# Patient Record
Sex: FEMALE | Race: WHITE | NOT HISPANIC OR LATINO | Employment: OTHER | URBAN - METROPOLITAN AREA
[De-identification: names, ages, dates, MRNs, and addresses within clinical notes are randomized per-mention and may not be internally consistent; named-entity substitution may affect disease eponyms.]

---

## 2021-08-09 ENCOUNTER — TRANSFERRED RECORDS (OUTPATIENT)
Dept: HEALTH INFORMATION MANAGEMENT | Facility: CLINIC | Age: 71
End: 2021-08-09

## 2022-01-06 ENCOUNTER — TRANSCRIBE ORDERS (OUTPATIENT)
Dept: OTHER | Age: 72
End: 2022-01-06
Payer: COMMERCIAL

## 2022-01-06 DIAGNOSIS — D47.3 ESSENTIAL THROMBOCYTHEMIA (H): Primary | ICD-10-CM

## 2022-01-07 ENCOUNTER — PATIENT OUTREACH (OUTPATIENT)
Dept: ONCOLOGY | Facility: CLINIC | Age: 72
End: 2022-01-07
Payer: COMMERCIAL

## 2022-01-07 NOTE — PROGRESS NOTES
New pt scheduling transcribed self-referral:    Referral called in by patient for thrombocythemia. Previously seen at [facility].  patient niurka Dr. Lambert due to be seen in Feb, needs refill for medication.        January 7, 2022 OUTGOING CALL to pt, no answer. LVM introducing my role as nurse navigator with [facility] and that we have recd the self-referral for essential thrombocythemia.  Introduced my role as nurse navigator with [facility] and that we have recd the referral, but no records yet.  Explained to pt that she will receive a call from our scheduling intake team and the records team will work with her to obtain the records.     January 13, 2022 INCOMING CALL: Kelsea called to speak with NN inquiring about hydroxyurea rx, labs.  Future Appointments   Date Time Provider Department Center   2/7/2022 10:00 AM Sanjay Lambert MD [facility]     Message to Dr Lambert to advise re: pre-visit lab orders, and I will call pt back with recs. 1/17 12:30 video visit offered, on hold for now. Pt voiced understanding of above instructions and information and denied further questions  Message to records team re: need for outside records from [facility], pt will sign TESFAYE today.    [id number] [employer] [facility] [place] , [place] - [facility] [address]  Hydroxyurea dose is 1-2 caps by mouth per day as directed   Current dose:  5 days a week 1 500 mg, 2 other days 1000 mg.    Dionna Stanley, RN, BSN, OCN  Hematology/Oncology Nurse Navigator  [facility]  [phone]

## 2022-01-13 DIAGNOSIS — D47.3 ESSENTIAL THROMBOCYTHEMIA (H): Primary | ICD-10-CM

## 2022-01-13 RX ORDER — HYDROXYUREA 500 MG/1
500 CAPSULE ORAL DAILY
Qty: 75 CAPSULE | Refills: 6 | Status: SHIPPED | OUTPATIENT
Start: 2022-01-13 | End: 2023-01-19

## 2022-01-13 NOTE — TELEPHONE ENCOUNTER
RECORDS STATUS - ALL OTHER DIAGNOSIS      Action    Action Taken 1/13/22  Spoke w/ pt - pt advised they were currently filling out TESFAYE. Provided pt w/ address phone and fax. Pt will fill out and email to me shortly.  3:14 PM    1/14/22  TESFAYE received, records requested.  6:58 AM    January 14, 2022 10:06 AM ABT  Records from [facility] received and sent to HIM for upload.       RECORDS RECEIVED FROM: [facility]   DATE RECEIVED: 01/14/22   NOTES STATUS DETAILS   OFFICE NOTE from referring provider Self xfer of Dr. Lambert   OFFICE NOTE from medical oncologist External:[facility] 08/09/21: Dr. Lambert   DISCHARGE SUMMARY from hospital     DISCHARGE REPORT from the ER     OPERATIVE REPORT     MEDICATION LIST External:[facility]    CLINICAL TRIAL TREATMENTS TO DATE     LABS     PATHOLOGY REPORTS     ANYTHING RELATED TO DIAGNOSIS External:[facility] Most recent labs 08/09/21   GENONOMIC TESTING     TYPE:     IMAGING (NEED IMAGES & REPORT)     CT SCANS     MRI     MAMMO     ULTRASOUND     PET

## 2022-01-14 NOTE — PROGRESS NOTES
January 14, 2022 notified pt by phone that Dr Lambert placed orders for CBC w/diff as well as Hydrea rx and concurs plan for  video visit on 1/17 to establish care with him , with lab draw prior to review results at visit. Pt voiced understanding of above instructions and information and denied further questions. She prefers to self-schedule the lab appt via [employer] lab only visit feature at her [facility].

## 2022-01-16 NOTE — PROGRESS NOTES
Kelsea is a 71 year old who is being evaluated via a billable video visit.      How would you like to obtain your AVS? MyChart  If the video visit is dropped, the invitation should be resent by: Text to cell phone: [phone]  Will anyone else be joining your video visit? Karen Laws    Video Start Time: 12:40  Video-Visit Details    Type of service:  Video Visit    Video End Time:1:03 PM    Originating Location (pt. Location): Home    Distant Location (provider location):  [facility]     Platform used for Video Visit: [facility] Medical Oncology Note       Date of visit: January 17, 2022  New Outpatient Clinic Note        Assessment:     1. Essential thrombocythemia, with stable platelet count on current dosing of hydroxyurea, 1000 mg p.o. Saturdays and Sundays, with 500 mg p.o. daily the rest of the week.  This has been stable for 20 years and the chance of this becoming a major issue for her is extraordinarily low.  There is a one in 20 chance of progressing to either myelofibrosis or an acute leukemia.  This is why it continued to need to see Kelsea and its been a pleasure to do so.  2. As has always been my concern, the major healthcare issue is that I think she is got COPD.  She smoked for many years, but has quit now for the last three or four.  Nonetheless I think she would do better by seeing a primary care physician who could have her on some inhaled steroids.  3. Multiple other psychosocial stressors discussed at length        Plan:     1.           Sanjay Lambert MD, MSc  Associate Professor of Medicine  [employer]  [facility]  [address] [place] [id number]  [phone]    __________________________________________________________________    CHIEF COMPLAINT     1. Essential thrombocythemia, diagnosed by bone marrow biopsy on 04/10/2001.  Kelsea has low risk features but has a significantly increased risk  of thrombosis because of her long-term history of smoking.  She has quit for the last 4-5 years.  2. I have known pain for 20 years and she is always refused to see a primary care provider even though it is my assumption that she has COPD based on exam findings.  She may also have hypertension and hyperlipidemia, things I do not manage.  She is always refused seeing somebody else.  What can I do?    History of Present Illness:       Hydroxyurea at varying doses.  She has been on this for 20 years.  Her current dose is 1000 mg p.o. daily on Saturdays and Sundays, with 500 mg daily the rest of the week    She also remains on a daily aspirin.      Interval history:       Kelsea is back for her virtual visit    She has gone through a very stressful time.  Her brother, who lives in [place], was recently found to have widely metastatic lung cancer.  He was found down in his place.  He had metastases throughout most of his body.  He was then brought home and all of his family was with him where he passed away peacefully.  It was very tough on Kelsea because she was quite close with him.  She also had to bring her mother from [place] to [place] with her.  All of the family looks back on this experience as being stressful, but they are all very satisfied with how everything went.    She and Jason have been pretty much isolating themselves.  This is more of an issue for day and then for Kelsea.    She was about to see a primary care physician and then her brother's illness struck.  She still has no primary care provider.  She understands that seeing one would be helpful since she does get short of breath with a lot of activity.  She understands that this is related to her probable diagnosis of COPD.    She has no new lumps or bumps.    Her appetite is good.    She has not been ill.  She is triple vaccinated.    She has no bleeding or bruising.    She is still tolerating the hydroxyurea and the aspirin.    Multiple other  psychosocial stressors were discussed at length today.      Past Medical History:   I have reviewed this patient's past medical history   No past medical history on file.       Past Surgical History:    I have reviewed this patient's past surgical history       Social History:   Tobacco, ETOH, and rec drugs reviewed and as noted below with the following exceptions:  Kelsea is  but has no biologic children.  She is originally from [place].  She has been traveling back and forth to [place] because her 93-year-old mother, who most recently lived with her and her  Jason, wanted to see family.  Kelsea's brothers think of her as a Saint.  She would like them to share some of the burden of their mother's care.            Family History:     No family history on file.         Medications:     Current Outpatient Medications   Medication Sig Dispense Refill     Alendronate Sodium (FOSAMAX PO) Take  by mouth.       aspirin 81 MG chewable tablet Take 81 mg by mouth daily.       azithromycin (ZITHROMAX) 250 MG tablet Two tablets first day, then one tablet daily for four days   6 tablet 0     benzonatate (TESSALON) 200 MG capsule Take 1 capsule by mouth 3 times daily as needed for cough. 21 capsule 0     Calcium Carbonate-Vitamin D (CALCIUM + D PO) Take  by mouth.       Cholecalciferol (VITAMIN D PO) Take  by mouth.       guaiFENesin-codeine (ROBITUSSIN AC) 100-10 MG/5ML SOLN Take 5 mLs by mouth At Bedtime. 120 mL 0     hydrocodone-acetaminophen 5-325 MG per tablet Take 1-2 tablets by mouth every 6 hours as needed for pain. 12 tablet 0     hydroxyurea (HYDREA) 500 MG capsule Take 1 capsule (500 mg) by mouth daily 5 days/week with 1000 mg the other 2 days 75 capsule 6     hydroxyurea (HYDREA) 500 MG capsule Take 1 capsule by mouth daily.       ORDER FOR DME Equipment being ordered: Oxygen 1 each 0     raloxifene (EVISTA) 60 MG tablet Take 60 mg by mouth daily.                Physical Exam:   There were no vitals taken for  this visit.    ECOG PS: 0  Constitutional: WDWN female in NAD, pleasant and appropriate  Neurologic: alert, answering questions appropriately, moving all extremities spontaneously. CN 2-12 grossly intact.  Psych: appropriate affect  No further exam could be done because this was a virtual visit  Data:   No results for input(s): WBC, NEUTROPHIL, HGB, PLT in the last 39672 hours.  No results for input(s): NA, POTASSIUM, CHLORIDE, CO2, ANIONGAP, BUN, CR, KAYLAN in the last 27390 hours.  No results for input(s): MAG, PHOS, LDH, URIC in the last 73300 hours.  No results for input(s): BILITOTAL, ALKPHOS, ALT, AST, ALBUMIN, LDH in the last 85877 hours.    No results found for this or any previous visit (from the past 24 hour(s)).    Other Data     Her CBC was reviewed.  She has a white blood cell count of 3.7 with an ANC of 2.1.  There are no blasts on the differential.  Hemoglobin is 13.3 with an MCV of 117.  Platelet count is 421,000      Labs, imaging and treatment plan reviewed with patient. All questions answered.        40 minutes spent on the date of the encounter doing chart review, review of outside records, review of test results, interpretation of tests, patient visit and documentation

## 2022-01-17 ENCOUNTER — VIRTUAL VISIT (OUTPATIENT)
Dept: ONCOLOGY | Facility: CLINIC | Age: 72
End: 2022-01-17
Attending: INTERNAL MEDICINE
Payer: COMMERCIAL

## 2022-01-17 ENCOUNTER — LAB (OUTPATIENT)
Dept: LAB | Facility: CLINIC | Age: 72
End: 2022-01-17
Payer: COMMERCIAL

## 2022-01-17 DIAGNOSIS — D47.3 ESSENTIAL THROMBOCYTHEMIA (H): ICD-10-CM

## 2022-01-17 LAB
BASOPHILS # BLD AUTO: 0 10E3/UL (ref 0–0.2)
BASOPHILS NFR BLD AUTO: 0 %
EOSINOPHIL # BLD AUTO: 0.1 10E3/UL (ref 0–0.7)
EOSINOPHIL NFR BLD AUTO: 2 %
ERYTHROCYTE [DISTWIDTH] IN BLOOD BY AUTOMATED COUNT: 12.8 % (ref 10–15)
HCT VFR BLD AUTO: 40.5 % (ref 35–47)
HGB BLD-MCNC: 13.3 G/DL (ref 11.7–15.7)
LYMPHOCYTES # BLD AUTO: 1 10E3/UL (ref 0.8–5.3)
LYMPHOCYTES NFR BLD AUTO: 28 %
MCH RBC QN AUTO: 38.6 PG (ref 26.5–33)
MCHC RBC AUTO-ENTMCNC: 32.8 G/DL (ref 31.5–36.5)
MCV RBC AUTO: 117 FL (ref 78–100)
MONOCYTES # BLD AUTO: 0.5 10E3/UL (ref 0–1.3)
MONOCYTES NFR BLD AUTO: 12 %
NEUTROPHILS # BLD AUTO: 2.1 10E3/UL (ref 1.6–8.3)
NEUTROPHILS NFR BLD AUTO: 57 %
PLATELET # BLD AUTO: 421 10E3/UL (ref 150–450)
RBC # BLD AUTO: 3.45 10E6/UL (ref 3.8–5.2)
WBC # BLD AUTO: 3.7 10E3/UL (ref 4–11)

## 2022-01-17 PROCEDURE — 99215 OFFICE O/P EST HI 40 MIN: CPT | Mod: 95 | Performed by: INTERNAL MEDICINE

## 2022-01-17 PROCEDURE — 36415 COLL VENOUS BLD VENIPUNCTURE: CPT

## 2022-01-17 PROCEDURE — 85025 COMPLETE CBC W/AUTO DIFF WBC: CPT

## 2022-01-17 NOTE — LETTER
1/17/2022         RE: Sienna Bennett  [address]  [place] [address]        Dear Colleague,    Thank you for referring your patient, Sienna Bennett, to the [facility]. Please see a copy of my visit note below.    Kelsea is a 71 year old who is being evaluated via a billable video visit.      How would you like to obtain your AVS? MyChart  If the video visit is dropped, the invitation should be resent by: Text to cell phone: [phone]  Will anyone else be joining your video visit? Karen Mckeonxu Laws    Video Start Time: 12:40  Video-Visit Details    Type of service:  Video Visit    Video End Time:1:03 PM    Originating Location (pt. Location): Home    Distant Location (provider location):  [facility]     Platform used for Video Visit: [facility] Medical Oncology Note       Date of visit: January 17, 2022  New Outpatient Clinic Note        Assessment:     1. Essential thrombocythemia, with stable platelet count on current dosing of hydroxyurea, 1000 mg p.o. Saturdays and Sundays, with 500 mg p.o. daily the rest of the week.  This has been stable for 20 years and the chance of this becoming a major issue for her is extraordinarily low.  There is a one in 20 chance of progressing to either myelofibrosis or an acute leukemia.  This is why it continued to need to see Kelsea and its been a pleasure to do so.  2. As has always been my concern, the major healthcare issue is that I think she is got COPD.  She smoked for many years, but has quit now for the last three or four.  Nonetheless I think she would do better by seeing a primary care physician who could have her on some inhaled steroids.  3. Multiple other psychosocial stressors discussed at length        Plan:     1.           Sanjay Lambert MD, MSc  Associate Professor of Medicine  [employer]  [facility]  [address] [place] [address]  [place] [address]  [phone]    __________________________________________________________________    CHIEF COMPLAINT     1. Essential thrombocythemia, diagnosed by bone marrow biopsy on 04/10/2001.  Kelsea has low risk features but has a significantly increased risk of thrombosis because of her long-term history of smoking.  She has quit for the last 4-5 years.  2. I have known pain for 20 years and she is always refused to see a primary care provider even though it is my assumption that she has COPD based on exam findings.  She may also have hypertension and hyperlipidemia, things I do not manage.  She is always refused seeing somebody else.  What can I do?    History of Present Illness:       Hydroxyurea at varying doses.  She has been on this for 20 years.  Her current dose is 1000 mg p.o. daily on Saturdays and Sundays, with 500 mg daily the rest of the week    She also remains on a daily aspirin.      Interval history:       Kelsea is back for her virtual visit    She has gone through a very stressful time.  Her brother, who lives in [place], was recently found to have widely metastatic lung cancer.  He was found down in his place.  He had metastases throughout most of his body.  He was then brought home and all of his family was with him where he passed away peacefully.  It was very tough on Kelsea because she was quite close with him.  She also had to bring her mother from [place] to [place] with her.  All of the family looks back on this experience as being stressful, but they are all very satisfied with how everything went.    She and Jason have been pretty much isolating themselves.  This is more of an issue for day and then for Kelsea.    She was about to see a primary care physician and then her brother's illness struck.  She still has no primary care provider.  She understands that seeing one would be helpful since she does get short of breath with a lot of activity.  She understands that this is  related to her probable diagnosis of COPD.    She has no new lumps or bumps.    Her appetite is good.    She has not been ill.  She is triple vaccinated.    She has no bleeding or bruising.    She is still tolerating the hydroxyurea and the aspirin.    Multiple other psychosocial stressors were discussed at length today.      Past Medical History:   I have reviewed this patient's past medical history   No past medical history on file.       Past Surgical History:    I have reviewed this patient's past surgical history       Social History:   Tobacco, ETOH, and rec drugs reviewed and as noted below with the following exceptions:  Kelsea is  but has no biologic children.  She is originally from [place].  She has been traveling back and forth to [place] because her 93-year-old mother, who most recently lived with her and her  Jason, wanted to see family.  Kelsea's brothers think of her as a Saint.  She would like them to share some of the burden of their mother's care.            Family History:     No family history on file.         Medications:     Current Outpatient Medications   Medication Sig Dispense Refill     Alendronate Sodium (FOSAMAX PO) Take  by mouth.       aspirin 81 MG chewable tablet Take 81 mg by mouth daily.       azithromycin (ZITHROMAX) 250 MG tablet Two tablets first day, then one tablet daily for four days   6 tablet 0     benzonatate (TESSALON) 200 MG capsule Take 1 capsule by mouth 3 times daily as needed for cough. 21 capsule 0     Calcium Carbonate-Vitamin D (CALCIUM + D PO) Take  by mouth.       Cholecalciferol (VITAMIN D PO) Take  by mouth.       guaiFENesin-codeine (ROBITUSSIN AC) 100-10 MG/5ML SOLN Take 5 mLs by mouth At Bedtime. 120 mL 0     hydrocodone-acetaminophen 5-325 MG per tablet Take 1-2 tablets by mouth every 6 hours as needed for pain. 12 tablet 0     hydroxyurea (HYDREA) 500 MG capsule Take 1 capsule (500 mg) by mouth daily 5 days/week with 1000 mg the other 2 days 75  capsule 6     hydroxyurea (HYDREA) 500 MG capsule Take 1 capsule by mouth daily.       ORDER FOR DME Equipment being ordered: Oxygen 1 each 0     raloxifene (EVISTA) 60 MG tablet Take 60 mg by mouth daily.                Physical Exam:   There were no vitals taken for this visit.    ECOG PS: 0  Constitutional: WDWN female in NAD, pleasant and appropriate  Neurologic: alert, answering questions appropriately, moving all extremities spontaneously. CN 2-12 grossly intact.  Psych: appropriate affect  No further exam could be done because this was a virtual visit  Data:   No results for input(s): WBC, NEUTROPHIL, HGB, PLT in the last 45878 hours.  No results for input(s): NA, POTASSIUM, CHLORIDE, CO2, ANIONGAP, BUN, CR, KAYLAN in the last 77103 hours.  No results for input(s): MAG, PHOS, LDH, URIC in the last 45393 hours.  No results for input(s): BILITOTAL, ALKPHOS, ALT, AST, ALBUMIN, LDH in the last 46947 hours.    No results found for this or any previous visit (from the past 24 hour(s)).    Other Data     Her CBC was reviewed.  She has a white blood cell count of 3.7 with an ANC of 2.1.  There are no blasts on the differential.  Hemoglobin is 13.3 with an MCV of 117.  Platelet count is 421,000      Labs, imaging and treatment plan reviewed with patient. All questions answered.        40 minutes spent on the date of the encounter doing chart review, review of outside records, review of test results, interpretation of tests, patient visit and documentation               Again, thank you for allowing me to participate in the care of your patient.        Sincerely,        Sanjay Lambert MD

## 2022-01-22 ENCOUNTER — HEALTH MAINTENANCE LETTER (OUTPATIENT)
Age: 72
End: 2022-01-22

## 2022-02-07 ENCOUNTER — PRE VISIT (OUTPATIENT)
Dept: ONCOLOGY | Facility: CLINIC | Age: 72
End: 2022-02-07
Payer: COMMERCIAL

## 2022-02-15 ENCOUNTER — TELEPHONE (OUTPATIENT)
Dept: FAMILY MEDICINE | Facility: CLINIC | Age: 72
End: 2022-02-15

## 2022-02-15 ENCOUNTER — VIRTUAL VISIT (OUTPATIENT)
Dept: FAMILY MEDICINE | Facility: CLINIC | Age: 72
End: 2022-02-15
Payer: COMMERCIAL

## 2022-02-15 ENCOUNTER — MYC MEDICAL ADVICE (OUTPATIENT)
Dept: FAMILY MEDICINE | Facility: CLINIC | Age: 72
End: 2022-02-15

## 2022-02-15 DIAGNOSIS — R06.09 DYSPNEA ON EXERTION: Primary | ICD-10-CM

## 2022-02-15 DIAGNOSIS — Z13.220 SCREENING CHOLESTEROL LEVEL: ICD-10-CM

## 2022-02-15 DIAGNOSIS — Z87.891 PERSONAL HISTORY OF TOBACCO USE: ICD-10-CM

## 2022-02-15 PROCEDURE — 99204 OFFICE O/P NEW MOD 45 MIN: CPT | Mod: 95 | Performed by: FAMILY MEDICINE

## 2022-02-15 RX ORDER — TIOTROPIUM BROMIDE 18 UG/1
18 CAPSULE ORAL; RESPIRATORY (INHALATION) DAILY
Qty: 30 CAPSULE | Refills: 1 | Status: SHIPPED | OUTPATIENT
Start: 2022-02-15 | End: 2022-02-25

## 2022-02-15 NOTE — PROGRESS NOTES
Kelsea is a 71 year old who is being evaluated via a billable video visit.      How would you like to obtain your AVS? MyChart  If the video visit is dropped, the invitation should be resent by: Text to cell phone: see epic  Will anyone else be joining your video visit? No      Video Start Time: 9:18 AM    Assessment & Plan     Dyspnea on exertion  Certainly copd likely. Will trial spiriva. Also will get lung cancer screening and formal pft's.   Discussed other causes of dyspnea and will have her get labs. Consider ekg and cardiac testing also in future (had coronary artery therosclerosis seen on ct 2013).   Reviewed red flag symptoms that would precipitate the need for routine, urgent or emergent f/u   - tiotropium (SPIRIVA) 18 MCG inhaled capsule; Inhale 1 capsule (18 mcg) into the lungs daily  - Adult Dermatology Referral; Future  - General PFT Lab (Please always keep checked); Future  - Pulmonary Function Test; Future  - CBC with platelets and differential; Future  - Comprehensive metabolic panel (BMP + Alb, Alk Phos, ALT, AST, Total. Bili, TP); Future  - TSH with free T4 reflex; Future    Personal history of tobacco use  She has quit but continues to use nicotine tablets. We reviewed weaning off of these and she is feeling more motivated to do this.   - Prof Fee: Shared Decision Making Visit for Lung Cancer Screening  - CT Chest Lung Cancer Scrn Low Dose wo; Future  - tiotropium (SPIRIVA) 18 MCG inhaled capsule; Inhale 1 capsule (18 mcg) into the lungs daily    Screening cholesterol level  - Lipid panel reflex to direct LDL Fasting; Future    She is long behind on health screening.  Will have her seen in person and discuss further with getting her caught up.          Patient Instructions   Please call  [facility] in [place] at [phone] to schedule lung CT scan and pulmonary function testing.     Please schedule fasting lab visit.     Trial of spiriva inhaler        Lung Cancer Screening   Frequently Asked  Questions  If you are at high-risk for lung cancer, getting screened with low-dose computed tomography (LDCT) every year can help save your life. This handout offers answers to some of the most common questions about lung cancer screening. If you have other questions, please call [phone] ([phone]).     What is it?  Lung cancer screening uses special X-ray technology to create an image of your lung tissue. The exam is quick and easy and takes less than 10 seconds. We don t give you any medicine or use any needles. You can eat before and after the exam. You don t need to change your clothes as long as the clothing on your chest doesn t contain metal. But, you do need to be able to hold your breath for at least 6 seconds during the exam.    What is the goal of lung cancer screening?  The goal of lung cancer screening is to save lives. Many times, lung cancer is not found until a person starts having physical symptoms. Lung cancer screening can help detect lung cancer in the earliest stages when it may be easier to treat.    Who should be screened for lung cancer?  We suggest lung cancer screening for anyone who is at high-risk for lung cancer. You are in the high-risk group if you:      are between the ages of 55 and 79, and    have smoked at least 1 pack of cigarettes a day for 20 or more years, and    still smoke or have quit within the past 15 years.    However, if you have a new cough or shortness of breath, you should talk to your doctor before being screened.    Why does it matter if I have symptoms?  Certain symptoms can be a sign that you have a condition in your lungs that should be checked and treated by your doctor. These symptoms include fever, chest pain, a new or changing cough, shortness of breath that you have never felt before, coughing up blood or unexplained weight loss. Having any of these symptoms can greatly affect the results of lung cancer screening.       Should all smokers get  an LDCT lung cancer screening exam?  It depends. Lung cancer screening is for a very specific group of men and women who have a history of heavy smoking over a long period of time (see  Who should be screened for lung cancer  above).  I am in the high-risk group, but have been diagnosed with cancer in the past. Is LDCT lung cancer screening right for me?  In some cases, you should not have LDCT lung screening, such as when your doctor is already following your cancer with CT scan studies. Your doctor will help you decide if LDCT lung screening is right for you.  Do I need to have a screening exam every year?  Yes. If you are in the high-risk group described earlier, you should get an LDCT lung cancer screening exam every year until you are 79, or are no longer willing or able to undergo screening and possible procedures to diagnose and treat lung cancer.  How effective is LDCT at preventing death from lung cancer?  Studies have shown that LDCT lung cancer screening can lower the risk of death from lung cancer by 20 percent in people who are at high-risk.  What are the risks?  There are some risks and limitations of LDCT lung cancer screening. We want to make sure you understand the risks and benefits, so please let us know if you have any questions. Your doctor may want to talk with you more about these risks.    Radiation exposure: As with any exam that uses radiation, there is a very small increased risk of cancer. The amount of radiation in LDCT is small--about the same amount a person would get from a mammogram. Your doctor orders the exam when he or she feels the potential benefits outweigh the risks.    False negatives: No test is perfect, including LDCT. It is possible that you may have a medical condition, including lung cancer, that is not found during your exam. This is called a false negative result.    False positives and more testing: LDCT very often finds something in the lung that could be cancer, but  in fact is not. This is called a false positive result. False positive tests often cause anxiety. To make sure these findings are not cancer, you may need to have more tests. These tests will be done only if you give us permission. Sometimes patients need a treatment that can have side effects, such as a biopsy. For more information on false positives, see  What can I expect from the results?     Findings not related to lung cancer: Your LDCT exam also takes pictures of areas of your body next to your lungs. In a very small number of cases, the CT scan will show an abnormal finding in one of these areas, such as your kidneys, adrenal glands, liver or thyroid. This finding may not be serious, but you may need more tests. Your doctor can help you decide what other tests you may need, if any.  What can I expect from the results?  About 1 out of 4 LDCT exams will find something that may need more tests. Most of the time, these findings are lung nodules. Lung nodules are very small collections of tissue in the lung. These nodules are very common, and the vast majority--more than 97 percent--are not cancer (benign). Most are normal lymph nodes or small areas of scarring from past infections.  But, if a small lung nodule is found to be cancer, the cancer can be cured more than 90 percent of the time. To know if the nodule is cancer, we may need to get more images before your next yearly screening exam. If the nodule has suspicious features (for example, it is large, has an odd shape or grows over time), we will refer you to a specialist for further testing.  Will my doctor also get the results?  Yes. Your doctor will get a copy of your results.      No follow-ups on file.    Kalli Valle MD  [facility] [place]    Arnulfo Enamorado is a 71 year old who presents for the following health issues. She is a new patient to me and to primary care in our system.   History of Present Illness       COPD:   "She presents for follow up of COPD.  Overall, COPD symptoms are slightly worse since last visit. She has more than usual fatigue or shortness of breath with exertion and same as usual shortness of breath at rest.  She sometimes coughs and does not have change in sputum. No recent fever. She can walk less than 1 block without stopping to rest. She can walk 1 flights of stairs without resting.The patient has had no ED, urgent care, or hospital admissions because of COPD since the last visit.     She eats 2-3 servings of fruits and vegetables daily.She consumes 1 sweetened beverage(s) daily.She exercises with enough effort to increase her heart rate 9 or less minutes per day.  She exercises with enough effort to increase her heart rate 3 or less days per week.   She is taking medications regularly.       Follows with Dr. Petra lovelace/onc and has worked with him for almost 20 years.   \"not a doctor person\"  \"Felt good for forever\". However, in the last year noting have a lot less air. Can't get full breath. Recover quite quickly  Used to play tennis a lot and stopped with covid and think this has impacted her breathing.   Was recommendded by Dr. Lambert to see primary care to get this evaluated.   Notice dyspnea with one level of stairs if carrying something  occ brings up phlegm. Not constant cough.   No hemoptysis  No recent weight loss   No night sweats.   No chest pain.  When get warm will almost feel weak (if around steam, cooking a lot of things on stove, will feel shakey and sick to her stomach).     A lot of skin changes. Noting dark red spot under eye that has been there a while. Interested in derm referral for this.     CT chest done 2013- reviewed through care everywhere. Mod ephysema changes seen.     Pmhx:   Goiter. Parathyroid surgery and possibly thyroidectomy as teen  Long-term smoker but quit in 2014          Objective           Vitals:  No vitals were obtained today due to virtual visit.    Physical Exam "   GENERAL: Healthy, alert and no distress  EYES: Eyes grossly normal to inspection.  No discharge or erythema, or obvious scleral/conjunctival abnormalities.  RESP: No audible wheeze, or visible cyanosis. Occasional cough to clear her throat. No visible retractions or increased work of breathing.    SKIN: Visible skin clear. No significant rash, abnormal pigmentation or lesions.  NEURO: Cranial nerves grossly intact.  Mentation and speech appropriate for age.  PSYCH: Mentation appears normal, affect normal/bright, judgement and insight intact, normal speech and appearance well-groomed.            Video-Visit Details    Type of service:  Video Visit    Video End Time:9:50 AM    Originating Location (pt. Location): Home    Distant Location (provider location):  [facility]     Platform used for Video Visit: [facility]  Lung Cancer Screening Shared Decision Making Visit     Sienna Bennett is eligible for lung cancer screening on the basis of the information provided in my signed lung cancer screening order.     I have discussed with patient the risks and benefits of screening for lung cancer with low-dose CT.     The risks include:  radiation exposure: one low dose chest CT has as much ionizing radiation as about 15 chest x-rays or 6 months of background radiation living in [place]    false positives: 96% of positive findings/nodules are NOT cancer, but some might still require additional diagnostic evaluation, including biopsy  over-diagnosis: some slow growing cancers that might never have been clinically significant will be detected and treated unnecessarily     The benefit of early detection of lung cancer is contingent upon adherence to annual screening or more frequent follow up if indicated.     Furthermore, reaping the benefits of screening requires Sienna Bennett to be willing and physically able to undergo diagnostic procedures, if indicated. Although no specific guide is  available for determining severity of comorbidities, it is reasonable to withhold screening in patients who have greater mortality risk from other diseases.     We did discuss that the only way to prevent lung cancer is to not smoke. Smoking cessation counseling was not given.      I did not offer risk estimation using a calculator such as this one:    ShouldIScreen

## 2022-02-15 NOTE — PATIENT INSTRUCTIONS
Please call  [facility] in [place] at [phone] to schedule lung CT scan and pulmonary function testing.     Please schedule fasting lab visit.     Trial of spiriva inhaler        Lung Cancer Screening   Frequently Asked Questions  If you are at high-risk for lung cancer, getting screened with low-dose computed tomography (LDCT) every year can help save your life. This handout offers answers to some of the most common questions about lung cancer screening. If you have other questions, please call [phone] ([phone]).     What is it?  Lung cancer screening uses special X-ray technology to create an image of your lung tissue. The exam is quick and easy and takes less than 10 seconds. We don t give you any medicine or use any needles. You can eat before and after the exam. You don t need to change your clothes as long as the clothing on your chest doesn t contain metal. But, you do need to be able to hold your breath for at least 6 seconds during the exam.    What is the goal of lung cancer screening?  The goal of lung cancer screening is to save lives. Many times, lung cancer is not found until a person starts having physical symptoms. Lung cancer screening can help detect lung cancer in the earliest stages when it may be easier to treat.    Who should be screened for lung cancer?  We suggest lung cancer screening for anyone who is at high-risk for lung cancer. You are in the high-risk group if you:      are between the ages of 55 and 79, and    have smoked at least 1 pack of cigarettes a day for 20 or more years, and    still smoke or have quit within the past 15 years.    However, if you have a new cough or shortness of breath, you should talk to your doctor before being screened.    Why does it matter if I have symptoms?  Certain symptoms can be a sign that you have a condition in your lungs that should be checked and treated by your doctor. These symptoms include fever, chest pain, a new or  changing cough, shortness of breath that you have never felt before, coughing up blood or unexplained weight loss. Having any of these symptoms can greatly affect the results of lung cancer screening.       Should all smokers get an LDCT lung cancer screening exam?  It depends. Lung cancer screening is for a very specific group of men and women who have a history of heavy smoking over a long period of time (see  Who should be screened for lung cancer  above).  I am in the high-risk group, but have been diagnosed with cancer in the past. Is LDCT lung cancer screening right for me?  In some cases, you should not have LDCT lung screening, such as when your doctor is already following your cancer with CT scan studies. Your doctor will help you decide if LDCT lung screening is right for you.  Do I need to have a screening exam every year?  Yes. If you are in the high-risk group described earlier, you should get an LDCT lung cancer screening exam every year until you are 79, or are no longer willing or able to undergo screening and possible procedures to diagnose and treat lung cancer.  How effective is LDCT at preventing death from lung cancer?  Studies have shown that LDCT lung cancer screening can lower the risk of death from lung cancer by 20 percent in people who are at high-risk.  What are the risks?  There are some risks and limitations of LDCT lung cancer screening. We want to make sure you understand the risks and benefits, so please let us know if you have any questions. Your doctor may want to talk with you more about these risks.    Radiation exposure: As with any exam that uses radiation, there is a very small increased risk of cancer. The amount of radiation in LDCT is small--about the same amount a person would get from a mammogram. Your doctor orders the exam when he or she feels the potential benefits outweigh the risks.    False negatives: No test is perfect, including LDCT. It is possible that you may  have a medical condition, including lung cancer, that is not found during your exam. This is called a false negative result.    False positives and more testing: LDCT very often finds something in the lung that could be cancer, but in fact is not. This is called a false positive result. False positive tests often cause anxiety. To make sure these findings are not cancer, you may need to have more tests. These tests will be done only if you give us permission. Sometimes patients need a treatment that can have side effects, such as a biopsy. For more information on false positives, see  What can I expect from the results?     Findings not related to lung cancer: Your LDCT exam also takes pictures of areas of your body next to your lungs. In a very small number of cases, the CT scan will show an abnormal finding in one of these areas, such as your kidneys, adrenal glands, liver or thyroid. This finding may not be serious, but you may need more tests. Your doctor can help you decide what other tests you may need, if any.  What can I expect from the results?  About 1 out of 4 LDCT exams will find something that may need more tests. Most of the time, these findings are lung nodules. Lung nodules are very small collections of tissue in the lung. These nodules are very common, and the vast majority--more than 97 percent--are not cancer (benign). Most are normal lymph nodes or small areas of scarring from past infections.  But, if a small lung nodule is found to be cancer, the cancer can be cured more than 90 percent of the time. To know if the nodule is cancer, we may need to get more images before your next yearly screening exam. If the nodule has suspicious features (for example, it is large, has an odd shape or grows over time), we will refer you to a specialist for further testing.  Will my doctor also get the results?  Yes. Your doctor will get a copy of your results.

## 2022-02-15 NOTE — TELEPHONE ENCOUNTER
Called patient to schedule lab appointment per Leonard, patient answered and we were able to make her an appointment. Thank you.

## 2022-02-15 NOTE — Clinical Note
Please assist patient with scheduling in person visit with me in 1 month for recheck lungs (40 min preferred. Ok to use a same day slot to ensure she gets timely visit if needed).

## 2022-02-22 NOTE — TELEPHONE ENCOUNTER
Prior Authorization Approval    Authorization Effective Date: 2/22/2022  Authorization Expiration Date: 12/31/2022  Medication: spiriva-APPROVED  Approved Dose/Quantity:   Reference #:     Insurance Company: [employer] Part D - Phone [phone] Fax [phone]  Expected CoPay:       CoPay Card Available:      Foundation Assistance Needed:    Which Pharmacy is filling the prescription (Not needed for infusion/clinic administered): [facility] [id number] [address] [facility] - [place], [place] - [address]  Pharmacy Notified: Yes  Patient Notified: No    **Instructed pharmacy to notify patient when script is ready to /ship.**

## 2022-02-22 NOTE — TELEPHONE ENCOUNTER
Central Prior Authorization Team   Phone: [phone]      PA Initiation    Medication: spiriva-Initiated  Insurance Company: [employer] Part D - Phone [phone] Fax [phone]  Pharmacy Filling the Rx: [id number] [place] - [address]  Filling Pharmacy Phone: [phone]  Filling Pharmacy Fax:    Start Date: 2/22/2022

## 2022-02-25 ENCOUNTER — LAB (OUTPATIENT)
Dept: LAB | Facility: CLINIC | Age: 72
End: 2022-02-25
Payer: COMMERCIAL

## 2022-02-25 DIAGNOSIS — R73.01 IMPAIRED FASTING GLUCOSE: ICD-10-CM

## 2022-02-25 DIAGNOSIS — Z13.220 SCREENING CHOLESTEROL LEVEL: ICD-10-CM

## 2022-02-25 DIAGNOSIS — R06.09 DYSPNEA ON EXERTION: ICD-10-CM

## 2022-02-25 DIAGNOSIS — E78.5 HYPERLIPIDEMIA LDL GOAL <100: ICD-10-CM

## 2022-02-25 DIAGNOSIS — R79.89 ABNORMAL TSH: Primary | ICD-10-CM

## 2022-02-25 PROBLEM — D69.3 ESSENTIAL THROMBOCYTOPENIA (H): Status: ACTIVE | Noted: 2022-02-25

## 2022-02-25 LAB
ALBUMIN SERPL-MCNC: 4 G/DL (ref 3.4–5)
ALP SERPL-CCNC: 134 U/L (ref 40–150)
ALT SERPL W P-5'-P-CCNC: 47 U/L (ref 0–50)
ANION GAP SERPL CALCULATED.3IONS-SCNC: 8 MMOL/L (ref 3–14)
AST SERPL W P-5'-P-CCNC: 27 U/L (ref 0–45)
BASOPHILS # BLD AUTO: 0 10E3/UL (ref 0–0.2)
BASOPHILS NFR BLD AUTO: 0 %
BILIRUB SERPL-MCNC: 0.4 MG/DL (ref 0.2–1.3)
BUN SERPL-MCNC: 18 MG/DL (ref 7–30)
CALCIUM SERPL-MCNC: 9.4 MG/DL (ref 8.5–10.1)
CHLORIDE BLD-SCNC: 102 MMOL/L (ref 94–109)
CHOLEST SERPL-MCNC: 294 MG/DL
CO2 SERPL-SCNC: 27 MMOL/L (ref 20–32)
CREAT SERPL-MCNC: 0.86 MG/DL (ref 0.52–1.04)
EOSINOPHIL # BLD AUTO: 0.1 10E3/UL (ref 0–0.7)
EOSINOPHIL NFR BLD AUTO: 1 %
ERYTHROCYTE [DISTWIDTH] IN BLOOD BY AUTOMATED COUNT: 12.2 % (ref 10–15)
FASTING STATUS PATIENT QL REPORTED: YES
GFR SERPL CREATININE-BSD FRML MDRD: 73 ML/MIN/1.73M2
GLUCOSE BLD-MCNC: 105 MG/DL (ref 70–99)
HCT VFR BLD AUTO: 42.1 % (ref 35–47)
HDLC SERPL-MCNC: 79 MG/DL
HGB BLD-MCNC: 13.9 G/DL (ref 11.7–15.7)
LDLC SERPL CALC-MCNC: 188 MG/DL
LYMPHOCYTES # BLD AUTO: 1 10E3/UL (ref 0.8–5.3)
LYMPHOCYTES NFR BLD AUTO: 20 %
MCH RBC QN AUTO: 38.3 PG (ref 26.5–33)
MCHC RBC AUTO-ENTMCNC: 33 G/DL (ref 31.5–36.5)
MCV RBC AUTO: 116 FL (ref 78–100)
MONOCYTES # BLD AUTO: 0.4 10E3/UL (ref 0–1.3)
MONOCYTES NFR BLD AUTO: 7 %
NEUTROPHILS # BLD AUTO: 3.6 10E3/UL (ref 1.6–8.3)
NEUTROPHILS NFR BLD AUTO: 72 %
NONHDLC SERPL-MCNC: 215 MG/DL
PLATELET # BLD AUTO: 485 10E3/UL (ref 150–450)
POTASSIUM BLD-SCNC: 4 MMOL/L (ref 3.4–5.3)
PROT SERPL-MCNC: 7.8 G/DL (ref 6.8–8.8)
RBC # BLD AUTO: 3.63 10E6/UL (ref 3.8–5.2)
SODIUM SERPL-SCNC: 137 MMOL/L (ref 133–144)
T4 FREE SERPL-MCNC: 1.2 NG/DL (ref 0.76–1.46)
TRIGL SERPL-MCNC: 137 MG/DL
TSH SERPL DL<=0.005 MIU/L-ACNC: 5.22 MU/L (ref 0.4–4)
WBC # BLD AUTO: 5 10E3/UL (ref 4–11)

## 2022-02-25 PROCEDURE — 84439 ASSAY OF FREE THYROXINE: CPT

## 2022-02-25 PROCEDURE — 36415 COLL VENOUS BLD VENIPUNCTURE: CPT

## 2022-02-25 PROCEDURE — 80050 GENERAL HEALTH PANEL: CPT

## 2022-02-25 PROCEDURE — 80061 LIPID PANEL: CPT

## 2022-02-25 NOTE — RESULT ENCOUNTER NOTE
"Kelsea,  It was a pleasure talking with you recently.   - Your thyroid test was borderline abnormal. This could indicate you are developing hypothyroidism or an \"underactive thyroid gland\". Symptoms of hypothyroidism could include fatigue, weight gain, depression, constipation, muscle and joint pain. Given the lab is only borderline abnormal, I would recommended a few things. Please schedule a lab only visit in 2-3 weeks to recheck this thyroid test and a few other thyroid labs that can help us in determining how significant the borderline TSH level is. If the labs remain abnormal, I would recommend reviewing this together at your visit.   - the kidney, liver and electrolyte panel was normal except for the glucose level. The fasting blood glucose is elevated in the pre-diabetic range (100-125). This puts you at risk for developing diabetes in the future.  Lifestyle measures will help to lower your blood glucose levels and lower the risks of diabetes. These measures include regular exercise, weight loss/maintaining a healthy body weight, and moderating/decreasing carbohydrates (sugar, bread, pasta, rice, baked goods, potatoes, etc) in your diet. We will continue to monitor your blood glucose annually, but please be seen sooner  if you develop any new signs or symptoms of diabetes (increase thirst or urination, fatigue, blurry vision, unexplained weight loss).    -The blood count panel looks to be similar to past checks.   - The cholesterol panel shows the LDL (bad cholesterol) was quite high. You would be a candidate for cholesterol lowering medication - we can talk about this at your next visit.  I would also encourage you to work on lifestyle measures to bring your cholesterol down and reduce cardiovascular risks.   Recommendations to reduce cholesterol and cardiovascular risks:  Diet:  -Try to eat more vegetables, fruits, legumes, nuts/seeds, whole grains, and fish.  - try to eat less red meat, processed meats, " processed foods, sweetened beverages.   - try to replace saturated fat in your diet with mono- and poly-unsaturated fats   Exercise:  Aim for regular exercise with a goal of 150 min of moderate to high intensity aerobic exercise per week as you are able to tolerate.    Please MyChart or call if you have any concerns or questions.   Sincerely,  Kalli Valle MD

## 2022-03-04 ENCOUNTER — TELEPHONE (OUTPATIENT)
Dept: FAMILY MEDICINE | Facility: CLINIC | Age: 72
End: 2022-03-04
Payer: COMMERCIAL

## 2022-03-10 ENCOUNTER — LAB (OUTPATIENT)
Dept: LAB | Facility: CLINIC | Age: 72
End: 2022-03-10
Payer: COMMERCIAL

## 2022-03-10 DIAGNOSIS — R79.89 ABNORMAL TSH: ICD-10-CM

## 2022-03-10 LAB
T4 FREE SERPL-MCNC: 1.33 NG/DL (ref 0.76–1.46)
TSH SERPL DL<=0.005 MIU/L-ACNC: 4.26 MU/L (ref 0.4–4)

## 2022-03-10 PROCEDURE — 84439 ASSAY OF FREE THYROXINE: CPT

## 2022-03-10 PROCEDURE — 84443 ASSAY THYROID STIM HORMONE: CPT

## 2022-03-10 PROCEDURE — 86376 MICROSOMAL ANTIBODY EACH: CPT

## 2022-03-10 PROCEDURE — 36415 COLL VENOUS BLD VENIPUNCTURE: CPT

## 2022-03-10 PROCEDURE — 86800 THYROGLOBULIN ANTIBODY: CPT

## 2022-03-10 NOTE — LETTER
March 18, 2022      Kelsea Bennett  [address]  [place] [address]              Kelsea,  Thanks for coming in for the additional blood work.  The TSH thyroid test continues to be slightly abnormal but is closer to normal than last check.  The thyroid antibody tests were both negative.  These results indicate it is unlikely you are developing significant hypothyroidism (underactive thyroid disease) at this time.  We will want to continue to monitor the thyroid test going forward every 6 to 12 months.  However, check in with me sooner if you are developing new symptoms of hypothyroidism (fatigue, unexpected weight gain, constipation, extremely dry skin, depression, hair loss).   Please MyChart or call if you have any concerns or questions.   Sincerely,  Kalli Valle MD    Resulted Orders   Thyroid peroxidase antibody   Result Value Ref Range    Thyroid Peroxidase Antibody <10 <35 IU/mL   T4 free   Result Value Ref Range    Free T4 1.33 0.76 - 1.46 ng/dL

## 2022-03-11 LAB
THYROGLOB AB SERPL IA-ACNC: <20 IU/ML
THYROPEROXIDASE AB SERPL-ACNC: <10 IU/ML

## 2022-03-11 NOTE — RESULT ENCOUNTER NOTE
Kelsea,  Thanks for coming in for the additional blood work.  The TSH thyroid test continues to be slightly abnormal but is closer to normal than last check.  The thyroid antibody tests were both negative.  These results indicate it is unlikely you are developing significant hypothyroidism (underactive thyroid disease) at this time.  We will want to continue to monitor the thyroid test going forward every 6 to 12 months.  However, check in with me sooner if you are developing new symptoms of hypothyroidism (fatigue, unexpected weight gain, constipation, extremely dry skin, depression, hair loss).   Please MyChart or call if you have any concerns or questions.   Sincerely,  Kalli Valle MD

## 2022-03-17 ENCOUNTER — ANCILLARY PROCEDURE (OUTPATIENT)
Dept: CT IMAGING | Facility: CLINIC | Age: 72
End: 2022-03-17
Attending: FAMILY MEDICINE
Payer: COMMERCIAL

## 2022-03-17 DIAGNOSIS — Z87.891 PERSONAL HISTORY OF TOBACCO USE: ICD-10-CM

## 2022-03-17 PROCEDURE — 71271 CT THORAX LUNG CANCER SCR C-: CPT | Mod: GC | Performed by: RADIOLOGY

## 2022-03-18 ENCOUNTER — TELEPHONE (OUTPATIENT)
Dept: FAMILY MEDICINE | Facility: CLINIC | Age: 72
End: 2022-03-18
Payer: COMMERCIAL

## 2022-03-18 DIAGNOSIS — J44.9 CHRONIC OBSTRUCTIVE PULMONARY DISEASE, UNSPECIFIED COPD TYPE (H): ICD-10-CM

## 2022-03-18 DIAGNOSIS — R93.89 ABNORMAL CT SCAN, CHEST: ICD-10-CM

## 2022-03-18 DIAGNOSIS — Z12.31 ENCOUNTER FOR SCREENING MAMMOGRAM FOR BREAST CANCER: ICD-10-CM

## 2022-03-18 DIAGNOSIS — R06.09 DYSPNEA ON EXERTION: Primary | ICD-10-CM

## 2022-03-18 NOTE — TELEPHONE ENCOUNTER
"Called patient with CT results.   - reviewed copd + bronchiolitis results. rec pulm consult  - reviewed cardiac calcification/aortic valve calcification. rec echo and cardiac consult  - reviewed breast skin changes. Diagnostic mammo recommended  - reviewed incidental compression fracture. Patient has occ back spasms but no hx of known fall/fx/injury      Patient notes her dyspnea is much improved with inhaler. \"night and day\" better. Requests refill  PFT's scheduled April 5th.   Leaving for a 3 week cruise in April  Patient notes hx of left breast bx and hx of scar tissue seen on mammograms    Phone # given to schedule echo/consults/mammo  F/u with me in office also rec.   Questions answered.       "

## 2022-03-28 ENCOUNTER — ANCILLARY PROCEDURE (OUTPATIENT)
Dept: MAMMOGRAPHY | Facility: CLINIC | Age: 72
End: 2022-03-28
Attending: FAMILY MEDICINE
Payer: COMMERCIAL

## 2022-03-28 DIAGNOSIS — Z12.31 ENCOUNTER FOR SCREENING MAMMOGRAM FOR BREAST CANCER: ICD-10-CM

## 2022-03-28 DIAGNOSIS — R93.89 ABNORMAL CT SCAN, CHEST: ICD-10-CM

## 2022-03-28 PROCEDURE — 77066 DX MAMMO INCL CAD BI: CPT

## 2022-03-28 PROCEDURE — G0279 TOMOSYNTHESIS, MAMMO: HCPCS

## 2022-04-05 ENCOUNTER — OFFICE VISIT (OUTPATIENT)
Dept: NURSING | Facility: CLINIC | Age: 72
End: 2022-04-05
Payer: COMMERCIAL

## 2022-04-05 VITALS — WEIGHT: 113.7 LBS | HEART RATE: 99 BPM | OXYGEN SATURATION: 98 %

## 2022-04-05 DIAGNOSIS — R06.09 DYSPNEA ON EXERTION: ICD-10-CM

## 2022-04-05 PROCEDURE — 94060 EVALUATION OF WHEEZING: CPT | Performed by: INTERNAL MEDICINE

## 2022-04-05 PROCEDURE — 94726 PLETHYSMOGRAPHY LUNG VOLUMES: CPT | Performed by: INTERNAL MEDICINE

## 2022-04-05 PROCEDURE — 94729 DIFFUSING CAPACITY: CPT | Performed by: INTERNAL MEDICINE

## 2022-04-07 LAB
DLCOUNC-%PRED-PRE: 62 %
DLCOUNC-PRE: 11.55 ML/MIN/MMHG
DLCOUNC-PRED: 18.39 ML/MIN/MMHG
ERV-%PRED-PRE: 67 %
ERV-PRE: 0.6 L
ERV-PRED: 0.88 L
EXPTIME-PRE: 9.66 SEC
FEF2575-%PRED-POST: 21 %
FEF2575-%PRED-PRE: 17 %
FEF2575-POST: 0.39 L/SEC
FEF2575-PRE: 0.32 L/SEC
FEF2575-PRED: 1.84 L/SEC
FEFMAX-%PRED-PRE: 55 %
FEFMAX-PRE: 3.02 L/SEC
FEFMAX-PRED: 5.45 L/SEC
FEV1-%PRED-PRE: 42 %
FEV1-PRE: 0.89 L
FEV1FEV6-PRE: 43 %
FEV1FEV6-PRED: 79 %
FEV1FVC-PRE: 37 %
FEV1FVC-PRED: 78 %
FEV1SVC-PRE: 34 %
FEV1SVC-PRED: 74 %
FIFMAX-PRE: 3.07 L/SEC
FRCPLETH-%PRED-PRE: 160 %
FRCPLETH-PRE: 4.18 L
FRCPLETH-PRED: 2.61 L
FVC-%PRED-PRE: 89 %
FVC-PRE: 2.41 L
FVC-PRED: 2.71 L
IC-%PRED-PRE: 102 %
IC-PRE: 2.03 L
IC-PRED: 1.98 L
RVPLETH-%PRED-PRE: 182 %
RVPLETH-PRE: 3.59 L
RVPLETH-PRED: 1.97 L
TLCPLETH-%PRED-PRE: 133 %
TLCPLETH-PRE: 6.21 L
TLCPLETH-PRED: 4.65 L
VA-%PRED-PRE: 96 %
VA-PRE: 4.25 L
VC-%PRED-PRE: 91 %
VC-PRE: 2.63 L
VC-PRED: 2.86 L

## 2022-04-12 ENCOUNTER — VIRTUAL VISIT (OUTPATIENT)
Dept: FAMILY MEDICINE | Facility: CLINIC | Age: 72
End: 2022-04-12
Payer: COMMERCIAL

## 2022-04-12 DIAGNOSIS — J30.2 SEASONAL ALLERGIC RHINITIS, UNSPECIFIED TRIGGER: ICD-10-CM

## 2022-04-12 DIAGNOSIS — J44.9 CHRONIC OBSTRUCTIVE PULMONARY DISEASE, UNSPECIFIED COPD TYPE (H): Primary | ICD-10-CM

## 2022-04-12 DIAGNOSIS — Z12.11 SCREEN FOR COLON CANCER: ICD-10-CM

## 2022-04-12 DIAGNOSIS — M25.50 ARTHRALGIA, UNSPECIFIED JOINT: ICD-10-CM

## 2022-04-12 PROCEDURE — 99215 OFFICE O/P EST HI 40 MIN: CPT | Mod: 95 | Performed by: FAMILY MEDICINE

## 2022-04-12 RX ORDER — DOXYCYCLINE HYCLATE 100 MG
100 TABLET ORAL 2 TIMES DAILY
Qty: 10 TABLET | Refills: 0 | Status: SHIPPED | OUTPATIENT
Start: 2022-04-12 | End: 2022-04-17

## 2022-04-12 RX ORDER — PREDNISONE 20 MG/1
40 TABLET ORAL DAILY
Qty: 10 TABLET | Refills: 0 | Status: SHIPPED | OUTPATIENT
Start: 2022-04-12 | End: 2022-04-17

## 2022-04-12 RX ORDER — LEVALBUTEROL TARTRATE 45 UG/1
2 AEROSOL, METERED ORAL EVERY 4 HOURS PRN
Qty: 15 G | Refills: 1 | Status: SHIPPED | OUTPATIENT
Start: 2022-04-12 | End: 2023-03-24

## 2022-04-12 RX ORDER — FLUTICASONE PROPIONATE 50 MCG
2 SPRAY, SUSPENSION (ML) NASAL DAILY
Qty: 16 G | Refills: 3 | Status: SHIPPED | OUTPATIENT
Start: 2022-04-12 | End: 2022-05-11

## 2022-04-12 NOTE — PROGRESS NOTES
Kelsea is a 71 year old who is being evaluated via a billable video visit.      How would you like to obtain your AVS? MyChart  If the video visit is dropped, the invitation should be resent by: Text to cell phone: [phone]  Will anyone else be joining your video visit? No      Video Start Time: 10:34 AM    Assessment & Plan     Chronic obstructive pulmonary disease, unspecified COPD type (H)  Severe airflow obstruction.  New diagnosis made now based on her PFTs, chest CT and history..  She has had great improvement with the start of the LAMA inhaler.  She may also benefit from ICS/LABA but has an upcoming appointment with pulmonary so will defer this decision to them.  She is currently having a very mild flare of her COPD with increased cough and chest congestion but denies any significant dyspnea.  This certainly could be viral mediated but could also be somewhat triggered by her allergies right now.  We did discuss treatment of COPD flare if her current congestion worsens.  I have given her a prescription for both antibiotic and prednisone to carry with her for her upcoming cruise.  We will also trial of Xopenex as needed to help with symptom management  - doxycycline hyclate (VIBRA-TABS) 100 MG tablet; Take 1 tablet (100 mg) by mouth in the morning and 1 tablet (100 mg) in the evening. Do all this for 5 days.  - predniSONE (DELTASONE) 20 MG tablet; Take 2 tablets (40 mg) by mouth in the morning for 5 days.  - levalbuterol (XOPENEX HFA) 45 MCG/ACT inhaler; Inhale 2 puffs into the lungs every 4 hours as needed for shortness of breath / dyspnea or wheezing    Seasonal allergic rhinitis, unspecified trigger  Flare of symptoms currently with nasal congestion and cough.  We will have her restart her daily antihistamine, Claritin and also add on Flonase nasal spray.  - fluticasone (FLONASE) 50 MCG/ACT nasal spray; Spray 2 sprays into both nostrils in the morning.    Screen for colon cancer  Reviewed options for  screening.  Patient has a home stool test sent to her by her insurance company at home right now and would like to do that at this time.  She will consider a colonoscopy in the future but feels it would be too much to do right now.    Arthralgia  Slight flare of arthralgia in a few joints of her hand and feet.  Certainly this could be a viral mediated issue or even postvaccination driven.  We discussed management of pain with OTC topicals and bracing.  Consider referral to Ortho in the future if ongoing symptoms.  We also did explore if this could be a side effect of her LAMA inhaler as arthralgias is listed as a common adverse effect.  Timing does not really fit for this and I would not expect something like a trigger finger to occur as a side effect so I think this is less likely although I cannot rule it out altogether     Patient Instructions   For hand joint pain:  Trial Voltaren (diclofenac) gel    For allergy:  Start anti-histamine such as claritin (loratadine) 10 mg daily  Flonase nasal spray    For lungs:  Start prednisone and doxycycline antibiotic if worsening of breathing, cough, congestion/sputum.     Get pneumonia vaccine and tetanus vaccine prior to travel.          Return in about 2 months (around 6/20/2022) for Routine preventive.     42 minutes spent on the date of the encounter doing chart review, review of test results, patient visit and documentation     Kalli Valle MD  [facility] is a 71 year old who presents for the following health issues     History of Present Illness       Reason for visit:  Trigger thumb  Symptom onset:  1-2 weeks ago  Symptoms include:  Pain, stiffness  Symptom intensity:  Moderate  Symptom progression:  Improving  Had these symptoms before:  No  What makes it worse:  No  What makes it better:  Splint    She eats 4 or more servings of fruits and vegetables daily.She consumes 0 sweetened beverage(s) daily.She  "exercises with enough effort to increase her heart rate 20 to 29 minutes per day.  She exercises with enough effort to increase her heart rate 4 days per week.   She is taking medications regularly.     About 2.5 weeks ago developed trigger thumb. Since braced it is a lot better.   Joints feel \"different\".   Feeling of low grade arthritis. Base of thumb.    occ toe will also feel similar feeling   Pain will come and go  No joint swelling. No fam hx of AI disease.     Last few weeks few migraine auras. Has had these over the the years so not new but few more recently.     Has seen a huge change in breathing since starting the incruse ellipta. No longer having dyspnea on exertion. Can walk on treadmill now without getting out of breath and very happy with these results.  Has completed a CT chest screening for lung cancer with several incidental findings.  She was called about these previously and we briefly reviewed them again today      Rarely leave the house  Doesn't thinks she's had a viral infection recently.   Getting ready to go on a cruise. Got 2nd covid booster.   More chest congestion the last few days. Coughing up phlegm, yellow at times.   Does get allergies this time of year.     Hated/did not tolerate albuterol in past \"feeling of snakes inside of her\", shaking     Has stool test from insurance for colon cancer screening.           Objective           Vitals:  No vitals were obtained today due to virtual visit.    Physical Exam   GENERAL: Healthy, alert and no distress  EYES: Eyes grossly normal to inspection.  No discharge or erythema, or obvious scleral/conjunctival abnormalities.  RESP: No audible wheeze,  or visible cyanosis. occ cough. Congested voice.  No visible retractions or increased work of breathing.    SKIN: Visible skin clear. No significant rash, abnormal pigmentation or lesions.  NEURO: Cranial nerves grossly intact.  Mentation and speech appropriate for age.  PSYCH: Mentation appears " normal, affect normal/bright, judgement and insight intact, normal speech and appearance well-groomed.    CT Low Dose Lung Cancer Screening     History:  Lung cancer screening, >= 30 pk-yr current smoker (Age  55-80y); Personal history of tobacco use Screening for lung cancer,  smoking.     Number of packs-year of smokin  Current or former smoker?: Former  If former, number of years since quit?: 10 EUR15     Comparison: None     Technique: Helical acquisition low dose CT chest. Images reviewed in  lung, soft tissue and bone windows.  DLP: 36 (mGy*cm)  CTDIvol: 2 (mGy)     Findings:  Nodules: Scattered micronodules are present for example a 2 mm right  lower lobe solid nodule on series 4 image 211 and a 2 mm groundglass  nodule in the left upper lobe on series 4 image 91. Left lung apex has  a few scattered groundglass micronodules present as well.     Emphysema: Moderate centrilobular emphysema     Coronary artery calcium: moderate     Additional findings: Normal cardiac size. Conventional branching  pattern of thoracic vessels. No pericardial effusions. Moderate  coronary artery calcifications. Mild to moderate aortic arch  calcifications. Normal caliber of the thoracic aorta and main  pulmonary trunk. No suspicious lymphadenopathy in the chest.     The central airway is patent. No pneumothorax, pleural effusions, or  focal infectious consolidations. Moderate apical predominant  centrilobular emphysematous changes. Scattered areas of distal mucous  plugging in the right lower lobe and right middle lobe. Associated  areas of subpleural parenchymal bands likely representing scarring.     Included upper abdomen demonstrates moderate scattered vascular  calcifications, no other worrisome findings.     Mild anterior wedge compression deformity of T11 with superimposed  Schmorl's node deformity on series 5 image 53. Small calcifications at  the skin surface of the outer left breast.                                                                       Impression:   1. ACR Assessment Category (v1.1):  Lung-RADS Category 2. Benign  appearance or behavior.       Recommendation:  Lung-RADS Category 2. Benign appearance or behavior.  Recommendation:  continue annual screening with Lung cancer screening  CT (please order exam code [id number]).      2. Significant Incidental Finding(s):  Category S: Yes.  a.  coronary artery calcium moderate or severe . Aortic valve  calcification moderate. Cardiology consult may be helpful  b.  Moderate apical predominant centrilobular emphysematous changes.    c.  Age-indeterminate mild anterior wedge compression deformity of T11  with superimposed Schmorl's node deformity.       3. Scattered distal mucous plugging and scarring suggestion  bronchiolitis.     4. Additional incidental findings including calcifications at the left  breast skin surface for which mammographic correlation is recommended.     5. Avoidance of tobacco smoke is strongly advised. Please consider  referral for smoking cessation to [facility] Medication Therapy Management  (MTM) if clinically appropriate.        PFT's 4/5/22  Performed by: [facility] PFT  The FEV1 and FEV1/FVC ratio are reduced.   The inspiratory flow rates are within normal limits.  The TLC, FRC and RV are increased indicating overinflation.  Following administration of bronchodilators, there is no significant response.  The diffusing   capacity is reduced.  However, the diffusing capacity was not corrected for the patient's hemoglobin.  Maldistribution of ventilation is indicated by the difference between the alveolar volume and the total lung capacity.     IMPRESSION:     Severe Airflow Obstruction     No significant change following bronchodilators but this does not rule out clinical efficacy.     Mild diffusion defect.     The diffusing capacity was not corrected for the patient's hemoglobin.     The increased lung volumes indicate hyperinflation.     No previous studies  available for comparison.     Farzana Nickerson MD                  Video-Visit Details    Type of service:  Video Visit    Video End Time:11:16 AM    Originating Location (pt. Location): Home    Distant Location (provider location):  [facility]     Platform used for Video Visit: [employer]

## 2022-04-12 NOTE — PATIENT INSTRUCTIONS
For hand joint pain:  Trial Voltaren (diclofenac) gel    For allergy:  Start anti-histamine such as claritin (loratadine) 10 mg daily  Flonase nasal spray    For lungs:  Start prednisone and doxycycline antibiotic if worsening of breathing, cough, congestion/sputum.     Get pneumonia vaccine and tetanus vaccine prior to travel.

## 2022-04-19 ENCOUNTER — OFFICE VISIT (OUTPATIENT)
Dept: FAMILY MEDICINE | Facility: CLINIC | Age: 72
End: 2022-04-19
Payer: COMMERCIAL

## 2022-04-19 VITALS
RESPIRATION RATE: 24 BRPM | HEART RATE: 87 BPM | OXYGEN SATURATION: 98 % | SYSTOLIC BLOOD PRESSURE: 132 MMHG | TEMPERATURE: 97.9 F | WEIGHT: 111 LBS | DIASTOLIC BLOOD PRESSURE: 72 MMHG

## 2022-04-19 DIAGNOSIS — R03.0 ELEVATED BLOOD PRESSURE READING WITHOUT DIAGNOSIS OF HYPERTENSION: Primary | ICD-10-CM

## 2022-04-19 LAB
ALBUMIN SERPL-MCNC: 4.1 G/DL (ref 3.4–5)
ALP SERPL-CCNC: 123 U/L (ref 40–150)
ALT SERPL W P-5'-P-CCNC: 36 U/L (ref 0–50)
ANION GAP SERPL CALCULATED.3IONS-SCNC: 7 MMOL/L (ref 3–14)
AST SERPL W P-5'-P-CCNC: 21 U/L (ref 0–45)
BASOPHILS # BLD AUTO: 0 10E3/UL (ref 0–0.2)
BASOPHILS NFR BLD AUTO: 0 %
BILIRUB SERPL-MCNC: 0.5 MG/DL (ref 0.2–1.3)
BUN SERPL-MCNC: 22 MG/DL (ref 7–30)
CALCIUM SERPL-MCNC: 9.7 MG/DL (ref 8.5–10.1)
CHLORIDE BLD-SCNC: 104 MMOL/L (ref 94–109)
CO2 SERPL-SCNC: 28 MMOL/L (ref 20–32)
CREAT SERPL-MCNC: 0.87 MG/DL (ref 0.52–1.04)
EOSINOPHIL # BLD AUTO: 0.1 10E3/UL (ref 0–0.7)
EOSINOPHIL NFR BLD AUTO: 2 %
ERYTHROCYTE [DISTWIDTH] IN BLOOD BY AUTOMATED COUNT: 12 % (ref 10–15)
GFR SERPL CREATININE-BSD FRML MDRD: 72 ML/MIN/1.73M2
GLUCOSE BLD-MCNC: 100 MG/DL (ref 70–99)
HCT VFR BLD AUTO: 41.2 % (ref 35–47)
HGB BLD-MCNC: 13.7 G/DL (ref 11.7–15.7)
LYMPHOCYTES # BLD AUTO: 0.9 10E3/UL (ref 0.8–5.3)
LYMPHOCYTES NFR BLD AUTO: 15 %
MCH RBC QN AUTO: 38.4 PG (ref 26.5–33)
MCHC RBC AUTO-ENTMCNC: 33.3 G/DL (ref 31.5–36.5)
MCV RBC AUTO: 115 FL (ref 78–100)
MONOCYTES # BLD AUTO: 0.5 10E3/UL (ref 0–1.3)
MONOCYTES NFR BLD AUTO: 8 %
NEUTROPHILS # BLD AUTO: 4.1 10E3/UL (ref 1.6–8.3)
NEUTROPHILS NFR BLD AUTO: 74 %
PLATELET # BLD AUTO: 479 10E3/UL (ref 150–450)
POTASSIUM BLD-SCNC: 4.8 MMOL/L (ref 3.4–5.3)
PROT SERPL-MCNC: 7.6 G/DL (ref 6.8–8.8)
RBC # BLD AUTO: 3.57 10E6/UL (ref 3.8–5.2)
SODIUM SERPL-SCNC: 139 MMOL/L (ref 133–144)
TROPONIN I SERPL HS-MCNC: 7 NG/L
WBC # BLD AUTO: 5.5 10E3/UL (ref 4–11)

## 2022-04-19 PROCEDURE — 84484 ASSAY OF TROPONIN QUANT: CPT | Performed by: PHYSICIAN ASSISTANT

## 2022-04-19 PROCEDURE — 99214 OFFICE O/P EST MOD 30 MIN: CPT | Performed by: PHYSICIAN ASSISTANT

## 2022-04-19 PROCEDURE — 80053 COMPREHEN METABOLIC PANEL: CPT | Performed by: PHYSICIAN ASSISTANT

## 2022-04-19 PROCEDURE — 85025 COMPLETE CBC W/AUTO DIFF WBC: CPT | Performed by: PHYSICIAN ASSISTANT

## 2022-04-19 PROCEDURE — 93000 ELECTROCARDIOGRAM COMPLETE: CPT | Performed by: PHYSICIAN ASSISTANT

## 2022-04-19 PROCEDURE — 36415 COLL VENOUS BLD VENIPUNCTURE: CPT | Performed by: PHYSICIAN ASSISTANT

## 2022-04-19 ASSESSMENT — PAIN SCALES - GENERAL: PAINLEVEL: NO PAIN (0)

## 2022-04-19 NOTE — RESULT ENCOUNTER NOTE
Dear Kelsea  Your electrolytes, blood sugar, kidney function and liver function were normal.     Your heart enzyme test was normal.   Your blood counts are comparable to previous and not alarming.  Please call or MyChart my office with any questions or concerns.   Charlee Roche, PAC

## 2022-04-19 NOTE — PROGRESS NOTES
Assessment & Plan     Elevated blood pressure reading without diagnosis of hypertension  Blood pressure normal here today and symptoms noted yesterday have resolved.  Not anemic.  Normal troponin. No evidence of ischemia on ekg - cautiously reassured- regular echo in 2 days and then leaves for 3 week cruise  - EKG 12-lead complete w/read - Clinics  - CBC with platelets and differential  - Comprehensive metabolic panel (BMP + Alb, Alk Phos, ALT, AST, Total. Bili, TP)  - Troponin I  - CBC with platelets and differential  - Comprehensive metabolic panel (BMP + Alb, Alk Phos, ALT, AST, Total. Bili, TP)  - Troponin I      Review of the result(s) of each unique test - cbc, cmp, troponin, ekg   Ordering of each unique test  27 minutes spent on the date of the encounter doing chart review, history and exam, documentation and further activities per the note       Patient Instructions   Work on discontinuing nicotine   Limit sodium intake to no more than 2 grams daily  Keep upcoming appointment as scheduled with Dr. Valle   Return urgently if any change in symptoms like chest pain, shortness of breath, or other change in symptoms           Return in about 3 months (around 7/20/2022), or if symptoms worsen or fail to improve, for Routine preventive, in person.    Charlee Roche PA-C  [facility]   Kelsea is a 71 year old who presents for the following health issues     HPI     -PT states that she did not feel good at all yesterday, felt like tight on her arms, feels like she has to kind of throw up    Hypertension Follow-up      Do you check your blood pressure regularly outside of the clinic? Yes     Are you following a low salt diet? Yes    Are your blood pressures ever more than 140 on the top number (systolic) OR more   than 90 on the bottom number (diastolic), for example 140/90? Yes; 187/94, 174/92, 146/79, 151/76      How many servings of fruits and vegetables do you  "eat daily?  3-5    On average, how many sweetened beverages do you drink each day (Examples: soda, juice, sweet tea, etc.  Do NOT count diet or artificially sweetened beverages)?   0    How many days per week do you exercise enough to make your heart beat faster? 4    How many minutes a day do you exercise enough to make your heart beat faster? 20 - 29    How many days per week do you miss taking your medication? 0    Symptoms have resolved.  I feel whoe lot different today than yesterday  Wonders if symptoms building for awhile   Vision had changed yesterday.  Not had eye exam- prescription changed  Yesterday vision different than today  Right eye was blurry -not enough  Not blurry today  No chest pain.  No shortness of breath  Yesterday feel like blood pressure cuff on both forearms but that is not present today  Has a regular echo scheduled in 2 days after abnormal Chest CT   Echo will be performed 2 days before leave for a 3 week cruise[place] [employer]  I am sleeping ok  Mom living wth me age 94 year old and going on 3 year old   Just lost my brother - he was in hospice  Whole lot of stress  Energy level - good - not depressed \" I am basiically a lazy person\"   Not smoked since 2013  Does use nicotine  lozenges- habit   Yesterday took nicotin tablet  walkign up stairs will have some shortness of breath but relates that to copd   When [place] in [place] put me on a statin and took off 3 months later because liver enzymes high  Drinks 3-4 cups coffee daiily  Doesn't drink alcohol hardly ever   Review of Systems   Constitutional, HEENT, cardiovascular, pulmonary, gi and gu systems are negative, except as otherwise noted.      Objective    /72   Pulse 87   Temp 97.9  F (36.6  C) (Tympanic)   Resp 24   Wt 50.3 kg (111 lb)   SpO2 98%   There is no height or weight on file to calculate BMI.  Physical Exam   GENERAL: healthy, alert and no distress  NECK: no adenopathy, no asymmetry, masses, or " scars and thyroid normal to palpation  RESP: lungs clear to auscultation - no rales, rhonchi or wheezes  CV: regular rate and rhythm, normal S1 S2, no S3 or S4, no murmur, click or rub, no peripheral edema and peripheral pulses strong  ABDOMEN: soft, nontender, no hepatosplenomegaly, no masses and bowel sounds normal  MS: no gross musculoskeletal defects noted, no edema  PSYCH: mentation appears normal, tearful, anxious, judgement and insight intact and appearance well groomed  Normal speech, cn2-12 intact, normal gait,   Results for orders placed or performed in visit on 04/19/22 (from the past 24 hour(s))   CBC with platelets and differential    Narrative    The following orders were created for panel order CBC with platelets and differential.  Procedure                               Abnormality         Status                     ---------                               -----------         ------                     CBC with platelets and d...[[id number]]  Abnormal            Final result                 Please view results for these tests on the individual orders.   Comprehensive metabolic panel (BMP + Alb, Alk Phos, ALT, AST, Total. Bili, TP)   Result Value Ref Range    Sodium 139 133 - 144 mmol/L    Potassium 4.8 3.4 - 5.3 mmol/L    Chloride 104 94 - 109 mmol/L    Carbon Dioxide (CO2) 28 20 - 32 mmol/L    Anion Gap 7 3 - 14 mmol/L    Urea Nitrogen 22 7 - 30 mg/dL    Creatinine 0.87 0.52 - 1.04 mg/dL    Calcium 9.7 8.5 - 10.1 mg/dL    Glucose 100 (H) 70 - 99 mg/dL    Alkaline Phosphatase 123 40 - 150 U/L    AST 21 0 - 45 U/L    ALT 36 0 - 50 U/L    Protein Total 7.6 6.8 - 8.8 g/dL    Albumin 4.1 3.4 - 5.0 g/dL    Bilirubin Total 0.5 0.2 - 1.3 mg/dL    GFR Estimate 72 >60 mL/min/1.73m2   Troponin I   Result Value Ref Range    Troponin I High Sensitivity 7 <54 ng/L   CBC with platelets and differential   Result Value Ref Range    WBC Count 5.5 4.0 - 11.0 10e3/uL    RBC Count 3.57 (L) 3.80 - 5.20 10e6/uL     Hemoglobin 13.7 11.7 - 15.7 g/dL    Hematocrit 41.2 35.0 - 47.0 %     (H) 78 - 100 fL    MCH 38.4 (H) 26.5 - 33.0 pg    MCHC 33.3 31.5 - 36.5 g/dL    RDW 12.0 10.0 - 15.0 %    Platelet Count 479 (H) 150 - 450 10e3/uL    % Neutrophils 74 %    % Lymphocytes 15 %    % Monocytes 8 %    % Eosinophils 2 %    % Basophils 0 %    Absolute Neutrophils 4.1 1.6 - 8.3 10e3/uL    Absolute Lymphocytes 0.9 0.8 - 5.3 10e3/uL    Absolute Monocytes 0.5 0.0 - 1.3 10e3/uL    Absolute Eosinophils 0.1 0.0 - 0.7 10e3/uL    Absolute Basophils 0.0 0.0 - 0.2 10e3/uL     ekg with sinus rhythm, short MD - reviewed with Dr. Stephenson - nothing concerning-no ischemic changes

## 2022-04-19 NOTE — PATIENT INSTRUCTIONS
Work on discontinuing nicotine   Limit sodium intake to no more than 2 grams daily  Keep upcoming appointment as scheduled with Dr. Valle   Return urgently if any change in symptoms like chest pain, shortness of breath, or other change in symptoms

## 2022-04-21 ENCOUNTER — ANCILLARY PROCEDURE (OUTPATIENT)
Dept: CARDIOLOGY | Facility: CLINIC | Age: 72
End: 2022-04-21
Attending: FAMILY MEDICINE
Payer: COMMERCIAL

## 2022-04-21 DIAGNOSIS — R06.09 DYSPNEA ON EXERTION: ICD-10-CM

## 2022-04-21 DIAGNOSIS — R93.89 ABNORMAL CT SCAN, CHEST: ICD-10-CM

## 2022-04-21 LAB — LVEF ECHO: NORMAL

## 2022-04-21 PROCEDURE — 93306 TTE W/DOPPLER COMPLETE: CPT | Performed by: INTERNAL MEDICINE

## 2022-04-21 NOTE — LETTER
"2022      Kelsea Bennett  [address]  [place] [facility] [address]              Kelsea,  Overall the function of your heart looks good. It is pumping and filling effectively.   Your aortic valve does have some calcium build up on it. This seems to be making the valve more stiff and affecting the way it opens and closes. This is called aortic stenosis. Thankfully, it was only noted to be \"mild\". Many times there is no physical symptoms from this (especially when mild), but in some people it can cause shortness of breath with exertion, decreased exercise tolerance and dizziness or even passing out. As you know, other things (COPD, etc) can also cause similar symptoms so its unclear how much this is impacting you right now. As already planned, please follow-up with cardiology to review this result and decide on next steps.   Please MyChart or call if you have any concerns or questions.   Sincerely,  Kalli Valle MD    Resulted Orders   Echocardiogram Complete   Result Value Ref Range    LVEF  55-60%     Narrative    [id number]  [id number]  [id number]  [id number]^MARILIN^KALLI^ANGELICA     [facility]  Echocardiography Laboratory  [address] [place] [address]     Name: MERRITT BENNETT  MRN: [id number]  : 1950  Study Date: 2022 03:32 PM  Age: 71 yrs  Gender: Female  Patient Location: [facility]  Reason For Study: Dyspnea on exertion, Abnormal CT scan, chest  Ordering Physician: KALLI VALLE  Referring Physician: KALLI VALLE  Performed By: Maureen Mason RDCS     BSA: 1.5 m2  Height: 63 in  Weight: 111 lb  BP: 176/82 mmHg  ______________________________________________________________________________  Procedure  Echocardiogram with two-dimensional, color and spectral Doppler performed.  ______________________________________________________________________________  Interpretation Summary     Left ventricular size, wall motion and " function are normal. The ejection  fraction is 55-60%.     Right ventricular function, chamber size, wall motion, and thickness are  normal.     There is mild aortic stensosis with the calculated valve area of 1.5cm^2  using  an LVOT diameter of 1.8cm. The mean gradient is 15mmhg and the peak velocity  is 2.6cm/sec.     The inferior vena cava is normal.     Trivial pericardial effusion is present.     There is no prior study for direct comparison.  ______________________________________________________________________________  Left Ventricle  Left ventricular size, wall motion and function are normal. The ejection  fraction is 55-60%. Left ventricular diastolic function is normal.     Right Ventricle  Right ventricular function, chamber size, wall motion, and thickness are  normal.     Atria  Both atria appear normal. The atrial septum is intact as assessed by color  Doppler .     Mitral Valve  The mitral valve is normal.     Aortic Valve  Moderate aortic valve calcification is present. The calculated aortic valve  are is 1.5 cm^2. The mean AoV pressure gradient is 15.0 mmHg. There is mild  aortic stensosis with the calculated valve area of 1.5cm^2 using an LVOT  diameter of 1.8cm. The mean gradient is 15mmhg and the peak velocity is  2.6cm/sec. The peak aortic velocity is 2.6 m/sec. Mild aortic stenosis is  present.     Tricuspid Valve  The tricuspid valve is normal. Right ventricular systolic pressure is 22mmHg  above the right atrial pressure. Pulmonary artery systolic pressure is normal.  Trace tricuspid insufficiency is present.     Pulmonic Valve  The pulmonic valve is normal.     Vessels  The inferior vena cava is normal. Sinuses of Valsalva 2.8 cm. Ascending aorta  2.6 cm. IVC diameter <2.1 cm collapsing >50% with sniff suggests a normal RA  pressure of 3 mmHg.     Pericardium  Prominent epicardial fat is noted. Trivial pericardial effusion is present.     Compared to Previous Study  There is no prior study  for direct comparison.  ______________________________________________________________________________  MMode/2D Measurements & Calculations     IVSd: 0.76 cm  LVIDd: 4.4 cm  LVIDs: 2.6 cm  LVPWd: 0.79 cm  FS: 40.6 %  LV mass(C)d: 104.8 grams  LV mass(C)dI: 69.6 grams/m2  Ao root diam: 2.8 cm  asc Aorta Diam: 2.6 cm  LVOT diam: 1.8 cm  LVOT area: 2.5 cm2  LA Volume (BP): 24.0 ml  LA Volume Index (BP): 15.9 ml/m2  RWT: 0.36     Doppler Measurements & Calculations  MV E max aram: 76.0 cm/sec  MV A max aram: 107.0 cm/sec  MV E/A: 0.71  MV dec time: 0.17 sec  Ao V2 max: 267.0 cm/sec  Ao max P.0 mmHg  Ao V2 mean: 181.0 cm/sec  Ao mean PG: 15.0 mmHg  Ao V2 VTI: 48.0 cm  MOSHE(I,D): 1.5 cm2  MOSHE(V,D): 1.2 cm2  LV V1 max P.0 mmHg  LV V1 max: 122.0 cm/sec  LV V1 VTI: 27.9 cm  SV(LVOT): 71.0 ml  SI(LVOT): 47.2 ml/m2  TR max aram: 230.0 cm/sec  TR max P.6 mmHg  AV Aram Ratio (DI): 0.46  MOSHE Index (cm2/m2): 0.98  E/E' avg: 10.2  Lateral E/e': 10.0  Medial E/e': 10.4     ______________________________________________________________________________  Report approved by: Ryland Sherwood 2022 04:31 PM

## 2022-04-22 NOTE — RESULT ENCOUNTER NOTE
"Kelsea,  Overall the function of your heart looks good. It is pumping and filling effectively.   Your aortic valve does have some calcium build up on it. This seems to be making the valve more stiff and affecting the way it opens and closes. This is called aortic stenosis. Thankfully, it was only noted to be \"mild\". Many times there is no physical symptoms from this (especially when mild), but in some people it can cause shortness of breath with exertion, decreased exercise tolerance and dizziness or even passing out. As you know, other things (COPD, etc) can also cause similar symptoms so its unclear how much this is impacting you right now. As already planned, please follow-up with cardiology to review this result and decide on next steps.   Please MyChart or call if you have any concerns or questions.   Sincerely,  Kalli Valle MD    "

## 2022-05-26 ENCOUNTER — TELEPHONE (OUTPATIENT)
Dept: CARDIOLOGY | Facility: CLINIC | Age: 72
End: 2022-05-26
Payer: COMMERCIAL

## 2022-05-26 NOTE — TELEPHONE ENCOUNTER
"  Oban Study Pre-Visit Call      Study description:   Multiconditions PPG Study. The purpose of this research study is to collect data related to health for the development of mobile technologies. This data will include physiological signal recordings from medical devices and data collection software on Apple Watches (\"study watches\"). This study is not to provide any treatment nor assess safety and effectiveness, but rather to collect information for research and  purposes.     Sienna Bennett a 71 year old female, was called today to discuss participation in the Oban study. The following was reviewed with the patient.       You agree to comply with COVID precautionary measures required by local public health ordinances, workplace health and safety protocols, and/or the Study Team. Such measure may include, for example, wearing a mask, complying with social distancing guidelines, and/ or providing evidence of negative COVID-19 test result Yes       You are fully vaccinated per the most recent CDC guidelines Yes      You do not have any of the following symptoms: fever or chills; difficulty breathing; sustained loss of taste smell, or appetite. Yes      You do not have any of the following unexplained symptoms: fatigue or nausea; whole body muscle aches; new or unexpected headache; sore throat; congestion or runny nose; diarrhea or vomiting Yes      You have not had close contact with someone who is suspected or confirmed to have active COVID-19 in the last 14 days.Yes    Participant stated having COVID-19 May 10,2022. Per CDC guidelines, does not need booster for 3 months.       Reminders    Please come 10 minutes early for your scheduled appointment time.    Bring your vaccination card with you to your scheduled appointment.     No smoking 2 hours prior to your appointment time.    Wear loose shirt.    Eat before you arrive.       Afia Shelton RN       "

## 2022-05-31 ENCOUNTER — OFFICE VISIT (OUTPATIENT)
Dept: CARDIOLOGY | Facility: CLINIC | Age: 72
End: 2022-05-31
Payer: COMMERCIAL

## 2022-05-31 VITALS
HEART RATE: 99 BPM | BODY MASS INDEX: 20.57 KG/M2 | TEMPERATURE: 98.4 F | RESPIRATION RATE: 18 BRPM | WEIGHT: 111.77 LBS | OXYGEN SATURATION: 95 % | SYSTOLIC BLOOD PRESSURE: 139 MMHG | HEIGHT: 62 IN | DIASTOLIC BLOOD PRESSURE: 82 MMHG

## 2022-05-31 DIAGNOSIS — J44.9 CHRONIC OBSTRUCTIVE PULMONARY DISEASE, UNSPECIFIED COPD TYPE (H): Primary | ICD-10-CM

## 2022-05-31 PROCEDURE — 99207 PR NO CHARGE-RESEARCH SERVICE: CPT

## 2022-05-31 NOTE — PROGRESS NOTES
"   Oban Study Consent On-Site Visit      Study description:   Multiconditions PPG Study. The purpose of this research study is to collect data related to health for the development of mobile technologies. This data will include physiological signal recordings from medical devices and data collection software on Apple Watches (\"study watches\"). This study is not to provide any treatment nor assess safety and effectiveness, but rather to collect information for research and  purposes.     Sienna Bennett a 71 year old female, was onsite today to discuss participation in the Oban study.   The consent form was reviewed with the patient.     The review of the study included:    Study Purpose     COVID-19 Criteria     On-Site Study Participation    Participant Responsibilities      Study Data and Devices    Benefits and Risks of Participation    Compensation and Costs of Participation    Voluntary Participation    Confidentiality     Injury and Legal Rights    The subject was queried in regards to her willingness to continue and her questions were answered to her satisfaction.     The patient has given her agreement to volunteer to participate in the above noted study.     The In-Lab consent form and HIPPA form version 28-Apr-2022 was signed 31-MAY-2022 onsite in the Clinic Research Unit.     A copy of the Oban consent will be placed in subject's medical record.  A copy of the consent form was given to the subject today.    Study data is directly entered into Epic per protocol.     No study procedures were done prior to Sienna Bennett providing informed consent.       Afia Shelton RN       "

## 2022-05-31 NOTE — PROGRESS NOTES
Dez Study End Note    Study Description:   Multiconditions PPG Sub-Study. The purpose of this research study is to collect data related to health for the development of mobile technologies. This data will include physiological signal recordings from medical devices and smart watch data collection software. This study is not to provide any treatment. This study will only collect information for research and  purposes.     Adverse Events & Con Med Assessment Performed?   [x] none    Did the Subject Complete the Study? Yes    If no, Termination Reason: N/A    Study Termination/Completion Date: 31-MAY-2022    Afia Shelton RN

## 2022-05-31 NOTE — PROGRESS NOTES
"      Oban Study In-Lab Note      Study description: Multiconditions PPG Study. The purpose of this research study is to collect data related to health for the development of mobile technologies. This data will include physiological signal recordings from medical devices and data collection software on Apple Watches (\"study watches\"). This study is not to provide any treatment nor assess safety and effectiveness, but rather to collect information for research and  purposes.    Subject ID:  [id number]       SCREENING        Sienna Bennett   1950          71 year old  female    Time Subject Sat: 1305    Medical History  COPD 4/12/2022    Current Outpatient Medications:      aspirin 81 MG chewable tablet, Take 81 mg by mouth daily., Disp: , Rfl:      Cholecalciferol (VITAMIN D PO), Take  by mouth., Disp: , Rfl:   -Claritin Daily by mouth     fluticasone (FLONASE) 50 MCG/ACT nasal spray, Spray 2 sprays into both nostrils daily, Disp: 48 g, Rfl: 3     hydroxyurea (HYDREA) 500 MG capsule, Take 1 capsule (500 mg) by mouth daily 5 days/week with 1000 mg the other 2 days, Disp: 75 capsule, Rfl: 6     levalbuterol (XOPENEX HFA) 45 MCG/ACT inhaler, Inhale 2 puffs into the lungs every 4 hours as needed for shortness of breath / dyspnea or wheezing, Disp: 15 g, Rfl: 1     umeclidinium (INCRUSE ELLIPTA) 62.5 MCG/INH inhaler, Inhale 1 puff into the lungs daily, Disp: 90 each, Rfl: 1    Allergies   Allergen Reactions     Wellbutrin [Bupropion]         Past Surgical History:   Procedure Laterality Date     HAND SURGERY       THYROID SURGERY          Child-Bearing Potential?: No    Race: White  Race (Secondary): N/A    : No    Ethnicity: Non-/     Vitals:  /82   Pulse 99   Temp 98.4  F (36.9  C)   Resp 18   Ht 1.575 m (5' 2\")   Wt 50.7 kg (111 lb 12.4 oz)   SpO2 95%   BMI 20.44 kg/m       Sponsor Expected Values   Blood Pressure: SBP: ; DBP: 40-90  Pulse:  " bpm  Temp: 35.5-37.5  C  Respiration: 10-23  Ht: in cm  Wt: in kg  SpO2%: %  BMI: Rounded to nearest whole number    Repeated Measurements: (enter as needed)       Respiratory Conditions: COPD    Spirometer Test Results (FEV%): 42    Condition Severity: Severe - Stage 3 (FEV 30-49%)      Sleep Conditions:  Sleep Apnea Diagnosis: No  Use of CPAP at Night: No    Oxygen Therapy: No      Minutes of Exercise per Week: >60  Type of Activity: Cardio      Measurements & Preferences:  Dominant Hand: Right   Preferred Watch Hand: Left    Volunteer-Reported Johnson Scale: 3  Staff-Recorded Johnson Scale: 3    Hairiness Level: A: Thin Hair, Low Density     Wrist Circumference:  Left: 155 mm       Right: 153 mm          ECG:  ECG not applicable as this subject is in the Respiratory Cohort.         STUDY PROCEDURE DATA     Spectrometer Values:            Left:   L*: 64.40    A*: 6.97   B*: 14.27      Right: L*: 62.42    A*: 8.47   B*: 17.46                 Environmental Conditions:   Temperature: 23.0  C  Barometric Pressure: 29.64 in (from weather.com)   Humidity: 41 %    Lux Level (Near Wrist):  Left: 479.4  Right: 444.4  Pre-Procedure Temperatures:  Left Wrist Skin: 31.8  C  Right Wrist Skin: 25.8  C  Finger Nellcor #1: 28.9  C  Finger Nellcor #2: 23.0  C    Study Date: 05/31/22  Study Time (Macbook Picture 1): 14:20:03     Device IDs  Left Watch ID (Size): [id number] (40)  Right Watch ID (Size): [id number] (40)   Band Size  Left: S/M    Right: S/M  Secure Setting Notch  Left: 4   Right: 4  Watch Enclosure: Aluminum   ([id number] and [id number] are Titanium-delete if not applicable)  Nox ID: [id number]     Sync Box ID: [id number]     Nellcor #1 ID: [id number]  Nellcor #1 Location: Right Index Finger    Nellcor #2 ID: [id number]  Nellcor #2 Location: Right Ring Finger    Subject Transgressions: (time stamp and identify all extraneous subject movements, coughs, etc)       Time Macbook based Picture 1: 14:19:35  Time Watch Left Based Picture  1: 14:19:34   Time Watch Right Based Picture 1: 14:19:34     Time Nellcor #1 Based Picture 2: 14:20:03   Time Nellcor #2 Based Picture 2: 14:20:03   Time Macbook Based Picture 2: 14:20:03     POST-PROCEDURE      Temperatures:  Left Wrist Skin (post): 30.8  C   Right Wrist Skin (post): 29.4  C  Finger Nellcor #1 (post): 29.4  C Finger Nellcor #2 (post): 26.4  C  Subject Performed Secure-1 Measurement? Yes  Moisturizing Cream/Lotion applied at wrist? Yes  Apple Watch Band Tightness During In-Lab Study: Snug but comfortable  Additional Comments (Device/participant issues):     31-MAY-2022  Afia Shelton RN

## 2022-05-31 NOTE — PROGRESS NOTES
"Oban Study Physical Exam      Medical History Reviewed? Yes    Physical Examination  For abnormal findings, please evaluate if the finding is Clinically Significant (by 'CS') or Not Clinically Significant (by 'NCS')  General Appearance   Normal; petite frame  Head and Neck   Abnormal; NCS well healed thyroidectomy scar; bilateral hearing aids  Lungs     Normal  Cardiovascular   Normal  Abdomen    Normal  Musculoskeletal/Extremities  Normal   Lymph Nodes    Normal  Skin     Normal  Neurological    Normal    Tremor (If present document)  Present mild left hand tremor    Vitals:    05/31/22 1329   BP: 139/82   Pulse: 99   Resp: 18   Temp: 98.4  F (36.9  C)   SpO2: 95%   Weight: 50.7 kg (111 lb 12.4 oz)   Height: 1.575 m (5' 2\")              COVID: No symptoms, chills, shortness of breath, or difficulty breathing, muscle or body aches, headache, loss of taste or smell, sore throat, runny nose, congestion, nausea, vomiting or diarrhea according to the [place] Department of Health and Human Services based on the CARES Act.     COVID Vaccinations:   Immunization History   Administered Date(s) Administered     COVID-19,PF,Pfizer (12+ Yrs) 03/05/2021, 03/27/2021, 10/08/2021, 04/01/2022     COVID-19,PF,Pfizer 12+ Yrs (2022 and After) 04/01/2022       Smoking History  Are you currently smoking or vaping? No  How Many Years Have You Smoked or Vaped? 46 years (1/1/1968-1/1/2014)  Packs or E-Cigs Per Day: 2 PPD for the first 40 years; then 0.5 PPD for the last 6 years before quitting    Electrocardiogram   ECG not applicable as subject is in the respiratory cohort.     Respiratory Conditions:   COPD    Spirometer Test Results (FEV%): 42  Condition Severity: Severe - Stage 3 (FEV 30-49%)      Anali Vallejo PA-C       "

## 2022-05-31 NOTE — PROGRESS NOTES
Dez Inclusion/Exclusion Criteria:     Study Name: Dez  : Sanjay Wiggins MD    Protocol version: 3.0 19-JAN-2022     Criteria #  Inclusion Criterita (ALL MUST BE YES)  YES/NO/N/A   1   Male and female subjects at least 18 years old at the time of the screening visit.  Yes   2   Wrist circumference and 120mm-245mm (inclusive).  Yes   3   Ability to understand and provide written informed consent.  Yes   4   Willing and able to comply with study procedures, activities, and duration as described in the ICF. Yes   5  Documentation provided demonstrating COVID-19 up to date vaccinated status as per the current CDC guidelines.  Yes   6   Didn't smoke at least 2 hours before screening (or study procedures).  Yes   7   Neither subject, nor any individuals living with subject, have had new development in the following within the last 14 days prior to study screening:        a. Have failed to comply with any country, state, and local travel restrictions.         b. Have had any unexpected flu-like symptoms (such as fever, chills, cough, shortness of breath, diarrhea, sore throat, runny nose, or trouble breathing).        c. Have had any contact with people confirmed COVID-19.         d. Have been confirmed to have COVID-19 and have not subsequently received a negative COVID-19 test result.    Yes   8   If Cohort 1 (In-Lab Cardiac Conditions):         a. Indication of a rhythm disorder (dated up to 5 years ago) as outlined in Table A (see Protocol page 9), and be present at the time of screening.     NA   9   If Cohort 2 (In-Lab Respiratory Conditions):          a. Prior diagnosis of one of the following conditions, within 5 years: 1) Moderate (GOLD Stage 2) COPD, 2) Severe or Very Severe (GOLD Stage 3 or 4) COPD, 3) Idiopathic pulmonary fibrosis.          b. Record of spirometry FEV% result (within 5 years) are available.    Yes   10   If Cohort 3 (At-Home respiratory Conditions):         a. Prior  diagnosis of one of the following conditions, within 5 years: 1) Moderate (GOLD Stage 2) COPD, 2) Severe or Very Severe (GOLD Stage 3 or 4) COPD, 3) Idiopathic pulmonary fibrosis.          b. Record of spirometry FEV% result (within 5 years) are available.          c. Willing and able to use a study provided iPhone and navigate study Anand flow.          D. Stable WIFI at home and are able to connect it to study iPhones   NA       Criteria # Exclusion Criteria (ALL MUST BE NO) YES/NO/N/A   1   Individuals with severe contact allergies to standard adhesives, or other materials found in pulse oximetry sensors, ECG electrodes, respiration monitor electrodes, wearables device bands and watch surfaces.  No   2   Individuals that do not have at least 2 intact fingers (excluding thumb, *pinky will be excluded only for cohort 1 and cohort 2) on non-preferred hand to wear a watch.  No   3   Open wound(s) or active infections on wrists at study watch wear locations or where the ECG electrodes may be placed.  No   4   Physical disability that prevents safe and adequate testing.  No   5   Individuals with a pacemaker or an automated implantable cardioverter-defibrillator (AICD).  No   6   Individuals with physical scars, tattoos, or other skin markings on wrists where sensors or finger sensor are to be worn.  No   7   Individuals with clinically significant hand tremors, as judged by a Study Investigator.  No   8   Pregnant women.     No   9   Subjects with any medical history, physical exam, vital sign or any other study procedure finding/assessment that in the opinion of the Investigator could compromise subject safety during study participation or interfere with the study integrity and/or the accurate assessment of the study objectives.  No   10   Presence of skin conditions or disease at the fingers of SpO2% application sites that could interfere with SpO2% sensor placement or the accuracy of measurement. Such conditions  include, but are not limited to: extensive scarring, skin lesions, redness, infection or edema at target measurement sites.   No   11   Presence of long fingernails that interfere with the placement of the SpO2% sensor or nail polish at the fingers of SpO2% application sites.  No   12   Medical history or physical assessment finding that makes the subject inappropriate for participation, according to the investigator.  No     Patient does fulfill study inclusion criteria and no exclusion criteria are found.     Sanjay Wiggins MD    31-MAY-2022    Afia Shelton RN

## 2022-06-16 ENCOUNTER — MYC MEDICAL ADVICE (OUTPATIENT)
Dept: CARDIOLOGY | Facility: CLINIC | Age: 72
End: 2022-06-16
Payer: COMMERCIAL

## 2022-06-16 ENCOUNTER — TELEPHONE (OUTPATIENT)
Dept: CARDIOLOGY | Facility: CLINIC | Age: 72
End: 2022-06-16
Payer: COMMERCIAL

## 2022-06-16 NOTE — TELEPHONE ENCOUNTER
Patient states that they have not seen a Cardiologist prior and has not yet started using Levalbuterol as they have not needed to.    DORINDA Lucas   Cardiology Team  [facility]

## 2022-06-16 NOTE — TELEPHONE ENCOUNTER
PREVISIT INFORMATION                                                    Sienna Bennett scheduled for future visit at [facility] specialty clinics.    Patient is scheduled to see Dr. Crespo on 06/23/22  Reason for visit: TALLEY and abnormal CT  Referring provider Dr. Valle  Has patient seen previous specialist? No  Medical Records:  Available in chart.  Patient was previously seen at a [facility] or [facility] facility.    REVIEW                                                      New patient packet mailed to patient: No  Medication reconciliation complete: Yes      Current Outpatient Medications   Medication Sig Dispense Refill     aspirin 81 MG chewable tablet Take 81 mg by mouth daily.       Cholecalciferol (VITAMIN D PO) Take  by mouth.       fluticasone (FLONASE) 50 MCG/ACT nasal spray Spray 2 sprays into both nostrils daily 48 g 3     hydroxyurea (HYDREA) 500 MG capsule Take 1 capsule (500 mg) by mouth daily 5 days/week with 1000 mg the other 2 days 75 capsule 6     levalbuterol (XOPENEX HFA) 45 MCG/ACT inhaler Inhale 2 puffs into the lungs every 4 hours as needed for shortness of breath / dyspnea or wheezing 15 g 1     umeclidinium (INCRUSE ELLIPTA) 62.5 MCG/INH inhaler Inhale 1 puff into the lungs daily 90 each 1       Allergies: Wellbutrin [bupropion]        PLAN/FOLLOW-UP NEEDED                                                      Previsit review complete.  Patient will see provider at future scheduled appointment.     Patient Reminders Given:  Please, make sure you bring an updated list of your medications.   If you are having a procedure, please, present 15 minutes early.  If you need to cancel or reschedule,please call [phone].    Adilia Lucio, CMA

## 2022-06-23 ENCOUNTER — OFFICE VISIT (OUTPATIENT)
Dept: CARDIOLOGY | Facility: CLINIC | Age: 72
End: 2022-06-23
Attending: FAMILY MEDICINE
Payer: COMMERCIAL

## 2022-06-23 VITALS
WEIGHT: 110.8 LBS | OXYGEN SATURATION: 95 % | BODY MASS INDEX: 20.27 KG/M2 | SYSTOLIC BLOOD PRESSURE: 128 MMHG | DIASTOLIC BLOOD PRESSURE: 76 MMHG | HEART RATE: 105 BPM

## 2022-06-23 DIAGNOSIS — R06.09 DYSPNEA ON EXERTION: ICD-10-CM

## 2022-06-23 DIAGNOSIS — R93.89 ABNORMAL CT SCAN, CHEST: ICD-10-CM

## 2022-06-23 DIAGNOSIS — E78.5 HYPERLIPIDEMIA, ACQUIRED: ICD-10-CM

## 2022-06-23 DIAGNOSIS — I25.10 CORONARY ARTERY DISEASE INVOLVING NATIVE CORONARY ARTERY OF NATIVE HEART WITHOUT ANGINA PECTORIS: Primary | ICD-10-CM

## 2022-06-23 PROCEDURE — 99205 OFFICE O/P NEW HI 60 MIN: CPT | Performed by: INTERNAL MEDICINE

## 2022-06-23 RX ORDER — ROSUVASTATIN CALCIUM 20 MG/1
20 TABLET, COATED ORAL DAILY
Qty: 90 TABLET | Refills: 3 | Status: SHIPPED | OUTPATIENT
Start: 2022-06-23 | End: 2023-06-25

## 2022-06-23 RX ORDER — ASPIRIN 81 MG/1
81 TABLET, CHEWABLE ORAL DAILY
Status: CANCELLED | COMMUNITY
Start: 2022-06-23

## 2022-06-23 NOTE — PROGRESS NOTES
Chief complaint: coronary artery calcifications on CT    HPI:   Sinena Bennett is a 71 year old female with history of COPD and essential thrombocytosis (diagnosed by bone marrow biopsy 2001, low-risk features per Hematology) referred for coronary artery calcifications.     Her CAD risk factor profile is: -HTN +HL -DM +former tobacco (1 ppd x 46 years, quit 1/1/2014)     She was seen by her PCP, Dr. Valle, on 3/2022 (note reviewed); CT chest showed coronary artery calcification prompting referral. She was also scheduled for pulmonology referral and PFTs and started on a bronchodilator.     She does report exertional dyspnea. Her exertional threshold for dyspnea is ~1 flight of stairs or carrying laundry and walking quickly. Her main sensation of dyspnea is not being able to get a good breath. She describes a sensation of poor air movement. Her dyspnea has been worse recently due to the heat and humidity. Symptoms are better when she is indoors. She has not used her albuterol inhaler. She denies chest pain/chest pressure    She reports rare palpitations at night, which resolve after taking a deep breath. Symptoms last <30 seconds. This is new over the past 10-12 months. Symptoms occur <1x/week and are unpredictable and are relatively stable over the past year.     She denies any chest pain, dyspnea at rest, PND, orthopnea, peripheral edema, lightheadedness or syncope.       ECG 4/19/22: sinus rhythm with nonspecific T-wave abnormality and borderline low voltage QRS    TTE 4/21/22:   Left and right ventricular size, wall motion and function are normal. The ejection  fraction is 55-60%.  Mild aortic stenosis (PV 2.7 m/s MG 15 mmHg DVI 0.46 MOSHE 1.5 cm2 SVI 47 mL/m2; LVOT VTI is suboptimal which may result in overestimation of aortic stenosis severity based on DVI and MOSHE)    CT 3/17/22 (read by Dr. Moore and reviewed by me):  Moderate 3-vessel coronary calcification  Aortic valve calcification  Ascending  aorta, arch, descending aorta calcifications  Evidence of bronchiolitis, centrilobular emphysema      PAST MEDICAL HISTORY:  Essential thrombocytosis  COPD    CURRENT MEDICATIONS:  Current Outpatient Medications   Medication Sig Dispense Refill     aspirin 81 MG chewable tablet Take 81 mg by mouth daily.       Cholecalciferol (VITAMIN D PO) Take  by mouth.       fluticasone (FLONASE) 50 MCG/ACT nasal spray Spray 2 sprays into both nostrils daily 48 g 3     hydroxyurea (HYDREA) 500 MG capsule Take 1 capsule (500 mg) by mouth daily 5 days/week with 1000 mg the other 2 days 75 capsule 6     levalbuterol (XOPENEX HFA) 45 MCG/ACT inhaler Inhale 2 puffs into the lungs every 4 hours as needed for shortness of breath / dyspnea or wheezing 15 g 1     umeclidinium (INCRUSE ELLIPTA) 62.5 MCG/INH inhaler Inhale 1 puff into the lungs daily 90 each 1       ALLERGIES:     Allergies   Allergen Reactions     Wellbutrin [Bupropion]        FAMILY HISTORY:  Family History   Problem Relation Age of Onset     Hypertension Mother      Hyperlipidemia Mother      Cerebrovascular Disease Father         At age 86     Prostate Cancer Father      Hypertension Brother      Other Cancer Brother         Bladder, Lung     SOCIAL HISTORY:  Social History     Tobacco Use     Smoking status: Former Smoker     Packs/day: 1.00     Years: 46.00     Pack years: 46.00     Types: Cigarettes     Start date: 1968     Quit date: 2014     Years since quittin.4     Smokeless tobacco: Never Used     Tobacco comment: use nicotine tablets- 5 per day   Vaping Use     Vaping Use: Never used   Substance Use Topics     Alcohol use: Yes     Comment: rare- maybe once a month     Drug use: No       ROS:   A comprehensive 14 point review of systems is negative other than as mentioned in HPI.    Exam:  /76 (BP Location: Left arm, Patient Position: Sitting, Cuff Size: Adult Regular)   Pulse 105   Wt 50.3 kg (110 lb 12.8 oz)   SpO2 95%   BMI 20.27 kg/m     GENERAL APPEARANCE: healthy, alert and no distress  EYES: no icterus, no xanthelasmas  ENT: normal palate, mucosa moist, no central cyanosis  NECK: JVP not elevated  RESPIRATORY:soft breath sounds with prolonged expiratory phase, no wheezes  CARDIOVASCULAR: regular rhythm, normal S1 with physiologic split S2, no S3 or S4 and no murmur, click or rub.  GI: soft, non tender, bowel sounds normal,no abdominal bruits  EXTREMITIES: no edema, no bruits  NEURO: alert and oriented to person/place/time, normal speech, gait and affect  VASC: Radial, dorsalis pedis and posterior tibialis pulses 2+ bilaterally.  SKIN: no ecchymoses, no rashes.  PSYCH: cooperative, affect appropriate.     Labs:  Reviewed.   Lipids 2/25/22: chol 294 HDL 79        Testing/Procedures:  I personally visualized and interpreted:    ECG 4/19/22: sinus rhythm with nonspecific T-wave abnormality and borderline low voltage QRS    TTE 4/21/22:   Left and right ventricular size, wall motion and function are normal. The ejection  fraction is 55-60%.  Mild aortic stenosis (PV 2.7 m/s MG 15 mmHg DVI 0.46 MOSHE 1.5 cm2 SVI 47 mL/m2; LVOT VTI is suboptimal which may result in overestimation of aortic stenosis severity based on DVI and MOSHE)    CT 3/17/22 (read by Dr. Moore and reviewed by me):  Moderate 3-vessel coronary calcification  Aortic valve calcification  Ascending aorta, arch, descending aorta calcifications  Evidence of bronchiolitis, centrilobular emphysema      Assessment and Plan:   1. CAD (coronary artery calcifications) on CT without angina, newly-diagnosed  2. HL, uncontrolled  3. COPD  4. Exertional dyspnea, most likely related to pulmonary disease  -continue ASA 81 mg daily  -start rosuvastatin 20 mg daily  -offered coronary CTA; if Pulmonology feels lung disease does not fully account for symptoms or if Kelsea decides she would like to pursue testing, would perform coronary CTA     5. Mild aortic stenosis, newly diagnosed  -repeat TTE 3  years (2025) or for change in symptoms    Follow up in 1 year with fasting lipids prior.    The patient states understanding and is agreeable with plan.   Chart review time Jun 23, 2022: 32 minutes  Visit time Jun 23, 2022: 35 minutes  Total time spent Jun 23, 2022: 67 minutes    Bogdan Crespo MD  Cardiology    CC  NIDIA GASTON

## 2022-06-23 NOTE — PROGRESS NOTES
Sienna Bennett's goals for this visit include: Had an abnormal CT. Wants to know how much of her shortness of breath is the COPD vs heart related issues.     She requests these members of her care team be copied on today's visit information: PCP    PCP: No Ref-Primary, Physician    Referring Provider:  Kalli Valle MD  [address] [address]    BP (!) 146/92 (BP Location: Left arm, Patient Position: Sitting, Cuff Size: Adult Small)   Pulse 105   Wt 50.3 kg (110 lb 12.8 oz)   SpO2 95%   BMI 20.27 kg/m      Do you need any medication refills at today's visit? No.    Gavin Holcomb, EMT  Clinic Support  [facility]  [place]    [phone]    Employed by [facility] [employer]

## 2022-06-23 NOTE — PATIENT INSTRUCTIONS
The following is a summary of your office visit today:        Diagnosis:      Recommendations:      Follow up:        If you have had any blood work, imaging or other testing completed we will be in touch within 1-2 weeks regarding the results. If you have any questions, concerns or need to schedule a follow up, please contact us at [phone] If you are needing refills please contact your pharmacy. For urgent after hour care please call the [facility] at [phone] or the [facility] at [phone] and ask to speak to the cardiologist on call.    It was a pleasure meeting with you today. Please let us know if there is anything else we can do for you so that we can be sure you are leaving completely satisfied with your care experience.     Your Cardiology Team at [facility]  Phone: [phone] Fax: [phone]  RN Care Coordinators: Madisyn  Support Staff: Gavin Pat            HOW TO CHECK YOUR BLOOD PRESSURE AT HOME:     Avoid eating, smoking, and exercising for at least 30 minutes before taking a reading.    Be sure you have taken your BP medication at least 2-3 hours before you check it.     Sit quietly for 10 minutes before a reading.     Sit in a chair with your feet flat on the floor. Rest your  arm on a table so that the arm cuff is at the same level as your heart.    Remain still during the reading.    Record your blood pressure and pulse in a log and bring to your next appointment.

## 2022-07-05 DIAGNOSIS — D47.3 ESSENTIAL THROMBOCYTHEMIA (H): Primary | ICD-10-CM

## 2022-07-11 ENCOUNTER — TELEPHONE (OUTPATIENT)
Dept: CARDIOLOGY | Facility: CLINIC | Age: 72
End: 2022-07-11
Payer: COMMERCIAL

## 2022-07-11 PROCEDURE — 99207 PR NO CHARGE NURSE ONLY: CPT

## 2022-07-11 NOTE — TELEPHONE ENCOUNTER
"  Oban Study Pre-Visit Call      Study description:   Multiconditions PPG Study. The purpose of this research study is to collect data related to health for the development of mobile technologies. This data will include physiological signal recordings from medical devices and data collection software on Apple Watches (\"study watches\"). This study is not to provide any treatment nor assess safety and effectiveness, but rather to collect information for research and  purposes.     Sienna Bennett a 71 year old female, was called today to discuss participation in the Oban study. The following was reviewed with the patient.       You agree to comply with COVID precautionary measures required by local public health ordinances, workplace health and safety protocols, and/or the Study Team. Such measure may include, for example, wearing a mask, complying with social distancing guidelines, and/ or providing evidence of negative COVID-19 test result Yes       You are fully vaccinated per the most recent CDC guidelines Yes      You do not have any of the following symptoms: fever or chills; difficulty breathing; sustained loss of taste smell, or appetite. Yes      You do not have any of the following unexplained symptoms: fatigue or nausea; whole body muscle aches; new or unexpected headache; sore throat; congestion or runny nose; diarrhea or vomiting Yes      You have not had close contact with someone who is suspected or confirmed to have active COVID-19 in the last 14 days.Yes      Reminders    Please come 10 minutes early for your scheduled appointment time.    Bring your vaccination card with you to your scheduled appointment.     No smoking 2 hours prior to your appointment time.    Wear loose shirt.    Eat before you arrive.       Afia Shelton RN       "

## 2022-07-12 ENCOUNTER — ALLIED HEALTH/NURSE VISIT (OUTPATIENT)
Dept: CARDIOLOGY | Facility: CLINIC | Age: 72
End: 2022-07-12

## 2022-07-12 ENCOUNTER — OFFICE VISIT (OUTPATIENT)
Dept: CARDIOLOGY | Facility: CLINIC | Age: 72
End: 2022-07-12
Payer: COMMERCIAL

## 2022-07-12 VITALS
TEMPERATURE: 97.9 F | WEIGHT: 111.77 LBS | DIASTOLIC BLOOD PRESSURE: 67 MMHG | HEIGHT: 62 IN | RESPIRATION RATE: 20 BRPM | BODY MASS INDEX: 20.57 KG/M2 | SYSTOLIC BLOOD PRESSURE: 117 MMHG | OXYGEN SATURATION: 96 % | HEART RATE: 94 BPM

## 2022-07-12 DIAGNOSIS — Z00.6 EXAMINATION OF PARTICIPANT OR CONTROL IN CLINICAL RESEARCH: Primary | ICD-10-CM

## 2022-07-12 DIAGNOSIS — J44.9 CHRONIC OBSTRUCTIVE PULMONARY DISEASE, UNSPECIFIED COPD TYPE (H): Primary | ICD-10-CM

## 2022-07-12 PROCEDURE — 99207 PR NO CHARGE-RESEARCH SERVICE: CPT

## 2022-07-12 PROCEDURE — 99207 PR NO CHARGE NURSE ONLY: CPT

## 2022-07-12 NOTE — PROGRESS NOTES
Oban Study At-Home Consent      Study description:   Multiconditions PPG Sub-Study. The purpose of this research study is to collect data related to health for the development of mobile technologies. This data will include physiological signal recordings from medical devices and smart watch data collection software. This study is not to provide any treatment. This study will only collect information for research and  purposes.     Sienna Bennett a 71 year old female, was onsite today to discuss participation in the At-Home portion of the Oban study.   The consent form was reviewed with the patient.     The review of the study included:    Study Purpose     COVID-19 Criteria     At-Home Study Participation    Participant Responsibilities      Study Data and Devices    Benefits and Risks of Participation    Compensation and Costs of Participation    Voluntary Participation    Confidentiality     Injury and Legal Rights    The subject was queried in regards to her willingness to continue and her questions were answered to her satisfaction.     The patient has given her agreement to volunteer to participate in the above noted study.     The At-Home consent form and HIPPA form version 17 Jun 2022 was signed 12-JUL-2022 onsite in the Clinic Research Unit.     A copy of the At-Home Oban consent will be placed in subject's medical record.  A copy of the consent form was given to the subject today.    Study data is directly entered into Epic per protocol.     No study procedures were done prior to Sienna Bennett providing informed consent.       Sara Behmanesh

## 2022-07-12 NOTE — PROGRESS NOTES
"Oban Study Physical Exam      Medical History Reviewed? Yes    Physical Examination  For abnormal findings, please evaluate if the finding is Clinically Significant (by 'CS') or Not Clinically Significant (by 'NCS')  General Appearance   Normal; petite frame  Head and Neck   Abnormal; NCS well healed thyroidectomy scar; bilateral hearing aids  Lungs     Normal  Cardiovascular   Normal  Abdomen    Normal  Musculoskeletal/Extremities  Normal   Lymph Nodes    Normal  Skin     Normal  Neurological    Normal    Tremor (If present document)  Present slight left hand tremor    Vitals:  /67 (BP Location: Right arm, Patient Position: Right side, Cuff Size: Adult Regular)   Pulse 94   Temp 97.9  F (36.6  C) (Oral)   Resp 20   Ht 1.57 m (5' 1.81\")   Wt 50.7 kg (111 lb 12.4 oz)   SpO2 96%   BMI 20.57 kg/m    BSA 1.49 m        COVID: No symptoms, chills, shortness of breath, or difficulty breathing, muscle or body aches, headache, loss of taste or smell, sore throat, runny nose, congestion, nausea, vomiting or diarrhea according to the [place] Department of Health and Human Services based on the CARES Act.     COVID Vaccinations:   Immunization History   Administered Date(s) Administered     COVID-19,PF,Pfizer (12+ Yrs) 03/05/2021, 03/27/2021, 10/08/2021, 04/01/2022     COVID-19,PF,Pfizer 12+ Yrs (2022 and After) 04/01/2022     Pneumococcal 20 valent Conjugate (Prevnar 20) 04/16/2022       Smoking History  Are you currently smoking or vaping? No  How Many Years Have You Smoked or Vaped? 46 years (1/1/1968-1/1/2014)  Packs or E-Cigs Per Day: 2 PPD for the first 40 years; then 0.5 PPD for the last 6 years before quitting     Electrocardiogram   ECG not applicable as subject is in the respiratory cohort.     Respiratory Conditions:   COPD    Spirometer Test Results (FEV%): 42  Condition Severity: Severe - Stage 3 (FEV 30-49%)      Anali Vallejo PA-C       "

## 2022-07-12 NOTE — PROGRESS NOTES
Dez Study At-Home Note      Study description: Multiconditions PPG Sub-Study. The purpose of this research study is to collect data related to health for the development of mobile technologies. This data will include physiological signal recordings from medical devices and smart watch data collection software. This study is not to provide any treatment. This study will only collect information for research and  purposes.     Subject ID:  [id number]       SCREENING     Demographic Info  Sienna Bennett   1950          71 year old  female    No past medical history on file.    Current Outpatient Medications:      aspirin 81 MG chewable tablet, Take 81 mg by mouth daily., Disp: , Rfl:      Cholecalciferol (VITAMIN D PO), Take  by mouth., Disp: , Rfl:      fluticasone (FLONASE) 50 MCG/ACT nasal spray, Spray 2 sprays into both nostrils daily (Patient taking differently: Spray 2 sprays into both nostrils daily As needed.), Disp: 48 g, Rfl: 3     hydroxyurea (HYDREA) 500 MG capsule, Take 1 capsule (500 mg) by mouth daily 5 days/week with 1000 mg the other 2 days, Disp: 75 capsule, Rfl: 6     levalbuterol (XOPENEX HFA) 45 MCG/ACT inhaler, Inhale 2 puffs into the lungs every 4 hours as needed for shortness of breath / dyspnea or wheezing (Patient not taking: Reported on 6/23/2022), Disp: 15 g, Rfl: 1     loratadine-pseudoePHEDrine (CLARITIN-D 24-HOUR)  MG 24 hr tablet, Take 1 tablet by mouth daily, Disp: , Rfl:      rosuvastatin (CRESTOR) 20 MG tablet, Take 1 tablet (20 mg) by mouth daily, Disp: 90 tablet, Rfl: 3     umeclidinium (INCRUSE ELLIPTA) 62.5 MCG/INH inhaler, Inhale 1 puff into the lungs daily, Disp: 90 each, Rfl: 1    Allergies   Allergen Reactions     Wellbutrin [Bupropion]         Past Surgical History:   Procedure Laterality Date     HAND SURGERY       THYROID SURGERY              Child-Bearing Potential?: No     Race: White  Race (Secondary): N/A     :  "No     Ethnicity: Non-/     Vitals:  Time Subject Sat: 1:30   /67 (BP Location: Right arm, Patient Position: Right side, Cuff Size: Adult Regular)   Pulse 94   Temp 97.9  F (36.6  C) (Oral)   Resp 20   Ht 1.57 m (5' 1.81\")   Wt 50.7 kg (111 lb 12.4 oz)   SpO2 96%   BMI 20.57 kg/m       Sponsor Expected Values   Blood Pressure: SBP: ; DBP: 40-90  Pulse:  bpm  Temp: 35.5-37.5  C  Respiration: 10-23  Ht: in cm  Wt: in kg  SpO2%: %  BMI: Rounded to nearest whole number      Screening Info:     Respiratory Conditions: COPD     Spirometer Test Results (FEV%): 42     Condition Severity: Severe - Stage 3 (FEV 30-49%)      Sleep Conditions:  Sleep Apnea Diagnosis: No  Use of CPAP at Night: No  Stop Breathing or Gasping/Gurgling Sounds at Night: No  Snoring at Night: No    SPO2%  ? 95% during 3 minutes? No    Oxygen Therapy: No    Minutes of Exercise per Week: >60  Type of Activity: Cardio       Measurements & Preferences:  Dominant Hand: Right   Preferred Watch Hand: Left    Volunteer-Reported Johnson Scale: 3  Staff-Recorded Johnson Scale: 3     Hairiness Level: A: Thin Hair, Low Density      Wrist Circumference:  Left: 155 mm                                                         Right: 153 mm                     Spectrometer Values:                                               Left:   L*: 64.40          A*: 6.97            B*: 14.27                                    Right: L*: 62.42          A*: 8.47            B*: 17.46               STUDY PROCEDURE DATA      Study Date: 07/12/22  Study Time (Education Start Time): 3:00:00   Device IDs:  Day Watch ID 1: [id number]    Night Watch ID 2: [id number]    Watch Enclosure 1: Aluminum      Watch Enclosure 2: Aluminum   Watch Size 1: 44 mm  Watch Size 2: 44 mm  Nonin ID 1: [id number]   Nonin ID 2: [id number]   Lifestyle:  Moisturizing Cream/Lotion applied at wrist? No    12-JUL-2022  Sara Behmanesh    "

## 2022-07-13 ENCOUNTER — VIRTUAL VISIT (OUTPATIENT)
Dept: CARDIOLOGY | Facility: CLINIC | Age: 72
End: 2022-07-13
Payer: COMMERCIAL

## 2022-07-13 DIAGNOSIS — Z00.6 EXAMINATION OF PARTICIPANT OR CONTROL IN CLINICAL RESEARCH: Primary | ICD-10-CM

## 2022-07-13 PROCEDURE — 99207 PR NO CHARGE NURSE ONLY: CPT

## 2022-07-13 NOTE — PROGRESS NOTES
"Oban Study At-Home Phone Visit    Study description:   Multiconditions PPG Sub-Study. The purpose of this research study is to collect data related to health for the development of mobile technologies. This data will include physiological signal recordings from medical devices and smart watch data collection software. This study is not to provide any treatment. This study will only collect information for research and  purposes.       Subject Number: [id number]      Study Day: Day 2    Oban study subject was called today to;     Confirm subjects are following the subject instructions     Address any technical difficulties     Answer any questions        Reminders Checklist:   [x]  Unlock the watches: Passcode - [id number]    -When placing them on the  or on themselves   [x]  Visually confirm Wi-Fi connection and charging setup  [x]  Unlock phones   -Unlock before placing on   -Should NOT show lock icon  [x]  Check Flubber lukasz for incomplete surveys   -Morning surveys: End of Night task on NIGHT phone    -Evening surveys: End of Day task on DAY phone  [x]  Take devices off before showering/getting them wet  [x]  Make sure to dismiss any update notifications. -Tap \"Not Now\"  [x]  Good Nonin wear    -While resting/relaxing, not while typing doing anything hand intensive   [x]  Remind them of next appointment with us     Adverse Events & Con Med Assessment Performed?   [x]  (If yes, please generate adverse event report for PI to britney)      13-JUL-2022    Sara Behmanesh    "

## 2022-07-15 ENCOUNTER — ALLIED HEALTH/NURSE VISIT (OUTPATIENT)
Dept: CARDIOLOGY | Facility: CLINIC | Age: 72
End: 2022-07-15
Payer: COMMERCIAL

## 2022-07-15 DIAGNOSIS — Z00.6 EXAMINATION OF PARTICIPANT OR CONTROL IN CLINICAL RESEARCH: Primary | ICD-10-CM

## 2022-07-15 PROCEDURE — 99207 PR NO CHARGE NURSE ONLY: CPT

## 2022-07-15 NOTE — PROGRESS NOTES
Dez Study At-Home On-Site Visit    Study description:   Multiconditions PPG Sub-Study. The purpose of this research study is to collect data related to health for the development of mobile technologies. This data will include physiological signal recordings from medical devices and smart watch data collection software. This study is not to provide any treatment. This study will only collect information for research and  purposes.       Subject Number: [id number]    Study Day: Day 4    Sienna Bennett a 71 year old female, was onsite today to return their first Nonin device and receive their third Nonin devices, per study protocol. With the new device, the subject was reminded to continue following the subject instructions. All questions were answered to their satisfaction.     Nonin ID 3: [id number]     Adverse Events & Con Med Assessment Performed?   [x]     Subject reported great dissatisfaction with the persnicketiness of the Nonin devices. Writer agreed.     15- JUL-2022    Clementina Maynard

## 2022-07-17 ASSESSMENT — ENCOUNTER SYMPTOMS
PARESTHESIAS: 0
FREQUENCY: 0
DIZZINESS: 0
ABDOMINAL PAIN: 0
FEVER: 0
HEMATURIA: 0
JOINT SWELLING: 0
DYSURIA: 0
NERVOUS/ANXIOUS: 0
EYE PAIN: 0
COUGH: 1
HEMATOCHEZIA: 0
SHORTNESS OF BREATH: 0
HEARTBURN: 0
ARTHRALGIAS: 0
PALPITATIONS: 0
NAUSEA: 0
SORE THROAT: 0
CHILLS: 0
HEADACHES: 0
MYALGIAS: 0
DIARRHEA: 0
WEAKNESS: 0
CONSTIPATION: 0
BREAST MASS: 0

## 2022-07-17 ASSESSMENT — ACTIVITIES OF DAILY LIVING (ADL): CURRENT_FUNCTION: NO ASSISTANCE NEEDED

## 2022-07-17 NOTE — PROGRESS NOTES
[facility] Medical Oncology Note       Date of visit: July 18, 2022  New Outpatient Clinic Note        Assessment:     1. Essential thrombocythemia, with stable platelet count on current dosing of hydroxyurea, 1000 mg p.o. Saturdays and Sundays, with 500 mg p.o. daily the rest of the week.  This has been stable for 20 years and the chance of this becoming a major issue for her is extraordinarily low.  There is a one in 20 chance of progressing to either myelofibrosis or an acute leukemia.  This is why I have continued  to see Kelsea and its been a pleasure to do so.  2. As has always been my concern, the major healthcare issue is that I think she is got COPD.  She smoked for many years, but has quit now for the last three or four.  Nonetheless I always felt she would benefit  by seeing a primary care physician who could have her on some inhaled steroids. Happily, she is now following with a wonderful PMD, Dr. Fitzpatrick. She has seen a pulmonologist and is on inhaled steroids.  She is seen a cardiologist.  And, most importantly, she is feeling better.  3. Multiple other psychosocial stressors discussed at length        Plan:     1. Continue current dosing of hydroxyurea  2. Continue current dosing of aspirin  3. Continue follow-up with Dr. Valle  4. Return to clinic with me in 6 months with a CBC as we continue in follow-up mode          Sanjay Lambert MD, MSc  Associate Professor of Medicine  [employer]  [facility]  [address] [place] [id number]  [phone]    __________________________________________________________________    CHIEF COMPLAINT     1. Essential thrombocythemia, diagnosed by bone marrow biopsy on 04/10/2001.  Kelsea has low risk features but has a significantly increased risk of thrombosis because of her long-term history of smoking.  She has quit for the last 4-5 years.  2. I have known pain for 20 years and she is always  refused to see a primary care provider even though it is my assumption that she has COPD based on exam findings.  She may also have hypertension and hyperlipidemia, things I do not manage.  She is always refused seeing somebody else.  What can I do?    History of Present Illness:       Hydroxyurea at varying doses.  She has been on this for 20 years.  Her current dose is 1000 mg p.o. daily on Saturdays and Sundays, with 500 mg daily the rest of the week    She also remains on a daily aspirin.      Interval history:       Kelsea is back for her visit    She was seen by her PCP, Dr. Valle, on 3/2022 (note reviewed); CT chest showed coronary artery calcification prompting referral. She was also scheduled for pulmonology referral and PFTs and started on a bronchodilator.     Feels better on inhalers    Had COVID on a cruise with her .    Dealing with the continued stresses of being alive    Denies current fevers or adenopathy.  There have been no recent infections.    She continues to get weaker.  She does not have much of an appetite.          Past Medical History:   I have reviewed this patient's past medical history   No past medical history on file.       Past Surgical History:    I have reviewed this patient's past surgical history       Social History:   Tobacco, ETOH, and rec drugs reviewed and as noted below with the following exceptions:  Kelsea is  to Jason, but has no biologic children.  She is originally from [place].  She has been traveling back and forth to [place] because her 93-year-old mother, who most recently lived with her and her  Jason, wanted to see family.  Kelsea's brothers think of her as a Saint.  She would like them to share some of the burden of their mother's care.            Family History:     Family History   Problem Relation Age of Onset     Hypertension Mother      Hyperlipidemia Mother      Cerebrovascular Disease Father         At age 86     Prostate Cancer Father       Hypertension Brother      Other Cancer Brother         Bladder, Lung            Medications:     Current Outpatient Medications   Medication Sig Dispense Refill     aspirin 81 MG chewable tablet Take 81 mg by mouth daily.       Cholecalciferol (VITAMIN D PO) Take  by mouth.       fluticasone (FLONASE) 50 MCG/ACT nasal spray Spray 2 sprays into both nostrils daily (Patient taking differently: Spray 2 sprays into both nostrils daily As needed.) 48 g 3     hydroxyurea (HYDREA) 500 MG capsule Take 1 capsule (500 mg) by mouth daily 5 days/week with 1000 mg the other 2 days 75 capsule 6     levalbuterol (XOPENEX HFA) 45 MCG/ACT inhaler Inhale 2 puffs into the lungs every 4 hours as needed for shortness of breath / dyspnea or wheezing (Patient not taking: Reported on 6/23/2022) 15 g 1     loratadine-pseudoePHEDrine (CLARITIN-D 24-HOUR)  MG 24 hr tablet Take 1 tablet by mouth daily       rosuvastatin (CRESTOR) 20 MG tablet Take 1 tablet (20 mg) by mouth daily 90 tablet 3     umeclidinium (INCRUSE ELLIPTA) 62.5 MCG/INH inhaler Inhale 1 puff into the lungs daily 90 each 1              Physical Exam:   There were no vitals taken for this visit.    ECOG PS: 0  Constitutional: WDWN female in NAD, pleasant and appropriate  Neurologic: alert, answering questions appropriately, moving all extremities spontaneously. CN 2-12 grossly intact.  Psych: appropriate affect  No further exam could be done because this was a virtual visit  Data:   No results for input(s): WBC, NEUTROPHIL, HGB, PLT in the last 77304 hours.   Latest Reference Range & Units 07/18/22 11:27   WBC 4.0 - 11.0 10e3/uL 5.2   Hemoglobin 11.7 - 15.7 g/dL 13.9   Hematocrit 35.0 - 47.0 % 40.2   Platelet Count 150 - 450 10e3/uL 490 (H)   RBC Count 3.80 - 5.20 10e6/uL 3.65 (L)   MCV 78 - 100 fL 110 (H)   MCH 26.5 - 33.0 pg 38.1 (H)   MCHC 31.5 - 36.5 g/dL 34.6   RDW 10.0 - 15.0 % 12.4   (H): Data is abnormally high  (L): Data is abnormally low    No results found  for this or any previous visit (from the past 24 hour(s)).    Other Data           Labs, imaging and treatment plan reviewed with patient. All questions answered.        30 minutes spent on the date of the encounter doing chart review, review of outside records, review of test results, interpretation of tests, patient visit and documentation

## 2022-07-18 ENCOUNTER — PATIENT OUTREACH (OUTPATIENT)
Dept: ONCOLOGY | Facility: CLINIC | Age: 72
End: 2022-07-18

## 2022-07-18 ENCOUNTER — ONCOLOGY VISIT (OUTPATIENT)
Dept: ONCOLOGY | Facility: CLINIC | Age: 72
End: 2022-07-18
Attending: INTERNAL MEDICINE
Payer: COMMERCIAL

## 2022-07-18 ENCOUNTER — VIRTUAL VISIT (OUTPATIENT)
Dept: CARDIOLOGY | Facility: CLINIC | Age: 72
End: 2022-07-18
Payer: COMMERCIAL

## 2022-07-18 ENCOUNTER — APPOINTMENT (OUTPATIENT)
Dept: LAB | Facility: CLINIC | Age: 72
End: 2022-07-18
Attending: INTERNAL MEDICINE
Payer: COMMERCIAL

## 2022-07-18 VITALS
RESPIRATION RATE: 16 BRPM | DIASTOLIC BLOOD PRESSURE: 86 MMHG | BODY MASS INDEX: 20.17 KG/M2 | SYSTOLIC BLOOD PRESSURE: 120 MMHG | OXYGEN SATURATION: 96 % | HEART RATE: 119 BPM | WEIGHT: 109.6 LBS

## 2022-07-18 DIAGNOSIS — Z00.6 EXAMINATION OF PARTICIPANT OR CONTROL IN CLINICAL RESEARCH: Primary | ICD-10-CM

## 2022-07-18 DIAGNOSIS — D47.3 ESSENTIAL THROMBOCYTHEMIA (H): ICD-10-CM

## 2022-07-18 LAB
BASOPHILS # BLD AUTO: 0 10E3/UL (ref 0–0.2)
BASOPHILS NFR BLD AUTO: 1 %
EOSINOPHIL # BLD AUTO: 0.1 10E3/UL (ref 0–0.7)
EOSINOPHIL NFR BLD AUTO: 2 %
ERYTHROCYTE [DISTWIDTH] IN BLOOD BY AUTOMATED COUNT: 12.4 % (ref 10–15)
HCT VFR BLD AUTO: 40.2 % (ref 35–47)
HGB BLD-MCNC: 13.9 G/DL (ref 11.7–15.7)
IMM GRANULOCYTES # BLD: 0.1 10E3/UL
IMM GRANULOCYTES NFR BLD: 1 %
LYMPHOCYTES # BLD AUTO: 1.1 10E3/UL (ref 0.8–5.3)
LYMPHOCYTES NFR BLD AUTO: 20 %
MCH RBC QN AUTO: 38.1 PG (ref 26.5–33)
MCHC RBC AUTO-ENTMCNC: 34.6 G/DL (ref 31.5–36.5)
MCV RBC AUTO: 110 FL (ref 78–100)
MONOCYTES # BLD AUTO: 0.4 10E3/UL (ref 0–1.3)
MONOCYTES NFR BLD AUTO: 8 %
NEUTROPHILS # BLD AUTO: 3.6 10E3/UL (ref 1.6–8.3)
NEUTROPHILS NFR BLD AUTO: 68 %
NRBC # BLD AUTO: 0 10E3/UL
NRBC BLD AUTO-RTO: 0 /100
PLATELET # BLD AUTO: 490 10E3/UL (ref 150–450)
RBC # BLD AUTO: 3.65 10E6/UL (ref 3.8–5.2)
WBC # BLD AUTO: 5.2 10E3/UL (ref 4–11)

## 2022-07-18 PROCEDURE — 99214 OFFICE O/P EST MOD 30 MIN: CPT | Performed by: INTERNAL MEDICINE

## 2022-07-18 PROCEDURE — G0463 HOSPITAL OUTPT CLINIC VISIT: HCPCS

## 2022-07-18 PROCEDURE — 99207 PR NO CHARGE NURSE ONLY: CPT

## 2022-07-18 PROCEDURE — 36415 COLL VENOUS BLD VENIPUNCTURE: CPT | Performed by: INTERNAL MEDICINE

## 2022-07-18 PROCEDURE — 85025 COMPLETE CBC W/AUTO DIFF WBC: CPT | Performed by: INTERNAL MEDICINE

## 2022-07-18 ASSESSMENT — PAIN SCALES - GENERAL: PAINLEVEL: NO PAIN (0)

## 2022-07-18 NOTE — LETTER
7/18/2022         RE: Sienna Bennett  [address]  [place] [address]        Dear Colleague,    Thank you for referring your patient, Sienna Bennett, to the [facility]. Please see a copy of my visit note below.          [facility] Medical Oncology Note       Date of visit: July 18, 2022  New Outpatient Clinic Note        Assessment:     1. Essential thrombocythemia, with stable platelet count on current dosing of hydroxyurea, 1000 mg p.o. Saturdays and Sundays, with 500 mg p.o. daily the rest of the week.  This has been stable for 20 years and the chance of this becoming a major issue for her is extraordinarily low.  There is a one in 20 chance of progressing to either myelofibrosis or an acute leukemia.  This is why I have continued  to see Kelsea and its been a pleasure to do so.  2. As has always been my concern, the major healthcare issue is that I think she is got COPD.  She smoked for many years, but has quit now for the last three or four.  Nonetheless I always felt she would benefit  by seeing a primary care physician who could have her on some inhaled steroids. Happily, she is now following with a wonderful PMD, Dr. Fitzpatrick. She has seen a pulmonologist and is on inhaled steroids.  She is seen a cardiologist.  And, most importantly, she is feeling better.  3. Multiple other psychosocial stressors discussed at length        Plan:     1. Continue current dosing of hydroxyurea  2. Continue current dosing of aspirin  3. Continue follow-up with Dr. Valle  4. Return to clinic with me in 6 months with a CBC as we continue in follow-up mode          Sanjay Lambert MD, MSc  Associate Professor of Medicine  [employer]  [facility]  [address] [place] [id number]  [phone]    __________________________________________________________________    CHIEF COMPLAINT     1. Essential thrombocythemia,  diagnosed by bone marrow biopsy on 04/10/2001.  Kelsea has low risk features but has a significantly increased risk of thrombosis because of her long-term history of smoking.  She has quit for the last 4-5 years.  2. I have known pain for 20 years and she is always refused to see a primary care provider even though it is my assumption that she has COPD based on exam findings.  She may also have hypertension and hyperlipidemia, things I do not manage.  She is always refused seeing somebody else.  What can I do?    History of Present Illness:       Hydroxyurea at varying doses.  She has been on this for 20 years.  Her current dose is 1000 mg p.o. daily on Saturdays and Sundays, with 500 mg daily the rest of the week    She also remains on a daily aspirin.      Interval history:       Kelsea is back for her visit    She was seen by her PCP, Dr. Valle, on 3/2022 (note reviewed); CT chest showed coronary artery calcification prompting referral. She was also scheduled for pulmonology referral and PFTs and started on a bronchodilator.     Feels better on inhalers    Had COVID on a cruise with her .    Dealing with the continued stresses of being alive    Denies current fevers or adenopathy.  There have been no recent infections.    She continues to get weaker.  She does not have much of an appetite.          Past Medical History:   I have reviewed this patient's past medical history   No past medical history on file.       Past Surgical History:    I have reviewed this patient's past surgical history       Social History:   Tobacco, ETOH, and rec drugs reviewed and as noted below with the following exceptions:  Kelsea is  to Jason, but has no biologic children.  She is originally from [place].  She has been traveling back and forth to [place] because her 93-year-old mother, who most recently lived with her and her  Jason, wanted to see family.  Kelsea's brothers think of her as a Saint.  She would like them  to share some of the burden of their mother's care.            Family History:     Family History   Problem Relation Age of Onset     Hypertension Mother      Hyperlipidemia Mother      Cerebrovascular Disease Father         At age 86     Prostate Cancer Father      Hypertension Brother      Other Cancer Brother         Bladder, Lung            Medications:     Current Outpatient Medications   Medication Sig Dispense Refill     aspirin 81 MG chewable tablet Take 81 mg by mouth daily.       Cholecalciferol (VITAMIN D PO) Take  by mouth.       fluticasone (FLONASE) 50 MCG/ACT nasal spray Spray 2 sprays into both nostrils daily (Patient taking differently: Spray 2 sprays into both nostrils daily As needed.) 48 g 3     hydroxyurea (HYDREA) 500 MG capsule Take 1 capsule (500 mg) by mouth daily 5 days/week with 1000 mg the other 2 days 75 capsule 6     levalbuterol (XOPENEX HFA) 45 MCG/ACT inhaler Inhale 2 puffs into the lungs every 4 hours as needed for shortness of breath / dyspnea or wheezing (Patient not taking: Reported on 6/23/2022) 15 g 1     loratadine-pseudoePHEDrine (CLARITIN-D 24-HOUR)  MG 24 hr tablet Take 1 tablet by mouth daily       rosuvastatin (CRESTOR) 20 MG tablet Take 1 tablet (20 mg) by mouth daily 90 tablet 3     umeclidinium (INCRUSE ELLIPTA) 62.5 MCG/INH inhaler Inhale 1 puff into the lungs daily 90 each 1              Physical Exam:   There were no vitals taken for this visit.    ECOG PS: 0  Constitutional: WDWN female in NAD, pleasant and appropriate  Neurologic: alert, answering questions appropriately, moving all extremities spontaneously. CN 2-12 grossly intact.  Psych: appropriate affect  No further exam could be done because this was a virtual visit  Data:   No results for input(s): WBC, NEUTROPHIL, HGB, PLT in the last 37078 hours.   Latest Reference Range & Units 07/18/22 11:27   WBC 4.0 - 11.0 10e3/uL 5.2   Hemoglobin 11.7 - 15.7 g/dL 13.9   Hematocrit 35.0 - 47.0 % 40.2   Platelet  Count 150 - 450 10e3/uL 490 (H)   RBC Count 3.80 - 5.20 10e6/uL 3.65 (L)   MCV 78 - 100 fL 110 (H)   MCH 26.5 - 33.0 pg 38.1 (H)   MCHC 31.5 - 36.5 g/dL 34.6   RDW 10.0 - 15.0 % 12.4   (H): Data is abnormally high  (L): Data is abnormally low    No results found for this or any previous visit (from the past 24 hour(s)).    Other Data       Labs, imaging and treatment plan reviewed with patient. All questions answered.    30 minutes spent on the date of the encounter doing chart review, review of outside records, review of test results, interpretation of tests, patient visit and documentation           Again, thank you for allowing me to participate in the care of your patient.      Sincerely,    Sanjay Lambert MD

## 2022-07-18 NOTE — NURSING NOTE
"Oncology Rooming Note    July 18, 2022 11:44 AM   Sienna Bennett is a 71 year old female who presents for:    Chief Complaint   Patient presents with     Blood Draw     Labs drawn via  by RN in lab. VS taken.      Oncology Clinic Visit     Rtn for thrombocythemia     Initial Vitals: /86   Pulse 119   Resp 16   Wt 49.7 kg (109 lb 9.6 oz)   SpO2 96%   BMI 20.17 kg/m   Estimated body mass index is 20.17 kg/m  as calculated from the following:    Height as of 7/12/22: 1.57 m (5' 1.81\").    Weight as of this encounter: 49.7 kg (109 lb 9.6 oz). Body surface area is 1.47 meters squared.  No Pain (0) Comment: Data Unavailable   No LMP recorded. Patient is postmenopausal.  Allergies reviewed: Yes  Medications reviewed: Yes    Medications: Medication refills not needed today.  Pharmacy name entered into Pay[facility]: [facility] [id number] [place] [address]    Clinical concerns: Pt would like to discuss having prescriptions submitted for 3 months so that insurance will cover fully. MD was notified.      Gini Das, EMT            "

## 2022-07-18 NOTE — PROGRESS NOTES
[facility]: Cancer Care                                                                                          Introduced self and role of RN Care Coordinator at [facility]. Provided my contact information, [facility] phone number (which has options to talk with a Nurse available 24/7 - triage and RNCC via this option during business hours).     Reviewed current adjustments in clinic flow due to Covid-19 pandemic including addition of telemedicine visits, visitor restrictions, RNCCs working remotely at varying intervals, and Covid testing.    Reviewed [facility] care team members including midlevel providers in oncology dept and Dr. Lambert's usual clinic hours.    Pt voiced understanding and appreciation of above information and denies any further questions, and he/she understands that I will follow and provide coordination as needed    Signature:  Kerri Stevenson RN

## 2022-07-18 NOTE — NURSING NOTE
Chief Complaint   Patient presents with     Blood Draw     Labs drawn via  by RN in lab. VS taken.      Labs drawn via venipuncture. Vital signs taken. Checked into next appointment.   Mrina Lynch RN

## 2022-07-18 NOTE — PROGRESS NOTES
Oban Study At-Home Phone Visit    Study description:   [employer] Sub-Study. The purpose of this research study is to collect data related to health for the development of mobile technologies. This data will include physiological signal recordings from medical devices and smart [employer] data collection software. This study is not to provide any treatment. This study will only collect information for research and  purposes.       Subject Number: [id number]      Study Day: Day 8     Oban study subject was called today to;     Confirm subjects are following the subject instructions     Address any technical difficulties     Answer any questions    Participant put purple oximeter on, worn it for quite a while, but had issues with it prior to running out of battery. Participant troubleshooted per protocol, by unplugging and plugging the finger sensor. Participant is now wearing yellow pulse oximeter, and will continue to wear it until the battery runs out. Participant had no other questions or concerns at this time.      Adverse Events & Con Med Assessment Performed?   [x]     18-JUL-2022    Sara Behmanesh

## 2022-07-20 ENCOUNTER — OFFICE VISIT (OUTPATIENT)
Dept: FAMILY MEDICINE | Facility: CLINIC | Age: 72
End: 2022-07-20
Payer: COMMERCIAL

## 2022-07-20 VITALS
BODY MASS INDEX: 19.58 KG/M2 | DIASTOLIC BLOOD PRESSURE: 74 MMHG | OXYGEN SATURATION: 95 % | HEIGHT: 63 IN | TEMPERATURE: 98.6 F | RESPIRATION RATE: 14 BRPM | WEIGHT: 110.5 LBS | SYSTOLIC BLOOD PRESSURE: 136 MMHG | HEART RATE: 111 BPM

## 2022-07-20 DIAGNOSIS — Z12.11 SCREEN FOR COLON CANCER: ICD-10-CM

## 2022-07-20 DIAGNOSIS — R73.01 IMPAIRED FASTING GLUCOSE: ICD-10-CM

## 2022-07-20 DIAGNOSIS — Z00.00 ENCOUNTER FOR MEDICARE ANNUAL WELLNESS EXAM: Primary | ICD-10-CM

## 2022-07-20 DIAGNOSIS — E78.5 HYPERLIPIDEMIA LDL GOAL <100: ICD-10-CM

## 2022-07-20 DIAGNOSIS — E28.39 ESTROGEN DEFICIENCY: ICD-10-CM

## 2022-07-20 DIAGNOSIS — R94.6 THYROID FUNCTION TEST ABNORMAL: ICD-10-CM

## 2022-07-20 DIAGNOSIS — R06.09 DYSPNEA ON EXERTION: ICD-10-CM

## 2022-07-20 DIAGNOSIS — J44.9 CHRONIC OBSTRUCTIVE PULMONARY DISEASE, UNSPECIFIED COPD TYPE (H): ICD-10-CM

## 2022-07-20 DIAGNOSIS — Z11.59 NEED FOR HEPATITIS C SCREENING TEST: ICD-10-CM

## 2022-07-20 DIAGNOSIS — D47.3 ESSENTIAL THROMBOCYTHEMIA (H): ICD-10-CM

## 2022-07-20 DIAGNOSIS — H91.90 HEARING LOSS, UNSPECIFIED HEARING LOSS TYPE, UNSPECIFIED LATERALITY: ICD-10-CM

## 2022-07-20 DIAGNOSIS — B00.9 HERPES SIMPLEX VIRUS INFECTION: ICD-10-CM

## 2022-07-20 PROBLEM — D69.3 ESSENTIAL THROMBOCYTOPENIA (H): Status: RESOLVED | Noted: 2022-02-25 | Resolved: 2022-07-20

## 2022-07-20 PROCEDURE — 99214 OFFICE O/P EST MOD 30 MIN: CPT | Mod: 25 | Performed by: FAMILY MEDICINE

## 2022-07-20 PROCEDURE — 99397 PER PM REEVAL EST PAT 65+ YR: CPT | Performed by: FAMILY MEDICINE

## 2022-07-20 RX ORDER — LORATADINE 10 MG/1
10 TABLET ORAL PRN
COMMUNITY
End: 2023-11-02

## 2022-07-20 ASSESSMENT — ENCOUNTER SYMPTOMS
NERVOUS/ANXIOUS: 0
PALPITATIONS: 0
CHILLS: 0
EYE PAIN: 0
BREAST MASS: 0
JOINT SWELLING: 0
WEAKNESS: 0
ARTHRALGIAS: 0
COUGH: 1
HEMATOCHEZIA: 0
ABDOMINAL PAIN: 0
DYSURIA: 0
DIZZINESS: 0
HEARTBURN: 0
MYALGIAS: 0
SHORTNESS OF BREATH: 0
HEMATURIA: 0
HEADACHES: 0
NAUSEA: 0
CONSTIPATION: 0
SORE THROAT: 0
DIARRHEA: 0
FREQUENCY: 0
PARESTHESIAS: 0
FEVER: 0

## 2022-07-20 ASSESSMENT — PAIN SCALES - GENERAL: PAINLEVEL: NO PAIN (0)

## 2022-07-20 ASSESSMENT — ACTIVITIES OF DAILY LIVING (ADL): CURRENT_FUNCTION: NO ASSISTANCE NEEDED

## 2022-07-20 NOTE — PROGRESS NOTES
"SUBJECTIVE:   Sienna Bennett is a 71 year old female who presents for Preventive Visit.    Patient has been advised of split billing requirements and indicates understanding: Yes  Are you in the first 12 months of your Medicare coverage?  Yes,  Visual Acuity:  Right Eye: 20/30   Left Eye: 20/60  Both Eyes: 20/30    Healthy Habits:     In general, how would you rate your overall health?  Good    Frequency of exercise:  2-3 days/week    Duration of exercise:  15-30 minutes    Do you usually eat at least 4 servings of fruit and vegetables a day, include whole grains    & fiber and avoid regularly eating high fat or \"junk\" foods?  Yes    Taking medications regularly:  Yes    Medication side effects:  Not applicable    Ability to successfully perform activities of daily living:  No assistance needed    Home Safety:  No safety concerns identified    Hearing Impairment:  No hearing concerns    In the past 6 months, have you been bothered by leaking of urine?  No    In general, how would you rate your overall mental or emotional health?  Good      PHQ-2 Total Score: 0    Additional concerns today:  No    Do you feel safe in your environment? Yes    Have you ever done Advance Care Planning? (For example, a Health Directive, POLST, or a discussion with a medical provider or your loved ones about your wishes): Yes, patient states has an Advance Care Planning document and will bring a copy to the clinic.      Fall risk  Fallen 2 or more times in the past year?: No  Any fall with injury in the past year?: No    Cognitive Screening   1) Repeat 3 items (Leader, Season, Table)    2) Clock draw: NORMAL  3) 3 item recall: Recalls 2 objects   Results: NORMAL clock, 1-2 items recalled: COGNITIVE IMPAIRMENT LESS LIKELY    Mini-CogTM Copyright CARMELO Rae. Licensed by the author for use in [facility]; reprinted with permission (sofía@.[facility]). All rights reserved.      Do you have sleep apnea, excessive snoring or daytime " drowsiness?: no    Reviewed and updated as needed this visit by clinical staff   Tobacco  Allergies  Meds   Med Hx  Surg Hx  Fam Hx  Soc Hx          Reviewed and updated as needed this visit by Provider                   Social History     Tobacco Use     Smoking status: Former Smoker     Packs/day: 1.00     Years: 46.00     Pack years: 46.00     Types: Cigarettes     Start date: 1968     Quit date: 2014     Years since quittin.5     Smokeless tobacco: Never Used     Tobacco comment: Quit in    Substance Use Topics     Alcohol use: Yes     Comment: rare- maybe once a month       Alcohol Use 2022   Prescreen: >3 drinks/day or >7 drinks/week? No         Current providers sharing in care for this patient include:   Patient Care Team:  No Ref-Primary, Physician as PCP - General  Unassigned [facility] Eve Sheehan MD Bloom, Stuart Hunegs, MD as MD (Hematology & Oncology)  Kalli Valle MD as Assigned PCP  Sanjay Lambert MD as Assigned Cancer Care Provider  Kalli Valle MD as Referring Physician (Family Medicine)  Cami Villatoro MD as MD (Dermatology)  Bogdan Crespo MD as MD (Cardiovascular Disease)  Amber Baumann PA-C as Physician Assistant (Dermatology)  Kerri Stevenson, RN as Specialty Care Coordinator (Hematology & Oncology)    The following health maintenance items are reviewed in Epic and correct as of today:  Health Maintenance Due   Topic Date Due     DEXA  Never done     ANNUAL REVIEW OF HM ORDERS  Never done     COPD ACTION PLAN  Never done     COLORECTAL CANCER SCREENING  Never done     HEPATITIS C SCREENING  Never done     DTAP/TDAP/TD IMMUNIZATION (1 - Tdap) Never done     ZOSTER IMMUNIZATION (1 of 2) Never done       FHS-7:   Breast CA Risk Assessment (FHS-7) 3/28/2022   Did any of your first-degree relatives have breast or ovarian cancer? No   Did any of your relatives have bilateral breast cancer? No   Did any man in your family  have breast cancer? No   Did any woman in your family have breast and ovarian cancer? No   Did any woman in your family have breast cancer before age 50 y? No   Do you have 2 or more relatives with breast and/or ovarian cancer? No   Do you have 2 or more relatives with breast and/or bowel cancer? No       Mammogram Screening: Recommended annual mammography  Pertinent mammograms are reviewed under the imaging tab.    Review of Systems   Constitutional: Negative for chills and fever.   HENT: Positive for congestion. Negative for ear pain, hearing loss and sore throat.    Eyes: Positive for visual disturbance. Negative for pain.   Respiratory: Positive for cough. Negative for shortness of breath.    Cardiovascular: Negative for chest pain, palpitations and peripheral edema.   Gastrointestinal: Negative for abdominal pain, constipation, diarrhea, heartburn, hematochezia and nausea.   Breasts:  Negative for tenderness, breast mass and discharge.   Genitourinary: Negative for dysuria, frequency, genital sores, hematuria, pelvic pain, urgency, vaginal bleeding and vaginal discharge.   Musculoskeletal: Negative for arthralgias, joint swelling and myalgias.   Skin: Negative for rash.   Neurological: Negative for dizziness, weakness, headaches and paresthesias.   Psychiatric/Behavioral: Negative for mood changes. The patient is not nervous/anxious.      GOT covid on her cruise this summer.  Was very mild and recovered quickly  appt for pulmonary she had to post-pone due to her covid quarantine. appt is now next week.   Lot of chronic mucous- yellow. Take claritin daily. Steroid nasal spray- keeps her awake at night and uses the Flonase infrequently due to this.   Overall dyspnea is better with inhaler.     CV: Recently saw cardiology and reviewed that note.  CAD with angina, start crestor, mild aortic stenosis.   Exertional dyspnea- he felt this was most likely pulm related but could consider further w/u with coronary CTA if  "pulmonary work-up suggest more cardiac etiology. rec Repeat TTE in 3 years.     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    Not exercising much due to above.     Not had tetanus in many years.     Planning for cataract surgery soon  Wears hearing aids    Hr runs 80's usually at home but notes it was spike easily with exertion.  She has discussed this with the cardiologist at her recent visit      OBJECTIVE:   /74   Pulse 111   Temp 98.6  F (37  C) (Tympanic)   Resp 14   Ht 1.6 m (5' 3\")   Wt 50.1 kg (110 lb 8 oz)   SpO2 95%   BMI 19.57 kg/m   Estimated body mass index is 19.57 kg/m  as calculated from the following:    Height as of this encounter: 1.6 m (5' 3\").    Weight as of this encounter: 50.1 kg (110 lb 8 oz).  Physical Exam  GENERAL: healthy, alert and no distress  EYES: Eyes grossly normal to inspection, PERRL and conjunctivae and sclerae normal  HENT: ear canals and TM's normal, nose and mouth without ulcers or lesions  NECK: no adenopathy, no asymmetry, masses, or scars and thyroid normal to palpation  RESP: lungs clear to auscultation - no rales, rhonchi or wheezes  BREAST: normal without masses, tenderness or nipple discharge and no palpable axillary masses or adenopathy  CV: slightly tachycardic but regular rate , normal S1 S2, no S3 or S4, 1/6 systolic murmur, click or rub, no peripheral edema and peripheral pulses strong  ABDOMEN: soft, nontender, no hepatosplenomegaly, no masses and bowel sounds normal  MS: no gross musculoskeletal defects noted, no edema. Varicose veins distal extremeties, bunions 1st mtp  SKIN: no suspicious lesions or rashes  NEURO: Normal strength and tone, mentation intact and speech normal  PSYCH: mentation appears normal, affect normal/bright  No pelvic.    Diagnostic Test Results:  Labs reviewed in Epic    ASSESSMENT / PLAN:   (Z00.00) Encounter for Medicare annual wellness exam  (primary encounter diagnosis)      (J44.9) Chronic obstructive pulmonary " disease, unspecified COPD type (H)  Comment: This is a new diagnosis this last year for the patient.  She has a significant amount of chest congestion but her breathing has somewhat improved  Plan: We will hold off adding new prescription medications given she has a pulmonary appointment next week.  Discussed Mucinex and saline sprays to help with nasal symptoms if she cannot tolerate the Flonase.    (R94.6) Thyroid function test abnormal  Comment: This was noted with her last labs.  We will plan for repeat in 6 months  Plan: TSH with free T4 reflex            (R73.01) Impaired fasting glucose  Comment: This was noted on last labs.  Stopped drinking soda after this was found  Plan: Glucose, Hemoglobin A1c        We will plan for repeat in 6-month    (D47.3) Essential thrombocythemia (H)  Comment: She is following with hematology.  This has been stable on hydroxyurea  Plan: Per hematology    (E78.5) Hyperlipidemia LDL goal <100  Comment: Recently started statin per cardiology.  She is tolerating it well  Plan: Repeat lipid testing as planned by cardiology    (R06.00) Dyspnea on exertion  Comment: This is thought to be primarily pulmonary in etiology by her cardiologist (has mild aortic stenosis)  Plan: Consider further cardiac work-up if pulmonary feels it would be prudent    (Z12.11) Screen for colon cancer  Comment:   Plan: COLOGUARD(EXACT SCIENCES)            (Z11.59) Need for hepatitis C screening test  Comment:   Plan: Hepatitis C Screen Reflex to HCV RNA Quant and         Genotype            (B00.9) Herpes simplex virus infection  Comment: Recurrent in the past but not for a long time  Plan: Okay for acyclovir in the future if needed again    (E28.39) Estrogen deficiency  Comment:   Plan: DEXA HIP/PELVIS/SPINE - Future            (H91.90) Hearing loss, unspecified hearing loss type, unspecified laterality  Comment: Wears hearing aids  Plan: Monitor        COUNSELING:  Reviewed preventive health counseling, as  "reflected in patient instructions       Regular exercise       Healthy diet/nutrition       Vision screening       Hearing screening       Dental care       Osteoporosis prevention/bone health       Colon cancer screening       Hepatitis C screening       Advanced Planning     Estimated body mass index is 19.57 kg/m  as calculated from the following:    Height as of this encounter: 1.6 m (5' 3\").    Weight as of this encounter: 50.1 kg (110 lb 8 oz).    Weight management plan noted, stable and monitoring    She reports that she quit smoking about 8 years ago. Her smoking use included cigarettes. She started smoking about 54 years ago. She has a 46.00 pack-year smoking history. She has never used smokeless tobacco.       Appropriate preventive services were discussed with this patient, including applicable screening as appropriate for cardiovascular disease, diabetes, osteopenia/osteoporosis, and glaucoma.  As appropriate for age/gender, discussed screening for colorectal cancer, prostate cancer, breast cancer, and cervical cancer. Checklist reviewing preventive services available has been given to the patient.    Reviewed patients plan of care and provided an AVS. The Basic Care Plan (routine screening as documented in Health Maintenance) for Sienna meets the Care Plan requirement. This Care Plan has been established and reviewed with the Patient.    Counseling Resources:  ATP IV Guidelines  Pooled Cohorts Equation Calculator  Breast Cancer Risk Calculator  Breast Cancer: Medication to Reduce Risk  FRAX Risk Assessment  ICSI Preventive Guidelines  Dietary Guidelines for Americans, 2010  USDA's MyPlate  ASA Prophylaxis  Lung CA Screening    Kalli Valle MD  [facility]    Identified Health Risks:  "

## 2022-07-20 NOTE — PATIENT INSTRUCTIONS
For mucous consider trial of mucinex 600 mg twice daily.     Recommend completing with your pharmacy:  Tdap   Shingrix (shingrix)    Calcium needs 1,200 mg intake per 24 hours  Vitamin D-3 1,000 international unit(s) per day  Weight bearing exercise.     Please call  [facility] in [place] at [phone] to schedule bone density scan.     Plan for labs again for thyroid and diabetes in six months with your hematology labs.       Patient Education   Personalized Prevention Plan  You are due for the preventive services outlined below.  Your care team is available to assist you in scheduling these services.  If you have already completed any of these items, please share that information with your care team to update in your medical record.  Health Maintenance Due   Topic Date Due    Osteoporosis Screening  Never done    ANNUAL REVIEW OF HM ORDERS  Never done    COPD Action Plan  Never done    Colorectal Cancer Screening  Never done    Hepatitis C Screening  Never done    Diptheria Tetanus Pertussis (DTAP/TDAP/TD) Vaccine (1 - Tdap) Never done    Zoster (Shingles) Vaccine (1 of 2) Never done

## 2022-07-22 ENCOUNTER — VIRTUAL VISIT (OUTPATIENT)
Dept: CARDIOLOGY | Facility: CLINIC | Age: 72
End: 2022-07-22
Payer: COMMERCIAL

## 2022-07-22 DIAGNOSIS — Z00.6 EXAMINATION OF PARTICIPANT OR CONTROL IN CLINICAL RESEARCH: Primary | ICD-10-CM

## 2022-07-22 PROCEDURE — 99207 PR NO CHARGE NURSE ONLY: CPT

## 2022-07-22 NOTE — PROGRESS NOTES
Oban Study At-Home Phone Visit #3    Study description:   Multiconditions PPG Sub-Study. The purpose of this research study is to collect data related to health for the development of mobile technologies. This data will include physiological signal recordings from medical devices and smart watch data collection software. This study is not to provide any treatment. This study will only collect information for research and  purposes.       Subject Number: [id number]    Study Day: Day 10    Oban study subject was called today to;     Confirm subjects are following the subject instructions     Address any technical difficulties     Answer any questions    -3rd Nonin (Yellow dot)  last night ()   - Subject states compliance with nighttime procedure, was even filling out nighttime survey.  - Flubber lukasz up and running on day watch.    Adverse Events & Con Med Assessment Performed?   [x]     2022    Paulo Cuevas

## 2022-07-26 ENCOUNTER — ALLIED HEALTH/NURSE VISIT (OUTPATIENT)
Dept: CARDIOLOGY | Facility: CLINIC | Age: 72
End: 2022-07-26
Payer: COMMERCIAL

## 2022-07-26 DIAGNOSIS — Z00.6 EXAMINATION OF PARTICIPANT OR CONTROL IN CLINICAL RESEARCH: Primary | ICD-10-CM

## 2022-07-26 PROCEDURE — 99207 PR NO CHARGE NURSE ONLY: CPT

## 2022-07-27 ENCOUNTER — OFFICE VISIT (OUTPATIENT)
Dept: PULMONOLOGY | Facility: CLINIC | Age: 72
End: 2022-07-27
Payer: COMMERCIAL

## 2022-07-27 VITALS
DIASTOLIC BLOOD PRESSURE: 79 MMHG | OXYGEN SATURATION: 98 % | HEART RATE: 94 BPM | BODY MASS INDEX: 19.66 KG/M2 | SYSTOLIC BLOOD PRESSURE: 125 MMHG | WEIGHT: 111 LBS

## 2022-07-27 DIAGNOSIS — J43.2 CENTRILOBULAR EMPHYSEMA (H): Primary | ICD-10-CM

## 2022-07-27 DIAGNOSIS — Z87.891 HISTORY OF SMOKING: ICD-10-CM

## 2022-07-27 PROCEDURE — 99205 OFFICE O/P NEW HI 60 MIN: CPT | Mod: 25 | Performed by: INTERNAL MEDICINE

## 2022-07-27 PROCEDURE — G0296 VISIT TO DETERM LDCT ELIG: HCPCS | Performed by: INTERNAL MEDICINE

## 2022-07-27 RX ORDER — GUAIFENESIN 600 MG/1
1200 TABLET, EXTENDED RELEASE ORAL 2 TIMES DAILY
Qty: 120 TABLET | Refills: 11 | Status: SHIPPED | OUTPATIENT
Start: 2022-07-27 | End: 2022-08-26

## 2022-07-27 ASSESSMENT — PAIN SCALES - GENERAL: PAINLEVEL: NO PAIN (0)

## 2022-07-27 NOTE — PROGRESS NOTES
Pulmonary Clinic New Patient Consult  Reason for Consult: COPD  History of Present Illness  I had the pleasure of seeing Sienna Bennett, who is a 71-year-old female who presents to clinic for an evaluation and management of COPD.  To briefly review, Kelsea was diagnosed with COPD over the last several years based on symptoms alone.  Her symptoms include daily coughing which is productive of tenacious, occasionally copious yellowish sputum, usually worse in the morning with associated dyspnea on exertion.  She also has underlying seasonal allergies with rhinitis and postnasal drip.  She was started by her primary care physician on a regimen of Incruse and levalbuterol as needed.  Since getting started on Incruse, her symptoms have improved but she still has significant remnant symptoms.  She denies any COPD flares nor pneumonia.  She was recently worked up for her dyspnea with an echocardiogram which showed mild aortic stenosis.    She denies any chest pains, no orthopnea, no PND and no pedal swellings.  No dysphonia and no dysphagia.  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 Brother was diagnosed with lung cancer and he was a smoker. No exposure to asbestos, dust or toxic fumes.    Review of Systems:  10 of 14 systems reviewed and are negative unless otherwise stated in HPI.    Past Medical History:   Diagnosis Date     COPD (chronic obstructive pulmonary disease) (H) 2014     Essential thrombocythemia (H) 7/20/2022     Heart disease 2022       Past Surgical History:   Procedure Laterality Date     BIOPSY Left ?    breast bx from calcifications, benign     ORTHOPEDIC SURGERY  1967    Tendon repair Hand     THYROID SURGERY      left part of thyroid gland- age 22       Family History   Problem Relation Age of Onset     Hypertension Mother      Hyperlipidemia Mother      Cerebrovascular  Disease Father         At age 86     Prostate Cancer Father      Hypertension Brother      Other Cancer Brother         Bladder, Lung       Social History     Socioeconomic History     Marital status:    Tobacco Use     Smoking status: Former Smoker     Packs/day: 1.00     Years: 46.00     Pack years: 46.00     Types: Cigarettes     Start date: 1968     Quit date: 2014     Years since quittin.5     Smokeless tobacco: Never Used     Tobacco comment: Quit in    Vaping Use     Vaping Use: Never used   Substance and Sexual Activity     Alcohol use: Yes     Comment: rare- maybe once a month     Drug use: No     Sexual activity: Not Currently     Birth control/protection: Post-menopausal   Other Topics Concern     Parent/sibling w/ CABG, MI or angioplasty before 65F 55M? No         Allergies   Allergen Reactions     Wellbutrin [Bupropion]          Current Outpatient Medications:      aspirin 81 MG chewable tablet, Take 81 mg by mouth daily., Disp: , Rfl:      Cholecalciferol (VITAMIN D PO), Take  by mouth., Disp: , Rfl:      fluticasone (FLONASE) 50 MCG/ACT nasal spray, Spray 2 sprays into both nostrils daily (Patient taking differently: Spray 2 sprays into both nostrils daily As needed.), Disp: 48 g, Rfl: 3     hydroxyurea (HYDREA) 500 MG capsule, Take 1 capsule (500 mg) by mouth daily 5 days/week with 1000 mg the other 2 days, Disp: 75 capsule, Rfl: 6     loratadine (CLARITIN) 10 MG tablet, Take 10 mg by mouth daily, Disp: , Rfl:      rosuvastatin (CRESTOR) 20 MG tablet, Take 1 tablet (20 mg) by mouth daily, Disp: 90 tablet, Rfl: 3     umeclidinium (INCRUSE ELLIPTA) 62.5 MCG/INH inhaler, Inhale 1 puff into the lungs daily, Disp: 90 each, Rfl: 1     levalbuterol (XOPENEX HFA) 45 MCG/ACT inhaler, Inhale 2 puffs into the lungs every 4 hours as needed for shortness of breath / dyspnea or wheezing (Patient not taking: No sig reported), Disp: 15 g, Rfl: 1      Physical Exam:  /79 (BP Location:  Left arm, Patient Position: Sitting, Cuff Size: Adult Regular)   Pulse 94   Wt 50.3 kg (111 lb)   SpO2 98%   BMI 19.66 kg/m    GENERAL: Well developed, well nourished, alert, and in no apparent distress.  HEENT: Normocephalic, atraumatic. PERRL, EOMI. Oral mucosa is moist. No perioral cyanosis.  NECK: supple, no masses, no thyromegaly.  RESP:  Normal respiratory effort.  Markedly reduced breath sounds.  No rales, wheezes, rhonchi.  No cyanosis or clubbing.  CV: Normal S1, S2, regular rhythm, normal rate. No murmur.  No LE edema.   ABDOMEN:  Soft, non-tender, non-distended.   SKIN: warm and dry. No rash.  NEURO: AAOx3.  Normal gait.  Fluent speech.  PSYCH: mentation appears normal.       Results:  PFTs: Reviewed and discussed with patient-moderate obstruction with diffusion impairment.  Air trapping and hyperinflation  Most Recent [facility] Pulmonary Function Testing    FVC-Pred   Date Value Ref Range Status   04/05/2022 2.71 L      FVC-Pre   Date Value Ref Range Status   04/05/2022 2.41 L      FVC-%Pred-Pre   Date Value Ref Range Status   04/05/2022 89 %      FEV1-Pre   Date Value Ref Range Status   04/05/2022 0.89 L      FEV1-%Pred-Pre   Date Value Ref Range Status   04/05/2022 42 %      FEV1FVC-Pred   Date Value Ref Range Status   04/05/2022 78 %      FEV1FVC-Pre   Date Value Ref Range Status   04/05/2022 37 %      No results found for: 20029  FEFMax-Pred   Date Value Ref Range Status   04/05/2022 5.45 L/sec      FEFMax-Pre   Date Value Ref Range Status   04/05/2022 3.02 L/sec      FEFMax-%Pred-Pre   Date Value Ref Range Status   04/05/2022 55 %      ExpTime-Pre   Date Value Ref Range Status   04/05/2022 9.66 sec      FIFMax-Pre   Date Value Ref Range Status   04/05/2022 3.07 L/sec      FEV1FEV6-Pred   Date Value Ref Range Status   04/05/2022 79 %      FEV1FEV6-Pre   Date Value Ref Range Status   04/05/2022 43 %      No results found for: 20055  Imaging (personally reviewed in clinic today): CT Chest Lung  Ca  Impression:   1. ACR Assessment Category (v1.1):  Lung-RADS Category 2. Benign  appearance or behavior.  Recommendation:  Lung-RADS Category 2. Benign appearance or behavior.  Recommendation:  continue annual screening with Lung cancer screening CT (please order exam code [id number]).    2. Significant Incidental Finding(s):  Category S: Yes.  a.  coronary artery calcium moderate or severe . Aortic valve calcification moderate. Cardiology consult may be helpful  b.  Moderate apical predominant centrilobular emphysematous changes.    c.  Age-indeterminate mild anterior wedge compression deformity of T11 with superimposed Schmorl's node deformity.     3. Scattered distal mucous plugging and scarring suggestion bronchiolitis.   4. Additional incidental findings including calcifications at the left breast skin surface for which mammographic correlation is recommended      Assessment and Plan:   COPD (Group D)   Significant obstruction on PFTs with emphysematous changes on radiologic imaging.  She does have a CAT score of 21.  She is currently on a regimen of Incruse and lev albuterol as needed but continues to have significant remnant symptoms with productive cough, chest congestion and dyspnea on exertion.  She would benefit from an escalation of her regimen to triple inhaler regimen.  Prescriptions for Trelegy was given today with refills and she knows to rinse her mouth after each use.  She will also benefit from adding guaifenesin/Mucinex for better airway clearance and prescriptions were given with refills.  I also encouraged her to use her rescue inhaler, and this gives levalbuterol as often as she needs to.  I will also consider adding chronic low-dose azithromycin in the future as she seems to have a chronic bronchitic phenotype.  She is fairly active and I will hold off on referring her to pulmonary rehab at this time  History of Smoking/Pulmonary Nodules  Lung Cancer Screening Shared Decision Making Visit      Sienna Bennett is eligible for lung cancer screening on the basis of:   has not experienced symptoms suggestive of lung cancer.   born on 1950, 71 year old years old.   smoked 1 packs of cigarettes for 46 years for a total of 46 pack-years   has quit smoking 8 years ago  I have discussed with patient the risks and benefits of screening for lung cancer with low-dose CT.     The risks include:   radiation exposure    false positives     over-diagnosis    The benefit of early detection of lung cancer is contingent upon adherence to annual screening or more frequent follow up if indicated.     Furthermore, reaping the benefits of screening requires Sienna Bennett to be willing and able to undergo diagnostic procedures, if indicated.     We did discuss that the only way to prevent lung cancer is to not smoke.     Questions and concerns were answered to the patient's satisfaction.  she was provided with my contact information should new questions or concerns arise in the interim.  she should return to clinic in 6 months with PFTs and 6 min walk test  Up to date on vaccination  I spent a total of 60 minutes face to face with Sienna Bennett during today's office visit excluding time spent for lung cancer screening. Over 50% of this time was spent counseling the patient and/or coordinating care regarding their pulmonary disease.    Gilda Walker MD  Pulmonary, Critical Care and Sleep Medicine  [facility]  Pager: [phone]        The above note was dictated using voice recognition software and may include typographical errors. Please contact the author for any clarifications.

## 2022-07-27 NOTE — NURSING NOTE
Sienna Bennett's goals for this visit include: NO  She requests these members of her care team be copied on today's visit information: YES    PCP: No Ref-Primary, Physician    Referring Provider:  Kalli Valle MD  [address]  [place] [address]    /79 (BP Location: Left arm, Patient Position: Sitting, Cuff Size: Adult Regular)   Pulse 94   Wt 50.3 kg (111 lb)   SpO2 98%   BMI 19.66 kg/m      Do you need any medication refills at today's visit? NO    Jimbo Silveira

## 2022-08-03 ENCOUNTER — OFFICE VISIT (OUTPATIENT)
Dept: DERMATOLOGY | Facility: CLINIC | Age: 72
End: 2022-08-03
Attending: FAMILY MEDICINE
Payer: COMMERCIAL

## 2022-08-03 DIAGNOSIS — D49.2 NEOPLASM OF UNSPECIFIED BEHAVIOR OF BONE, SOFT TISSUE, AND SKIN: ICD-10-CM

## 2022-08-03 DIAGNOSIS — B00.9 HSV INFECTION: ICD-10-CM

## 2022-08-03 DIAGNOSIS — L57.0 ACTINIC KERATOSIS: ICD-10-CM

## 2022-08-03 DIAGNOSIS — L82.1 SEBORRHEIC KERATOSES: ICD-10-CM

## 2022-08-03 DIAGNOSIS — L72.0 MILIA: ICD-10-CM

## 2022-08-03 DIAGNOSIS — L81.4 SOLAR LENTIGO: Primary | ICD-10-CM

## 2022-08-03 DIAGNOSIS — D18.01 CHERRY ANGIOMA: ICD-10-CM

## 2022-08-03 DIAGNOSIS — D22.9 MULTIPLE BENIGN NEVI: ICD-10-CM

## 2022-08-03 PROCEDURE — 17003 DESTRUCT PREMALG LES 2-14: CPT | Performed by: PHYSICIAN ASSISTANT

## 2022-08-03 PROCEDURE — 11102 TANGNTL BX SKIN SINGLE LES: CPT | Performed by: PHYSICIAN ASSISTANT

## 2022-08-03 PROCEDURE — 88305 TISSUE EXAM BY PATHOLOGIST: CPT | Performed by: DERMATOLOGY

## 2022-08-03 PROCEDURE — 99214 OFFICE O/P EST MOD 30 MIN: CPT | Mod: 25 | Performed by: PHYSICIAN ASSISTANT

## 2022-08-03 PROCEDURE — 17000 DESTRUCT PREMALG LESION: CPT | Mod: XS | Performed by: PHYSICIAN ASSISTANT

## 2022-08-03 NOTE — NURSING NOTE
The following medication was given:     MEDICATION:  Lidocaine with epinephrine 1% [id number]  ROUTE: SQ  SITE: see procedure note  DOSE: 1.5ml  LOT #: [id number]  : [employer]  EXPIRATION DATE:12.1.22  NDC#: 5599-8910-81  Was there drug waste? 0ml  Multi-dose vial: Yes    Dionna Ruiz, LISA on 8/3/2022 at 1:49 PM

## 2022-08-03 NOTE — NURSING NOTE
Sienna Bennett's goals for this visit include:   Chief Complaint   Patient presents with     Skin Check     Has not seen derm before/ family hx of unknown skin cancer/ areas of concern tailbone area that gets sore, under L eye     She requests these members of her care team be copied on today's visit information:     PCP: No Ref-Primary, Physician    Referring Provider:  Kalli Valle MD  [address] [place] [address]    There were no vitals taken for this visit.    Do you need any medication refills at today's visit? No  Dionna Ruiz, LISA on 8/3/2022 at 12:42 PM

## 2022-08-03 NOTE — PATIENT INSTRUCTIONS
Cryotherapy    What is it?  Use of a very cold liquid, such as liquid nitrogen, to freeze and destroy abnormal skin cells that need to be removed    What should I expect?  Tenderness and redness  A small blister that might grow and fill with dark purple blood. There may be crusting.  More than one treatment may be needed if the lesions do not go away.    How do I care for the treated area?  Gently wash the area with your hands when bathing.  Use a thin layer of Vaseline to help with healing. You may use a Band-Aid.   The area should heal within 7-10 days and may leave behind a pink or lighter color.   Do not use an antibiotic or Neosporin ointment.   You may take acetaminophen (Tylenol) for pain.     Call your doctor if you have:  Severe pain  Signs of infection (warmth, redness, cloudy yellow drainage, and or a bad smell)  Questions or concerns    Who should I call with questions?      [facility]: [phone]      [facility]: [phone]      For urgent needs outside of business hours call the [facility] at [phone] and ask for the dermatology resident on call      Wound Care After a Biopsy    What is a skin biopsy?  A skin biopsy allows the doctor to examine a very small piece of tissue under the microscope to determine the diagnosis and the best treatment for the skin condition. A local anesthetic (numbing medicine)  is injected with a very small needle into the skin area to be tested. A small piece of skin is taken from the area. Sometimes a suture (stitch) is used.     What are the risks of a skin biopsy?  I will experience scar, bleeding, swelling, pain, crusting and redness. I may experience incomplete removal or recurrence. Risks of this procedure are excessive bleeding, bruising, infection, nerve damage, numbness, thick (hypertrophic or keloidal) scar and non-diagnostic biopsy.    How should I care for my wound for the first 24  hours?  Keep the wound dry and covered for 24 hours  If it bleeds, hold direct pressure on the area for 15 minutes. If bleeding does not stop then go to the emergency room  Avoid strenuous exercise the first 1-2 days or as your doctor instructs you    How should I care for the wound after 24 hours?  After 24 hours, remove the bandage  You may bathe or shower as normal  If you had a scalp biopsy, you can shampoo as usual and can use shower water to clean the biopsy site daily  Clean the wound twice a day with gentle soap and water  Do not scrub, be gentle  Apply white petroleum/Vaseline after cleaning the wound with a cotton swab or a clean finger, and keep the site covered with a Bandaid /bandage. Bandages are not necessary with a scalp biopsy  If you are unable to cover the site with a Bandaid /bandage, re-apply ointment 2-3 times a day to keep the site moist. Moisture will help with healing  Avoid strenuous activity for first 1-2 days  Avoid lakes, rivers, pools, and oceans until the stitches are removed or the site is healed    How do I clean my wound?  Wash hands thoroughly with soap or use hand  before all wound care  Clean the wound with gentle soap and water  Apply white petroleum/Vaseline  to wound after it is clean  Replace the Bandaid /bandage to keep the wound covered for the first few days or as instructed by your doctor  If you had a scalp biopsy, warm shower water to the area on a daily basis should suffice    What should I use to clean my wound?   Cotton-tipped applicators (Qtips )  White petroleum jelly (Vaseline ). Use a clean new container and use Q-tips to apply.  Bandaids   as needed  Gentle soap     How should I care for my wound long term?  Do not get your wound dirty  Keep up with wound care for one week or until the area is healed.  A small scab will form and fall off by itself when the area is completely healed. The area will be red and will become pink in color as it heals. Sun  protection is very important for how your scar will turn out. Sunscreen with an SPF 30 or greater is recommended once the area is healed.  You should have some soreness but it should be mild and slowly go away over several days. Talk to your doctor about using tylenol for pain,    When should I call my doctor?  If you have increased:   Pain or swelling  Pus or drainage (clear or slightly yellow drainage is ok)  Temperature over 100F  Spreading redness or warmth around wound    When will I hear about my results?  The biopsy results can take 2 weeks to come back.  Your results will automatically release to [facility] before your provider has even reviewed them.  The clinic will call you with the results, send you a [employer] message, or have you schedule a follow-up clinic or phone time to discuss the results.  Contact our clinics if you do not hear from us in 2 weeks.    Who should I call with questions?  [facility]: [phone]  [facility]: [phone]  For urgent needs outside of business hours call the [facility] at [phone] and ask for the dermatology resident on call

## 2022-08-03 NOTE — LETTER
8/3/2022         RE: Sienna Bennett  [address]  [place] [address]        Dear Colleague,    Thank you for referring your patient, Sienna Bennett, to the [facility]. Please see a copy of my visit note below.    [facility] Dermatology Note  Encounter Date: Aug 3, 2022  Office Visit     Dermatology Problem List:  1. History of AK  - cryo   2. NUB -  left gluteal cleft s/p bx 8/3/22  ____________________________________________    Assessment & Plan:    # Cherry angioma(s).    - No further intervention needed.    # Multiple clinically benign nevi.  - Signs and Symptoms of non-melanoma skin cancer and ABCDEs of melanoma reviewed with patient. Patient encouraged to perform monthly self skin exams and educated on how to perform them. UV precautions reviewed with patient. Patient was asked about new or changing moles/lesions on body.   - Sunscreen: Apply 20 minutes prior to going outdoors and reapply every two hours, when wet or sweating. We recommend using an SPF 30 or higher, and to use one that is water resistant.       - No further intervention needed.     # Seborrheic keratosis, non irritated.   - No further intervention needed.     # Solar lentigines.  - Sunscreen: Apply 20 minutes prior to going outdoors and reapply every two hours, when wet or sweating. We recommend using an SPF 30 or higher, and to use one that is water resistant.      - No further intervention needed.     # Milia - face  - No further intervention needed.    # Actinic keratosis - left upper cheek, left lateral nose  - See cryo note.     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 The differential diagnosis includes ISK vs NMSC vs other. .  - See procedure note.     # HSV - Right glute: hyperpigmented patch secondary to HSV flare up, no active lesions right now    Procedures Performed:   - Cryotherapy procedure note. After verbal consent and discussion of risks  and benefits including, but not limited to, dyspigmentation/scar, blister, and pain, 2 lesion(s) was(were) treated with 1-2 mm freeze border for 1-2 cycles with liquid nitrogen. Post cryotherapy instructions were provided.    - Shave biopsy procedure note, location(s): left gluteal cleft. After discussion of benefits and risks including but not limited to bleeding, infection, scar, incomplete removal, recurrence, and non-diagnostic biopsy, written consent and photographs were obtained. The area was cleaned with isopropyl alcohol. 0.5mL of 1% lidocaine with epinephrine was injected to obtain adequate anesthesia of lesion(s). Shave biopsy at site(s) performed. Hemostasis was achieved with aluminium chloride. Petrolatum ointment and a sterile dressing were applied. The patient tolerated the procedure and no complications were noted. The patient was provided with verbal and written post care instructions.     Follow-up: Pending path.     Staff and Scribe:     Scribe Disclosure:   German FARAH, am serving as a scribe to document services personally performed by Amber Baumann PA-C, based on data collection and the provider's statements to me.  Provider Disclosure:   The documentation recorded by the scribe accurately reflects the services I personally performed and the decisions made by me.    All risks, benefits and alternatives were discussed with patient.  Patient is in agreement and understands the assessment and plan.  All questions were answered.    Amber Baumann PA-C, MPAS  [facility] [place]: Phone: [phone], Fax: [phone]  [facility]: Phone: [phone],  Fax: [phone]  [facility]: Phone: [phone], Fax: [phone]  ____________________________________________    CC: Skin Check (Has not seen derm before/ family hx of unknown skin cancer/ areas of concern tailbone area that gets sore,  under L eye)    HPI:  Ms. Sienna Bennett is a(n) 71 year old female who presents today as a return patient for a skin check.     Referred to derm for skin exam. Notes on 2/15/22 were reviewed.    Today, patient notes concern under the left eye. Also notes concern on the tailbone. Patient denies any family history of melanoma skin cancer. Patient also notes concern on the buttocks that has been present since last winter. States the lesion does peel when it rubs on her underwear. States lesions on the right flank has been present since spring.     Patient has HSV on the R buttock, not flaring today. Denies hx of genital warts.     Patient is otherwise feeling well, without additional concerns.    Labs:  NA    Physical Exam:  Vitals: There were no vitals taken for this visit.  SKIN: Total skin excluding the undergarment areas was performed. The exam included the head/face, neck, both arms, chest, back, abdomen, both legs, digits and/or nails.   - Johnson Type l  - Right glute: hyperpigmented patch secondary to HSV flare up, no papules, vesicles or erosions currently  - Left gluteal cleft: 1 cm pink scaly lesion   - There are dome shaped bright red papules on the trunk and extremities.  - Multiple regular brown pigmented macules and papules are identified on the trunk and extremities.   - Scattered brown macules on sun exposed areas.  - There are waxy stuck on tan to brown papules on the trunk and extremities..   - There are white 1-2 mm papules on the face. .   - There are erythematous macules with overyling adherent scale on the left upper cheek and left lateral nose. .   - No other lesions of concern on areas examined.     Medications:  Current Outpatient Medications   Medication     aspirin 81 MG chewable tablet     Cholecalciferol (VITAMIN D PO)     fluticasone (FLONASE) 50 MCG/ACT nasal spray     Fluticasone-Umeclidin-Vilanterol (TRELEGY ELLIPTA) 200-62.5-25 MCG/INH oral inhaler     guaiFENesin (MUCINEX)  600 MG 12 hr tablet     hydroxyurea (HYDREA) 500 MG capsule     levalbuterol (XOPENEX HFA) 45 MCG/ACT inhaler     loratadine (CLARITIN) 10 MG tablet     rosuvastatin (CRESTOR) 20 MG tablet     No current facility-administered medications for this visit.      Past Medical History:   Patient Active Problem List   Diagnosis     Abnormal TSH     Impaired fasting glucose     Hyperlipidemia LDL goal <100     Chronic obstructive pulmonary disease, unspecified COPD type (H)     Abnormal CT scan, chest     Essential thrombocythemia (H)     Herpes simplex virus infection     Past Medical History:   Diagnosis Date     COPD (chronic obstructive pulmonary disease) (H) 2014     Essential thrombocythemia (H) 7/20/2022     Heart disease 2022        CC Kalli Valle MD  [address] [place] [address] on close of this encounter.        Again, thank you for allowing me to participate in the care of your patient.        Sincerely,        Amber Baumann PA-C

## 2022-08-03 NOTE — PROGRESS NOTES
[facility] Dermatology Note  Encounter Date: Aug 3, 2022  Office Visit     Dermatology Problem List:  1. History of AK  - cryo   2. NUB -  left gluteal cleft s/p bx 8/3/22  ____________________________________________    Assessment & Plan:    # Cherry angioma(s).    - No further intervention needed.    # Multiple clinically benign nevi.  - Signs and Symptoms of non-melanoma skin cancer and ABCDEs of melanoma reviewed with patient. Patient encouraged to perform monthly self skin exams and educated on how to perform them. UV precautions reviewed with patient. Patient was asked about new or changing moles/lesions on body.   - Sunscreen: Apply 20 minutes prior to going outdoors and reapply every two hours, when wet or sweating. We recommend using an SPF 30 or higher, and to use one that is water resistant.       - No further intervention needed.     # Seborrheic keratosis, non irritated.   - No further intervention needed.     # Solar lentigines.  - Sunscreen: Apply 20 minutes prior to going outdoors and reapply every two hours, when wet or sweating. We recommend using an SPF 30 or higher, and to use one that is water resistant.      - No further intervention needed.     # Milia - face  - No further intervention needed.    # Actinic keratosis - left upper cheek, left lateral nose  - See cryo note.     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 The differential diagnosis includes ISK vs NMSC vs other. .  - See procedure note.     # HSV - Right glute: hyperpigmented patch secondary to HSV flare up, no active lesions right now    Procedures Performed:   - Cryotherapy procedure note. After verbal consent and discussion of risks and benefits including, but not limited to, dyspigmentation/scar, blister, and pain, 2 lesion(s) was(were) treated with 1-2 mm freeze border for 1-2 cycles with liquid nitrogen. Post cryotherapy instructions were provided.    - Shave biopsy procedure note,  location(s): left gluteal cleft. After discussion of benefits and risks including but not limited to bleeding, infection, scar, incomplete removal, recurrence, and non-diagnostic biopsy, written consent and photographs were obtained. The area was cleaned with isopropyl alcohol. 0.5mL of 1% lidocaine with epinephrine was injected to obtain adequate anesthesia of lesion(s). Shave biopsy at site(s) performed. Hemostasis was achieved with aluminium chloride. Petrolatum ointment and a sterile dressing were applied. The patient tolerated the procedure and no complications were noted. The patient was provided with verbal and written post care instructions.     Follow-up: Pending path.     Staff and Scribe:     Scribe Disclosure:   German FARAH, am serving as a scribe to document services personally performed by Amber Baumann PA-C, based on data collection and the provider's statements to me.  Provider Disclosure:   The documentation recorded by the scribe accurately reflects the services I personally performed and the decisions made by me.    All risks, benefits and alternatives were discussed with patient.  Patient is in agreement and understands the assessment and plan.  All questions were answered.    Amber Baumann PA-C, MPAS  [facility] [place]: Phone: [phone], Fax: [phone]  [facility]: Phone: [phone],  Fax: [phone]  [facility]: Phone: [phone], Fax: [phone]  ____________________________________________    CC: Skin Check (Has not seen derm before/ family hx of unknown skin cancer/ areas of concern tailbone area that gets sore, under L eye)    HPI:  Ms. Sienna Bennett is a(n) 71 year old female who presents today as a return patient for a skin check.     Referred to derm for skin exam. Notes on 2/15/22 were reviewed.    Today, patient notes concern under the left eye. Also notes  concern on the tailbone. Patient denies any family history of melanoma skin cancer. Patient also notes concern on the buttocks that has been present since last winter. States the lesion does peel when it rubs on her underwear. States lesions on the right flank has been present since spring.     Patient has HSV on the R buttock, not flaring today. Denies hx of genital warts.     Patient is otherwise feeling well, without additional concerns.    Labs:  NA    Physical Exam:  Vitals: There were no vitals taken for this visit.  SKIN: Total skin excluding the undergarment areas was performed. The exam included the head/face, neck, both arms, chest, back, abdomen, both legs, digits and/or nails.   - Johnson Type l  - Right glute: hyperpigmented patch secondary to HSV flare up, no papules, vesicles or erosions currently  - Left gluteal cleft: 1 cm pink scaly lesion   - There are dome shaped bright red papules on the trunk and extremities.  - Multiple regular brown pigmented macules and papules are identified on the trunk and extremities.   - Scattered brown macules on sun exposed areas.  - There are waxy stuck on tan to brown papules on the trunk and extremities..   - There are white 1-2 mm papules on the face. .   - There are erythematous macules with overyling adherent scale on the left upper cheek and left lateral nose. .   - No other lesions of concern on areas examined.     Medications:  Current Outpatient Medications   Medication     aspirin 81 MG chewable tablet     Cholecalciferol (VITAMIN D PO)     fluticasone (FLONASE) 50 MCG/ACT nasal spray     Fluticasone-Umeclidin-Vilanterol (TRELEGY ELLIPTA) 200-62.5-25 MCG/INH oral inhaler     guaiFENesin (MUCINEX) 600 MG 12 hr tablet     hydroxyurea (HYDREA) 500 MG capsule     levalbuterol (XOPENEX HFA) 45 MCG/ACT inhaler     loratadine (CLARITIN) 10 MG tablet     rosuvastatin (CRESTOR) 20 MG tablet     No current facility-administered medications for this visit.       Past Medical History:   Patient Active Problem List   Diagnosis     Abnormal TSH     Impaired fasting glucose     Hyperlipidemia LDL goal <100     Chronic obstructive pulmonary disease, unspecified COPD type (H)     Abnormal CT scan, chest     Essential thrombocythemia (H)     Herpes simplex virus infection     Past Medical History:   Diagnosis Date     COPD (chronic obstructive pulmonary disease) (H) 2014     Essential thrombocythemia (H) 7/20/2022     Heart disease 2022        CC Kalli Valle MD  [address]  [facility] [place]  [place] [address] on close of this encounter.

## 2022-08-05 LAB
PATH REPORT.COMMENTS IMP SPEC: ABNORMAL
PATH REPORT.COMMENTS IMP SPEC: ABNORMAL
PATH REPORT.COMMENTS IMP SPEC: YES
PATH REPORT.FINAL DX SPEC: ABNORMAL
PATH REPORT.GROSS SPEC: ABNORMAL
PATH REPORT.MICROSCOPIC SPEC OTHER STN: ABNORMAL
PATH REPORT.RELEVANT HX SPEC: ABNORMAL

## 2022-08-06 ENCOUNTER — MYC MEDICAL ADVICE (OUTPATIENT)
Dept: FAMILY MEDICINE | Facility: CLINIC | Age: 72
End: 2022-08-06

## 2022-08-09 ENCOUNTER — ANCILLARY PROCEDURE (OUTPATIENT)
Dept: BONE DENSITY | Facility: CLINIC | Age: 72
End: 2022-08-09
Attending: FAMILY MEDICINE
Payer: COMMERCIAL

## 2022-08-09 DIAGNOSIS — E28.39 ESTROGEN DEFICIENCY: ICD-10-CM

## 2022-08-09 PROCEDURE — 77080 DXA BONE DENSITY AXIAL: CPT | Performed by: RADIOLOGY

## 2022-08-09 NOTE — RESULT ENCOUNTER NOTE
Hi Kelsea,    I've reviewed your recent bone density results. You have fairly significant osteoporosis of both your hips and spine increasing your risks substantially for fractures.     To help prevent further loss of bone, the following is recommended:    Take in adequate amounts of Calcium and Vitamin D. These are the building blocks for bones. Most women will need 1,200 mg of Calcium (through diet and supplements if needed) and 1,000 international units of Vitamin D daily (typically through supplements).    Exercise daily to keep your bones strong. Thirty minutes of moderate walking or other aerobic activity is recommended.    Please also consider making an appointment to discuss possible treatment options for medication that help preserve bone density and reduce the risk of fracture.    If you have any questions or concerns, please mychart or call.     Sincerely,    Kalli Valle M.D.

## 2022-08-10 ENCOUNTER — TELEPHONE (OUTPATIENT)
Dept: DERMATOLOGY | Facility: CLINIC | Age: 72
End: 2022-08-10

## 2022-08-22 ENCOUNTER — TELEPHONE (OUTPATIENT)
Dept: CARDIOLOGY | Facility: CLINIC | Age: 72
End: 2022-08-22
Payer: COMMERCIAL

## 2022-08-22 PROCEDURE — 99207 PR NO CHARGE NURSE ONLY: CPT

## 2022-08-22 NOTE — TELEPHONE ENCOUNTER
Participant stated not receiving payment via check. She saw that it was supposed to be delivered, 8/15/2022, form her [employer] mail [employer]as[employer] but never received. Will follow-up and re-issue/cancel check.    23-Aug-2022: Update: Participant received check in mail. Participant able to cash check. Issue resolved.

## 2022-08-31 ENCOUNTER — OFFICE VISIT (OUTPATIENT)
Dept: DERMATOLOGY | Facility: CLINIC | Age: 72
End: 2022-08-31
Payer: COMMERCIAL

## 2022-08-31 DIAGNOSIS — D09.9 SQUAMOUS CELL CARCINOMA IN SITU (SCCIS): Primary | ICD-10-CM

## 2022-08-31 PROCEDURE — 17262 DSTRJ MAL LES T/A/L 1.1-2.0: CPT | Performed by: PHYSICIAN ASSISTANT

## 2022-08-31 NOTE — NURSING NOTE
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    There were no vitals taken for this visit.    Do you need any medication refills at today's visit? No  Dionna Ruiz, LISA on 8/31/2022 at 3:10 PM

## 2022-08-31 NOTE — PROGRESS NOTES
Dermatology Procedure Note: Electrodesiccation and Curettage    PREOPERATIVE DIAGNOSIS: SCCIS    POSTOPERATIVE DIAGNOSIS: same    LOCATION: left gluteal cleft     SIZE: 1.0 cm     Treatment options including electrodessiccation and curettage (ED and C), excision and topicals were reviewed.  The expected cure rates, healing times and anticipated scars of each option were discussed and the patient elects to proceed with ED and C.     The risks and benefits of the procedure were described to the patient.  These include but are not limited to bleeding, infection, scar, incomplete removal, and recurrence. Written informed consent was obtained. Time-out was performed. The above site was cleansed with and injected with 1 mL1% lidocaine with epinephrine. Once anesthesia was obtained, the site was prepped with Alcohol. The lesion was curetted with  in 3 directions with a 3 mm margin and this was followed by electrodessication.  This process was repeated three times. The defect measured 1.7 cm. Vaseline and a bandage were applied to the wound. The patient tolerated the procedure well and was given post care instructions.    Clinical Follow-up: 6 months       Staff Involved:  Scribe/Staff    Scribe Disclosure:   German FARAH, am serving as a scribe to document services personally performed by Amber Baumann PA-C, based on data collection and the provider's statements to me.  Provider Disclosure:   The documentation recorded by the scribe accurately reflects the services I personally performed and the decisions made by me.    All risks, benefits and alternatives were discussed with patient.  Patient is in agreement and understands the assessment and plan.  All questions were answered.    Amber Baumann PA-C, [facility]  [facility] [place]: Phone: [phone], Fax: [phone]  [facility]: Phone: [phone],  Fax: [phone]  [facility]  Kayy [place]: Phone: [phone], Fax: [phone]

## 2022-08-31 NOTE — PATIENT INSTRUCTIONS
Wound Care:  Electrodesiccation and Curettage     I will experience scar, altered skin color, bleeding, swelling, pain, crusting and redness. I may experience altered sensation. Risks are excessive bleeding, infection, muscle weakness, thick (hypertrophic or keloidal) scar, and recurrence,. A second procedure may be recommended to obtain the best cosmetic or functional result.    What is electrodesiccation and curettage ?  Scraping off tissue (curettage)  Destroy tissue using electric current or cautery (electrodessication)    How do I perform wound care?  Keep dressing in place for two days. You may shower with the dressing in place(do not get wet)  After 2 days, wash hands and remove dressing. Clean wound with cotton-swab soaked in hydrogen peroxide to remove drainage and crust  Put on a thick layer of Vaseline on the wound using a cotton-swab   Cover the wound with a Band-AidTM to protect from dust and tight clothing  If wound is draining before two days, change your dressing as described above sooner  During wound care, do not allow the area to dry out or form a scab    What do I need?  Hydrogen peroxide   Cotton-swabs   Vaseline or petroleum jelly   Band-AidsTM or dressing supplies as needed     When should I call the doctor?  [facility]: [phone]  [facility]: [phone]  For urgent needs outside of business hours call the [facility] at [phone] and ask for the dermatology resident on call

## 2022-08-31 NOTE — LETTER
8/31/2022         RE: Sienna Bennett  [address]  [place] [address]        Dear Colleague,    Thank you for referring your patient, Sienna Bennett, to the [facility]. Please see a copy of my visit note below.    Dermatology Procedure Note: Electrodesiccation and Curettage    PREOPERATIVE DIAGNOSIS: SCCIS    POSTOPERATIVE DIAGNOSIS: same    LOCATION: left gluteal cleft     SIZE: 1.0 cm     Treatment options including electrodessiccation and curettage (ED and C), excision and topicals were reviewed.  The expected cure rates, healing times and anticipated scars of each option were discussed and the patient elects to proceed with ED and C.     The risks and benefits of the procedure were described to the patient.  These include but are not limited to bleeding, infection, scar, incomplete removal, and recurrence. Written informed consent was obtained. Time-out was performed. The above site was cleansed with and injected with 1 mL1% lidocaine with epinephrine. Once anesthesia was obtained, the site was prepped with Alcohol. The lesion was curetted with  in 3 directions with a 3 mm margin and this was followed by electrodessication.  This process was repeated three times. The defect measured 1.7 cm. Vaseline and a bandage were applied to the wound. The patient tolerated the procedure well and was given post care instructions.    Clinical Follow-up: 6 months       Staff Involved:  Scribe/Staff    Scribe Disclosure:   German FARAH, am serving as a scribe to document services personally performed by Amber Baumann PA-C, based on data collection and the provider's statements to me.  Provider Disclosure:   The documentation recorded by the scribe accurately reflects the services I personally performed and the decisions made by me.    All risks, benefits and alternatives were discussed with patient.  Patient is in agreement and understands the assessment and plan.  All  questions were answered.    Amber Baumann PA-C, MPAS  [facility]: Phone: [phone], Fax: [phone]  [facility]: Phone: [phone],  Fax: [phone]  [facility]: Phone: [phone], Fax: [phone]              Again, thank you for allowing me to participate in the care of your patient.        Sincerely,        Amber Baumann PA-C

## 2022-09-01 NOTE — NURSING NOTE
The following medication was given:     MEDICATION:  Lidocaine with epinephrine 1% 1:[id number]  ROUTE: SQ  SITE: see procedure note  DOSE: 5 mL  LOT #: [id number]  : [employer]  EXPIRATION DATE: 01/01/2023  NDC#: 6588-6271-86  Was there drug waste? no  Multi-dose vial: Yes    Cami Dillard CMA  September 1, 2022

## 2022-09-03 ENCOUNTER — HEALTH MAINTENANCE LETTER (OUTPATIENT)
Age: 72
End: 2022-09-03

## 2022-09-07 LAB — NONINV COLON CA DNA+OCC BLD SCRN STL QL: NEGATIVE

## 2022-09-07 NOTE — RESULT ENCOUNTER NOTE
Your Cologuard test for colon cancer screening was negative. This is good! This will need to be repeated every three years.  Kind regards,  Kalli Valle MD

## 2023-01-12 ENCOUNTER — LAB (OUTPATIENT)
Dept: LAB | Facility: CLINIC | Age: 73
End: 2023-01-12
Payer: COMMERCIAL

## 2023-01-12 DIAGNOSIS — R94.6 THYROID FUNCTION TEST ABNORMAL: ICD-10-CM

## 2023-01-12 DIAGNOSIS — Z11.59 NEED FOR HEPATITIS C SCREENING TEST: ICD-10-CM

## 2023-01-12 DIAGNOSIS — R73.01 IMPAIRED FASTING GLUCOSE: ICD-10-CM

## 2023-01-12 DIAGNOSIS — D47.3 ESSENTIAL THROMBOCYTHEMIA (H): ICD-10-CM

## 2023-01-12 LAB
BASOPHILS # BLD AUTO: 0 10E3/UL (ref 0–0.2)
BASOPHILS NFR BLD AUTO: 0 %
EOSINOPHIL # BLD AUTO: 0.1 10E3/UL (ref 0–0.7)
EOSINOPHIL NFR BLD AUTO: 2 %
ERYTHROCYTE [DISTWIDTH] IN BLOOD BY AUTOMATED COUNT: 12.9 % (ref 10–15)
FASTING STATUS PATIENT QL REPORTED: NO
GLUCOSE BLD-MCNC: 103 MG/DL (ref 70–99)
HBA1C MFR BLD: 5.2 % (ref 0–5.6)
HCT VFR BLD AUTO: 41.2 % (ref 35–47)
HCV AB SERPL QL IA: NONREACTIVE
HGB BLD-MCNC: 13.6 G/DL (ref 11.7–15.7)
IMM GRANULOCYTES # BLD: 0 10E3/UL
IMM GRANULOCYTES NFR BLD: 0 %
LYMPHOCYTES # BLD AUTO: 0.8 10E3/UL (ref 0.8–5.3)
LYMPHOCYTES NFR BLD AUTO: 16 %
MCH RBC QN AUTO: 37.6 PG (ref 26.5–33)
MCHC RBC AUTO-ENTMCNC: 33 G/DL (ref 31.5–36.5)
MCV RBC AUTO: 114 FL (ref 78–100)
MONOCYTES # BLD AUTO: 0.4 10E3/UL (ref 0–1.3)
MONOCYTES NFR BLD AUTO: 8 %
NEUTROPHILS # BLD AUTO: 3.5 10E3/UL (ref 1.6–8.3)
NEUTROPHILS NFR BLD AUTO: 74 %
PLATELET # BLD AUTO: 399 10E3/UL (ref 150–450)
RBC # BLD AUTO: 3.62 10E6/UL (ref 3.8–5.2)
TSH SERPL DL<=0.005 MIU/L-ACNC: 3.2 MU/L (ref 0.4–4)
WBC # BLD AUTO: 4.8 10E3/UL (ref 4–11)

## 2023-01-12 PROCEDURE — 83036 HEMOGLOBIN GLYCOSYLATED A1C: CPT

## 2023-01-12 PROCEDURE — 86803 HEPATITIS C AB TEST: CPT

## 2023-01-12 PROCEDURE — 85025 COMPLETE CBC W/AUTO DIFF WBC: CPT

## 2023-01-12 PROCEDURE — 82947 ASSAY GLUCOSE BLOOD QUANT: CPT

## 2023-01-12 PROCEDURE — 84443 ASSAY THYROID STIM HORMONE: CPT

## 2023-01-12 PROCEDURE — 36415 COLL VENOUS BLD VENIPUNCTURE: CPT

## 2023-01-13 NOTE — RESULT ENCOUNTER NOTE
Kelsea,  - The hepatitis C screening test was negative.    -Your thyroid screening test is back into the normal range.  We will continue to monitor this yearly.  - The fasting blood glucose remains elevated the pre-diabetic range (100-125).  Thankfully, the A1c test which looks at blood glucose average is over 3 months was still in the normal range.  Lifestyle measures will help to lower your blood glucose levels and lower the risks of diabetes. These measures include regular exercise, weight loss/maintaining a healthy body weight, and moderating/decreasing carbohydrates (sugar, bread, pasta, rice, baked goods, potatoes, etc) in your diet. We will continue to monitor your blood glucose annually, but please be seen sooner  if you develop any new signs or symptoms of diabetes (increase thirst or urination, fatigue, blurry vision, unexplained weight loss).    Please MyChart or call if you have any concerns or questions.   Sincerely,  Kalli Valle MD

## 2023-01-17 NOTE — PROGRESS NOTES
[facility] Medical Oncology Note       Date of visit: January 19, 2023  New Outpatient Clinic Note        Assessment:     1. Essential thrombocythemia, approavching 22 years from the time of diagnosis, with stable platelet count on current dosing of hydroxyurea, 1000 mg p.o. Saturdays and Sundays, with 500 mg p.o. daily the rest of the week.   The chance of this becoming a major issue for her is extraordinarily low, though not zero.  There is a one in 20 chance of progressing to either myelofibrosis or an acute leukemia.  This is why I have continued  to see Kelsea and its been a pleasure to do so.  2. As has always been my concern, the major healthcare issue is COPD.  She smoked for many years, but has quit now for the last four.  Nonetheless I always felt she would benefit  by seeing a primary care physician who could have her on some inhaled steroids. Happily, she is now following with a wonderful PMD, Dr. Fitzpatrick. She has seen a pulmonologist and is on inhaled steroids.  She has seen a cardiologist.  And, most importantly, she is feeling better.  3. Multiple other psychosocial stressors discussed at length. Her 94 year old mother, who lives with matilda and Jason, has progressive dementia, and it certainly makes sense to get her into a memory care facility.        Plan:     1. Continue current dosing of hydroxyurea  2. Continue current dosing of aspirin  3. Continue follow-up with Dr. Valle  4. Return to clinic with me in 6 months with a CBC as we continue in follow-up mode. Per Kelsea's request, we can continue with virtual visits.          Sanjay Lambert MD, MSc  Associate Professor of Medicine  [employer]  [facility]  [address] [place] [address]  [phone]    __________________________________________________________________    CHIEF COMPLAINT     1. Essential thrombocythemia, diagnosed by bone marrow biopsy on 04/10/2001.  Kelsea has low  risk features but has a significantly increased risk of thrombosis because of her long-term history of smoking.  She has quit for the last 4-5 years.  2. I have known Kelsea for 20 years and she had always refused to see a primary care provider, even though it was my assumption that she has COPD based on exam findings.  She may also have hypertension and hyperlipidemia, things I do not manage.  She finally established care with Dr. Kalli Valle last year.    History of Present Illness:       Hydroxyurea at varying doses.  She has been on this for 20 years.  Her current dose is 1000 mg p.o. daily on Saturdays and Sundays, with 500 mg daily the rest of the week    She also remains on a daily aspirin.      Interval history:       Kelsea is back for her visit    She is feeling about the same.    Her demented mother is a handful and she and Jason are considering an assisted living facility with memory care. They took her on a cruise and it was very tough. Kelsea's brothers have no intention on helping her out with eldercare.    Physically doing pretty well.    Denies current fevers or adenopathy.  There have been no recent infections.    She continues to get weaker.  She does not have much of an appetite.          Past Medical History:   I have reviewed this patient's past medical history   Past Medical History:   Diagnosis Date     COPD (chronic obstructive pulmonary disease) (H) 2014     Essential thrombocythemia (H) 7/20/2022     Heart disease 2022          Past Surgical History:    I have reviewed this patient's past surgical history       Social History:   Tobacco, ETOH, and rec drugs reviewed and as noted below with the following exceptions:  Kelsea is  to Jason who is 15 years her senior, but has no biologic children.  She is originally from [place].  She has been traveling back and forth to [place] because her 93-year-old mother, who most recently lived with her and her  Jason, wanted to see family.  Kelsea's  brothers think of her as a Saint.  She would like them to share some of the burden of their mother's care.            Family History:     Family History   Problem Relation Age of Onset     Hypertension Mother      Hyperlipidemia Mother      Cerebrovascular Disease Father         At age 86     Prostate Cancer Father      Hypertension Brother      Other Cancer Brother         Bladder, Lung            Medications:     Current Outpatient Medications   Medication Sig Dispense Refill     aspirin 81 MG chewable tablet Take 81 mg by mouth daily.       Cholecalciferol (VITAMIN D PO) Take  by mouth.       fluticasone (FLONASE) 50 MCG/ACT nasal spray Spray 2 sprays into both nostrils daily (Patient taking differently: Spray 2 sprays into both nostrils daily As needed.) 48 g 3     Fluticasone-Umeclidin-Vilanterol (TRELEGY ELLIPTA) 200-62.5-25 MCG/INH oral inhaler Inhale 1 puff into the lungs daily 60 each 11     hydroxyurea (HYDREA) 500 MG capsule Take 1 capsule (500 mg) by mouth daily 5 days/week with 1000 mg the other 2 days 75 capsule 6     levalbuterol (XOPENEX HFA) 45 MCG/ACT inhaler Inhale 2 puffs into the lungs every 4 hours as needed for shortness of breath / dyspnea or wheezing 15 g 1     loratadine (CLARITIN) 10 MG tablet Take 10 mg by mouth daily       rosuvastatin (CRESTOR) 20 MG tablet Take 1 tablet (20 mg) by mouth daily 90 tablet 3              Physical Exam:   There were no vitals taken for this visit.    ECOG PS: 0  Constitutional: WDWN female in NAD, pleasant and appropriate  Neurologic: alert, answering questions appropriately, moving all extremities spontaneously. CN 2-12 grossly intact.  Psych: appropriate affect  No further exam could be done because this was a virtual visit  Data:      Latest Reference Range & Units 07/18/22 11:27 01/12/23 10:18   WBC 4.0 - 11.0 10e3/uL 5.2 4.8   Hemoglobin 11.7 - 15.7 g/dL 13.9 13.6   Hematocrit 35.0 - 47.0 % 40.2 41.2   Platelet Count 150 - 450 10e3/uL 490 (H) 399   RBC  Count 3.80 - 5.20 10e6/uL 3.65 (L) 3.62 (L)   MCV 78 - 100 fL 110 (H) 114 (H)       No results found for this or any previous visit (from the past 24 hour(s)).    Other Data           Labs, imaging and treatment plan reviewed with patient. All questions answered.        25 minutes spent on the date of the encounter doing chart review, review of outside records, review of test results, interpretation of tests, patient visit and documentation

## 2023-01-19 ENCOUNTER — VIRTUAL VISIT (OUTPATIENT)
Dept: ONCOLOGY | Facility: CLINIC | Age: 73
End: 2023-01-19
Attending: INTERNAL MEDICINE
Payer: COMMERCIAL

## 2023-01-19 DIAGNOSIS — D47.3 ESSENTIAL THROMBOCYTHEMIA (H): ICD-10-CM

## 2023-01-19 PROCEDURE — G0463 HOSPITAL OUTPT CLINIC VISIT: HCPCS | Mod: PN,GT | Performed by: INTERNAL MEDICINE

## 2023-01-19 PROCEDURE — 99213 OFFICE O/P EST LOW 20 MIN: CPT | Mod: 95 | Performed by: INTERNAL MEDICINE

## 2023-01-19 RX ORDER — HYDROXYUREA 500 MG/1
500 CAPSULE ORAL DAILY
Qty: 225 CAPSULE | Refills: 4 | Status: SHIPPED | OUTPATIENT
Start: 2023-01-19 | End: 2024-03-06

## 2023-01-19 NOTE — LETTER
Date:January 19, 2023      Provider requested that no letter be sent. Do not send.       [facility]

## 2023-01-19 NOTE — LETTER
1/19/2023         RE: Sienna Bennett  [address]  [place] [address]        Dear Colleague,    Thank you for referring your patient, Sienna Bennett, to the [facility]. Please see a copy of my visit note below.          [facility] Medical Oncology Note       Date of visit: January 19, 2023  New Outpatient Clinic Note        Assessment:     1. Essential thrombocythemia, approavching 22 years from the time of diagnosis, with stable platelet count on current dosing of hydroxyurea, 1000 mg p.o. Saturdays and Sundays, with 500 mg p.o. daily the rest of the week.   The chance of this becoming a major issue for her is extraordinarily low, though not zero.  There is a one in 20 chance of progressing to either myelofibrosis or an acute leukemia.  This is why I have continued  to see Kelsea and its been a pleasure to do so.  2. As has always been my concern, the major healthcare issue is COPD.  She smoked for many years, but has quit now for the last four.  Nonetheless I always felt she would benefit  by seeing a primary care physician who could have her on some inhaled steroids. Happily, she is now following with a wonderful PMD, Dr. Fitzpatrick. She has seen a pulmonologist and is on inhaled steroids.  She has seen a cardiologist.  And, most importantly, she is feeling better.  3. Multiple other psychosocial stressors discussed at length. Her 94 year old mother, who lives with she and Jason, has progressive dementia, and it certainly makes sense to get her into a memory care facility.        Plan:     1. Continue current dosing of hydroxyurea  2. Continue current dosing of aspirin  3. Continue follow-up with Dr. Valle  4. Return to clinic with me in 6 months with a CBC as we continue in follow-up mode. Per Kelsea's request, we can continue with virtual visits.          Sanjay Lambert MD, MSc  Associate Professor of Medicine  [facility]  [facility]  [address] [place] [address]  [phone]    __________________________________________________________________    CHIEF COMPLAINT     1. Essential thrombocythemia, diagnosed by bone marrow biopsy on 04/10/2001.  Kelsea has low risk features but has a significantly increased risk of thrombosis because of her long-term history of smoking.  She has quit for the last 4-5 years.  2. I have known Kelsea for 20 years and she had always refused to see a primary care provider, even though it was my assumption that she has COPD based on exam findings.  She may also have hypertension and hyperlipidemia, things I do not manage.  She finally established care with Dr. Kalli Valle last year.    History of Present Illness:       Hydroxyurea at varying doses.  She has been on this for 20 years.  Her current dose is 1000 mg p.o. daily on Saturdays and Sundays, with 500 mg daily the rest of the week    She also remains on a daily aspirin.      Interval history:       Kelsea is back for her visit    She is feeling about the same.    Her demented mother is a handful and she and Jason are considering an assisted living facility with memory care. They took her on a cruise and it was very tough. Kelsea's brothers have no intention on helping her out with eldercare.    Physically doing pretty well.    Denies current fevers or adenopathy.  There have been no recent infections.    She continues to get weaker.  She does not have much of an appetite.          Past Medical History:   I have reviewed this patient's past medical history   Past Medical History:   Diagnosis Date     COPD (chronic obstructive pulmonary disease) (H) 2014     Essential thrombocythemia (H) 7/20/2022     Heart disease 2022          Past Surgical History:    I have reviewed this patient's past surgical history       Social History:   Tobacco, ETOH, and rec drugs reviewed and as noted below with the following exceptions:  Kelsea is   to Jason who is 15 years her senior, but has no biologic children.  She is originally from [place].  She has been traveling back and forth to [place] because her 93-year-old mother, who most recently lived with her and her  Jason, wanted to see family.  Kelsea's brothers think of her as a Saint.  She would like them to share some of the burden of their mother's care.            Family History:     Family History   Problem Relation Age of Onset     Hypertension Mother      Hyperlipidemia Mother      Cerebrovascular Disease Father         At age 86     Prostate Cancer Father      Hypertension Brother      Other Cancer Brother         Bladder, Lung            Medications:     Current Outpatient Medications   Medication Sig Dispense Refill     aspirin 81 MG chewable tablet Take 81 mg by mouth daily.       Cholecalciferol (VITAMIN D PO) Take  by mouth.       fluticasone (FLONASE) 50 MCG/ACT nasal spray Spray 2 sprays into both nostrils daily (Patient taking differently: Spray 2 sprays into both nostrils daily As needed.) 48 g 3     Fluticasone-Umeclidin-Vilanterol (TRELEGY ELLIPTA) 200-62.5-25 MCG/INH oral inhaler Inhale 1 puff into the lungs daily 60 each 11     hydroxyurea (HYDREA) 500 MG capsule Take 1 capsule (500 mg) by mouth daily 5 days/week with 1000 mg the other 2 days 75 capsule 6     levalbuterol (XOPENEX HFA) 45 MCG/ACT inhaler Inhale 2 puffs into the lungs every 4 hours as needed for shortness of breath / dyspnea or wheezing 15 g 1     loratadine (CLARITIN) 10 MG tablet Take 10 mg by mouth daily       rosuvastatin (CRESTOR) 20 MG tablet Take 1 tablet (20 mg) by mouth daily 90 tablet 3              Physical Exam:   There were no vitals taken for this visit.    ECOG PS: 0  Constitutional: WDWN female in NAD, pleasant and appropriate  Neurologic: alert, answering questions appropriately, moving all extremities spontaneously. CN 2-12 grossly intact.  Psych: appropriate affect  No further exam could be  done because this was a virtual visit  Data:      Latest Reference Range & Units 07/18/22 11:27 01/12/23 10:18   WBC 4.0 - 11.0 10e3/uL 5.2 4.8   Hemoglobin 11.7 - 15.7 g/dL 13.9 13.6   Hematocrit 35.0 - 47.0 % 40.2 41.2   Platelet Count 150 - 450 10e3/uL 490 (H) 399   RBC Count 3.80 - 5.20 10e6/uL 3.65 (L) 3.62 (L)   MCV 78 - 100 fL 110 (H) 114 (H)       No results found for this or any previous visit (from the past 24 hour(s)).    Other Data           Labs, imaging and treatment plan reviewed with patient. All questions answered.        25 minutes spent on the date of the encounter doing chart review, review of outside records, review of test results, interpretation of tests, patient visit and documentation         Kelsea is a 72 year old who is being evaluated via a billable video visit.      How would you like to obtain your AVS? MyChart  If the video visit is dropped, the invitation should be resent by: Send to e-mail at: [email or web address]  Will anyone else be joining your video visit? Karen LANGE [facility]    Video-Visit Details    Type of service:  Video Visit     Originating Location (pt. Location): Home    Distant Location (provider location):  On-site  Platform used for Video Visit: Shantanu      Again, thank you for allowing me to participate in the care of your patient.        Sincerely,        Sanjay Lambert MD

## 2023-01-19 NOTE — PROGRESS NOTES
Kelsea is a 72 year old who is being evaluated via a billable video visit.      How would you like to obtain your AVS? MyChart  If the video visit is dropped, the invitation should be resent by: Send to e-mail at: [email or web address]  Will anyone else be joining your video visit? Karen DIAMOND    Video-Visit Details    Type of service:  Video Visit     Originating Location (pt. Location): Home    Distant Location (provider location):  On-site  Platform used for Video Visit: [employer]

## 2023-02-15 ENCOUNTER — OFFICE VISIT (OUTPATIENT)
Dept: NURSING | Facility: CLINIC | Age: 73
End: 2023-02-15
Payer: COMMERCIAL

## 2023-02-15 VITALS — BODY MASS INDEX: 21.17 KG/M2 | WEIGHT: 119.5 LBS

## 2023-02-15 DIAGNOSIS — J43.2 CENTRILOBULAR EMPHYSEMA (H): ICD-10-CM

## 2023-02-15 LAB
6 MIN WALK (FT): 1450 FT
6 MIN WALK (M): 442 M

## 2023-02-15 PROCEDURE — 94729 DIFFUSING CAPACITY: CPT | Performed by: INTERNAL MEDICINE

## 2023-02-15 PROCEDURE — 94618 PULMONARY STRESS TESTING: CPT | Performed by: INTERNAL MEDICINE

## 2023-02-15 PROCEDURE — 94375 RESPIRATORY FLOW VOLUME LOOP: CPT | Performed by: INTERNAL MEDICINE

## 2023-02-17 LAB
DLCOUNC-%PRED-PRE: 65 %
DLCOUNC-PRE: 12.02 ML/MIN/MMHG
DLCOUNC-PRED: 18.33 ML/MIN/MMHG
ERV-%PRED-PRE: 87 %
ERV-PRE: 0.72 L
ERV-PRED: 0.81 L
EXPTIME-PRE: 10.15 SEC
FEF2575-%PRED-PRE: 17 %
FEF2575-PRE: 0.32 L/SEC
FEF2575-PRED: 1.8 L/SEC
FEFMAX-%PRED-PRE: 61 %
FEFMAX-PRE: 3.32 L/SEC
FEFMAX-PRED: 5.38 L/SEC
FEV1-%PRED-PRE: 44 %
FEV1-PRE: 0.92 L
FEV1FEV6-PRE: 46 %
FEV1FEV6-PRED: 79 %
FEV1FVC-PRE: 40 %
FEV1FVC-PRED: 78 %
FEV1SVC-PRE: 35 %
FEV1SVC-PRED: 73 %
FIFMAX-PRE: 2.97 L/SEC
FVC-%PRED-PRE: 86 %
FVC-PRE: 2.31 L
FVC-PRED: 2.68 L
IC-%PRED-PRE: 92 %
IC-PRE: 1.87 L
IC-PRED: 2.02 L
VA-%PRED-PRE: 93 %
VA-PRE: 4.09 L
VC-%PRED-PRE: 91 %
VC-PRE: 2.59 L
VC-PRED: 2.84 L

## 2023-02-20 ENCOUNTER — OFFICE VISIT (OUTPATIENT)
Dept: PULMONOLOGY | Facility: CLINIC | Age: 73
End: 2023-02-20
Payer: COMMERCIAL

## 2023-02-20 VITALS
HEART RATE: 121 BPM | RESPIRATION RATE: 16 BRPM | SYSTOLIC BLOOD PRESSURE: 108 MMHG | OXYGEN SATURATION: 95 % | DIASTOLIC BLOOD PRESSURE: 63 MMHG | BODY MASS INDEX: 21.09 KG/M2 | HEIGHT: 63 IN | WEIGHT: 119 LBS

## 2023-02-20 DIAGNOSIS — J43.2 CENTRILOBULAR EMPHYSEMA (H): ICD-10-CM

## 2023-02-20 PROCEDURE — 99215 OFFICE O/P EST HI 40 MIN: CPT | Performed by: INTERNAL MEDICINE

## 2023-02-20 RX ORDER — GUAIFENESIN AND DEXTROMETHORPHAN HYDROBROMIDE 600; 30 MG/1; MG/1
1 TABLET, EXTENDED RELEASE ORAL EVERY 12 HOURS
COMMUNITY
End: 2023-06-02

## 2023-02-20 ASSESSMENT — PAIN SCALES - GENERAL: PAINLEVEL: NO PAIN (0)

## 2023-02-20 NOTE — RESULT ENCOUNTER NOTE
Results discussed directly with patient while patient was present. Any further details documented in the note.   Gilda Walker MD

## 2023-02-20 NOTE — PROGRESS NOTES
"Sienna Bennett's goals for this visit include: Return  She requests these members of her care team be copied on today's visit information: PCP    PCP: No Ref-Primary, Physician    Referring Provider:  No referring provider defined for this encounter.    /63   Pulse (!) 121   Resp 16   Ht 1.6 m (5' 2.99\")   Wt 54 kg (119 lb)   SpO2 95%   BMI 21.09 kg/m      Do you need any medication refills at today's visit? Y    Shima Jorge LPN  Pulmonary Medicine:  [facility]  Phone: [phone] Fax: [phone]      "

## 2023-02-20 NOTE — PROGRESS NOTES
Pulmonary Clinic Return Patient Visit  Reason for Visit: COPD  History of Present Illness  Vincenzo Bennett is a 72-year-old female who presents to clinic for follow up of COPD. I last saw her in 7/2022.  To briefly review, Kelsea was diagnosed with COPD over the last several years based on symptoms alone.  Her symptoms include daily coughing which is productive of tenacious, occasionally copious yellowish sputum, usually worse in the morning with associated dyspnea on exertion.  She also has underlying seasonal allergies with rhinitis and postnasal drip. She continued to be very symptomatic while on previously prescribed Incruse and I escalated her regimen to Trelegy with very good results.  She has done fairly well on her escalated regimen of Trelegy and there has been no AECOPDs since the last clinic visit and she appears to have better airway clearance since adding guaifenesin/Mucinex for better airway clearance. She is less SOB and has exertional dyspnea. Rescue inhalers have been helpful and she uses it seldomly. Already enrolled in lung cancer screening.  She denies any chest pains, no orthopnea, no PND and no pedal swellings.  No dysphonia and no dysphagia.  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 Brother was diagnosed with lung cancer and he was a smoker. No exposure to asbestos, dust or toxic fumes.    Review of Systems:  10 of 14 systems reviewed and are negative unless otherwise stated in HPI.    Past Medical History:   Diagnosis Date     COPD (chronic obstructive pulmonary disease) (H) 2014     Essential thrombocythemia (H) 7/20/2022     Heart disease 2022       Past Surgical History:   Procedure Laterality Date     BIOPSY Left ?    breast bx from calcifications, benign     ORTHOPEDIC SURGERY  1967    Tendon repair Hand     THYROID SURGERY      left part of thyroid gland- age  22       Family History   Problem Relation Age of Onset     Hypertension Mother      Hyperlipidemia Mother      Cerebrovascular Disease Father         At age 86     Prostate Cancer Father      Hypertension Brother      Other Cancer Brother         Bladder, Lung       Social History     Socioeconomic History     Marital status:    Tobacco Use     Smoking status: Former Smoker     Packs/day: 1.00     Years: 46.00     Pack years: 46.00     Types: Cigarettes     Start date: 1968     Quit date: 2014     Years since quittin.5     Smokeless tobacco: Never Used     Tobacco comment: Quit in    Vaping Use     Vaping Use: Never used   Substance and Sexual Activity     Alcohol use: Yes     Comment: rare- maybe once a month     Drug use: No     Sexual activity: Not Currently     Birth control/protection: Post-menopausal   Other Topics Concern     Parent/sibling w/ CABG, MI or angioplasty before 65F 55M? No         Allergies   Allergen Reactions     Wellbutrin [Bupropion]          Current Outpatient Medications:      aspirin 81 MG chewable tablet, Take 81 mg by mouth daily., Disp: , Rfl:      Cholecalciferol (VITAMIN D PO), Take  by mouth., Disp: , Rfl:      dextromethorphan-guaiFENesin (MUCINEX DM)  MG 12 hr tablet, Take 1 tablet by mouth every 12 hours, Disp: , Rfl:      fluticasone (FLONASE) 50 MCG/ACT nasal spray, Spray 2 sprays into both nostrils daily (Patient taking differently: Spray 2 sprays into both nostrils daily As needed.), Disp: 48 g, Rfl: 3     Fluticasone-Umeclidin-Vilanterol (TRELEGY ELLIPTA) 200-62.5-25 MCG/INH oral inhaler, Inhale 1 puff into the lungs daily, Disp: 60 each, Rfl: 11     hydroxyurea (HYDREA) 500 MG capsule, Take 1 capsule (500 mg) by mouth daily 5 days/week with 1000 mg the other 2 days, Disp: 225 capsule, Rfl: 4     levalbuterol (XOPENEX HFA) 45 MCG/ACT inhaler, Inhale 2 puffs into the lungs every 4 hours as needed for shortness of breath / dyspnea or wheezing,  "Disp: 15 g, Rfl: 1     loratadine (CLARITIN) 10 MG tablet, Take 10 mg by mouth daily, Disp: , Rfl:      rosuvastatin (CRESTOR) 20 MG tablet, Take 1 tablet (20 mg) by mouth daily, Disp: 90 tablet, Rfl: 3      Physical Exam:  /63   Pulse (!) 121   Resp 16   Ht 1.6 m (5' 2.99\")   Wt 54 kg (119 lb)   SpO2 95%   BMI 21.09 kg/m    GENERAL: Well developed, well nourished, alert, and in no apparent distress.  HEENT: Normocephalic, atraumatic. PERRL, EOMI. Oral mucosa is moist. No perioral cyanosis.  NECK: supple, no masses, no thyromegaly.  RESP:  Normal respiratory effort.  Markedly reduced breath sounds.  No rales, wheezes, rhonchi.  No cyanosis or clubbing.  CV: Normal S1, S2, regular rhythm, normal rate. No murmur.  No LE edema.   ABDOMEN:  Soft, non-tender, non-distended.   SKIN: warm and dry. No rash.  NEURO: AAOx3.  Normal gait.  Fluent speech.  PSYCH: mentation appears normal.   Results:  PFTs: Reviewed and discussed with patient-moderate obstruction with diffusion impairment.  Air trapping and hyperinflation  Most Recent [place] Pulmonary Function Testing    FVC-Pred   Date Value Ref Range Status   02/15/2023 2.68 L      FVC-Pre   Date Value Ref Range Status   02/15/2023 2.31 L      FVC-%Pred-Pre   Date Value Ref Range Status   02/15/2023 86 %      FEV1-Pre   Date Value Ref Range Status   02/15/2023 0.92 L      FEV1-%Pred-Pre   Date Value Ref Range Status   02/15/2023 44 %      FEV1FVC-Pred   Date Value Ref Range Status   02/15/2023 78 %      FEV1FVC-Pre   Date Value Ref Range Status   02/15/2023 40 %      No results found for: 20029  FEFMax-Pred   Date Value Ref Range Status   02/15/2023 5.38 L/sec      FEFMax-Pre   Date Value Ref Range Status   02/15/2023 3.32 L/sec      FEFMax-%Pred-Pre   Date Value Ref Range Status   02/15/2023 61 %      ExpTime-Pre   Date Value Ref Range Status   02/15/2023 10.15 sec      FIFMax-Pre   Date Value Ref Range Status   02/15/2023 2.97 L/sec      FEV1FEV6-Pred   Date " Value Ref Range Status   02/15/2023 79 %      FEV1FEV6-Pre   Date Value Ref Range Status   02/15/2023 46 %      No results found for: 20055  Imaging (personally reviewed in clinic today): CT Chest Lung Ca  Impression:   1. ACR Assessment Category (v1.1):  Lung-RADS Category 2. Benign  appearance or behavior.  Recommendation:  Lung-RADS Category 2. Benign appearance or behavior.  Recommendation:  continue annual screening with Lung cancer screening CT (please order exam code [id number]).    2. Significant Incidental Finding(s):  Category S: Yes.  a.  coronary artery calcium moderate or severe . Aortic valve calcification moderate. Cardiology consult may be helpful  b.  Moderate apical predominant centrilobular emphysematous changes.    c.  Age-indeterminate mild anterior wedge compression deformity of T11 with superimposed Schmorl's node deformity.     3. Scattered distal mucous plugging and scarring suggestion bronchiolitis.   4. Additional incidental findings including calcifications at the left breast skin surface for which mammographic correlation is recommended      Assessment and Plan:   COPD (Group D)   Significant obstruction on PFTs with emphysematous changes on radiologic imaging.  She does have a CAT score of 21 significantly improved from before. She has done fairly well on her escalated regimen of Trelegy and there has been no AECOPDs since the last clinic visit and she appears to have better airway clearance since adding guaifenesin/Mucinex for better airway clearance and prescriptions were given with refills.  She is fairly active and I will hold off on referring her to pulmonary rehab at this time  History of Smoking/Pulmonary Nodules   Enrolled in lung cancer screening and next CT Chest in 4/2023      Questions and concerns were answered to the patient's satisfaction.  she was provided with my contact information should new questions or concerns arise in the interim.  she should return to clinic in 12  months  Up to date on vaccination  I spent a total of 40 minutes face to face with Sienna Bennett during today's office visit excluding time spent for lung cancer screening. Over 50% of this time was spent counseling the patient and/or coordinating care regarding their pulmonary disease.    Gilda Walker MD  Pulmonary, Critical Care and Sleep Medicine  [facility]  Pager: [phone]        The above note was dictated using voice recognition software and may include typographical errors. Please contact the author for any clarifications.

## 2023-03-23 DIAGNOSIS — J44.9 CHRONIC OBSTRUCTIVE PULMONARY DISEASE, UNSPECIFIED COPD TYPE (H): ICD-10-CM

## 2023-03-24 RX ORDER — LEVALBUTEROL TARTRATE 45 UG/1
2 AEROSOL, METERED ORAL EVERY 4 HOURS PRN
Qty: 15 G | Refills: 1 | Status: SHIPPED | OUTPATIENT
Start: 2023-03-24 | End: 2023-12-20

## 2023-04-03 ENCOUNTER — ANCILLARY PROCEDURE (OUTPATIENT)
Dept: CT IMAGING | Facility: CLINIC | Age: 73
End: 2023-04-03
Attending: INTERNAL MEDICINE
Payer: COMMERCIAL

## 2023-04-03 ENCOUNTER — ANCILLARY PROCEDURE (OUTPATIENT)
Dept: MAMMOGRAPHY | Facility: CLINIC | Age: 73
End: 2023-04-03
Payer: COMMERCIAL

## 2023-04-03 ENCOUNTER — TELEPHONE (OUTPATIENT)
Dept: PULMONOLOGY | Facility: CLINIC | Age: 73
End: 2023-04-03

## 2023-04-03 DIAGNOSIS — Z12.31 VISIT FOR SCREENING MAMMOGRAM: ICD-10-CM

## 2023-04-03 DIAGNOSIS — Z87.891 HISTORY OF SMOKING: ICD-10-CM

## 2023-04-03 PROCEDURE — 71271 CT THORAX LUNG CANCER SCR C-: CPT | Performed by: RADIOLOGY

## 2023-04-03 PROCEDURE — 77067 SCR MAMMO BI INCL CAD: CPT | Mod: GC | Performed by: STUDENT IN AN ORGANIZED HEALTH CARE EDUCATION/TRAINING PROGRAM

## 2023-04-03 PROCEDURE — 77063 BREAST TOMOSYNTHESIS BI: CPT | Mod: GC | Performed by: STUDENT IN AN ORGANIZED HEALTH CARE EDUCATION/TRAINING PROGRAM

## 2023-04-03 NOTE — TELEPHONE ENCOUNTER
Received call from [employer] [facility] reporting incidental finding on CT chest lung cancer screening. Patient completed CT chest lung cancer screening this afternoon and there was an incidental finding reported. Per radiology:    2. Significant Incidental Finding(s):  Category S: Yes.  a.  Severe coronary artery calcium   b. Aortic valve leaflet calcification.     Message has been sent to patient's cardiology team for review/follow up.       Shima Jorge LPN  Pulmonary Medicine:  [facility]  Phone: [phone] Fax: [phone]

## 2023-04-05 DIAGNOSIS — R91.8 PULMONARY NODULES: Primary | ICD-10-CM

## 2023-04-06 ENCOUNTER — TELEPHONE (OUTPATIENT)
Dept: PULMONOLOGY | Facility: CLINIC | Age: 73
End: 2023-04-06

## 2023-04-06 DIAGNOSIS — R91.8 LUNG NODULES: ICD-10-CM

## 2023-04-06 NOTE — TELEPHONE ENCOUNTER
"[facility] Radiology Lung Cancer Screening CT result notification:     LDCT/Lung Cancer Screening CT Exam date: 4/3/23  Radiologist Impression AND Recommendations:   1. ACR Assessment Category (2022):  Lung-RADS Category 4A. Suspicious.   New 6 x 7 mm solid nodule in the left lung apex.     Recommendation:  Lung-RADS Category 4A. Suspicious. 3 month LDCT  (please order IMG 2413); PET/CT may be used when there is a ? 8 mm  (?268.1 mm3) solid component.     Pertinent Findings:  Nodules:    - 6 x 7  mm solid nodule in the left upper lobe (series 4 image 38),  new since prior.   - Cluster of groundglass/micronodularity in the left upper lobe  (series 4 images 52-57), also new since prior.   - A few other scattered tiny pulmonary nodules are unchanged.     Ordering Provider: Gilda Walker MD  Sienna Bennett did receive the remaining radiology results from their PCP.   Message to Patient from PCP, Gilda Walker MD, regarding chest CT results (copied and pasted):     Lung Nodule Program Protocol recommendation [Pertaining to lung nodules]:    Lung RADS 4A Protocol:  Results RN to notify Patient of results/recommendations and offer a referral to [facility] Lung Nodule Clinic within 4 to 6 weeks  o Offer referral to [facility] Lung Nodule clinic instead of the 3 month CT. (Yes/No/NA):  Yes  o If Patient agrees to a referral to [facility] Lung Nodule Clinic [9023 - Adult Oncology/Hematology  referral with reason for referral \"Interventional pulmonology (Lung Nodule)\"], place referral to [facility] Lung Nodule Clinic to be seen within 4 to 6 weeks.  (Yes/No/NA):  NA  o If Patient refuses referral, place order for 3 month CT ([id number]).   (Yes/No/NA):  NA;  Dr Walker has placed chest CT order to be completed in 3 months  o Relay information to Patient:  [facility] Scheduling team, [phone], will be calling Patient within 1 week to schedule appt - appt only M-F. (Yes/No/NA): NA  o Results RN " routed telephone encounter as FYI to CNS team (Yes/No): Yes    RN communicated the lung nodule finding to the patient (Yes/No): Yes  The patient had the following questions: No questions  Correct letter sent as per [facility] Lung nodule protocol (Yes/No):  Yes  Did Patient have any CT's of chest previous? (Yes/No/NA) : Yes 3/17/2022  If no comparisons used, inquire if patient has had any chest CT's in the past and if so, request priors.    If scheduling LDCT only, RN will contact Image Scheduling Team  Hours available (all sites below):  Mon-Fri 7A to 7P; Sat 7A to 3:30P.  No schedulers available on Sunday.    [facility] ([facility]): [phone]    North region ([facility] [place]): [phone]    South region ([place] and [place]): [phone]    East region ([facility]): [phone]    Lung Nodule Clinics    Pulomonary (Lung Care) Clinic at [facility]  Floor 2, Check-In D, [address] , [place], [place]  Hours: Mon - Fri 7:00 AM to 5:00 PM    [facility]   Floor 2, [address], [place]  [phone]  Hours: Mon - Fri 7:00 AM - 7:00 PM;  Sat 8:00 AM to 12:00 PM (noon)    [facility] at [facility]  [address] [place], [place], [phone]  Hours: Mon - Fri 8:00 AM - 4:30 PM    [facility]  [address], [place] [address]  Hours: Mon-Thurs 7:00 AM- 6:30 PM;  Friday 7:00 AM- 5:30 PM    [facility] [place]  [address] [place] [address] [phone]  Hours: Mon -Thurs 7:00 AM-7:00 PM;  Friday 12:00 PM (noon) - 4 PM      Taye Holman RN  [facility]  Lung Nodule and Emergency Dept Lab Result RN  Ph# [phone]

## 2023-05-19 ENCOUNTER — NURSE TRIAGE (OUTPATIENT)
Dept: FAMILY MEDICINE | Facility: CLINIC | Age: 73
End: 2023-05-19

## 2023-05-19 NOTE — TELEPHONE ENCOUNTER
Patient recently traveled to [place] for 3 weeks. Patient [place] to [place] today and just got home. About 10 days into her trip, she developed a runny nose and cough. She took a Covid test while in [place] and it was negative. She is taking Mucinex for the cough. She denies fever, trouble breathing, shortness of breath, chest pain, and her I-watch has tracked her SpO2 at 96%. She reports the cough is quite severe with lots of productive green phlegm, denies coughing blood, no wheezing. She also states the coughing has thrown out and hurt her back. Per protocol, she does have a history of COPD, see in office today. No available appointments today. RN recommended urgent care, and patient verbalized request for virtual option as she is very exhausted from travel. Virtual urgent care appointment made for 4:30 pm. She verbalized understanding of joining on [employer] and states no further questions at this time.     PHYLLIS HannaN, RN, PHN  [facility]  [place], [place], [place]       Reason for Disposition    Known COPD or other severe lung disease (i.e., bronchiectasis, cystic fibrosis, lung surgery) and worsening symptoms (i.e., increased sputum purulence or amount, increased breathing difficulty)    Additional Information    Negative: Bluish (or gray) lips or face    Negative: SEVERE difficulty breathing (e.g., struggling for each breath, speaks in single words)    Negative: Rapid onset of cough and has hives    Negative: Coughing started suddenly after medicine, an allergic food or bee sting    Negative: Difficulty breathing after exposure to flames, smoke, or fumes    Negative: Sounds like a life-threatening emergency to the triager    Negative: MODERATE difficulty breathing (e.g., speaks in phrases, SOB even at rest, pulse 100-120) and still present when not coughing    Negative: Chest pain present when not coughing    Negative: Passed out (i.e., fainted, collapsed  "and was not responding)    Negative: Patient sounds very sick or weak to the triager    Negative: MILD difficulty breathing (e.g., minimal/no SOB at rest, SOB with walking, pulse <100) and still present when not coughing    Negative: Coughed up > 1 tablespoon (15 ml) blood (Exception: Blood-tinged sputum.)    Negative: Fever > 103 F (39.4 C)    Negative: Fever > 101 F (38.3 C) and over 60 years of age    Negative: Fever > 100.0 F (37.8 C) and has diabetes mellitus or a weak immune system (e.g., HIV positive, cancer chemotherapy, organ transplant, splenectomy, chronic steroids)    Negative: Fever > 100.0 F (37.8 C) and bedridden (e.g., nursing home patient, stroke, chronic illness, recovering from surgery)    Negative: Increasing ankle swelling    Negative: Wheezing is present    Negative: Using nasal washes and pain medicine > 24 hours and sinus pain persists    Negative: Fever returns after gone for over 24 hours and symptoms worse or not improved    Negative: Fever present > 3 days (72 hours)    Negative: Coughing up ketan-colored (reddish-brown) or blood-tinged sputum    Negative: SEVERE coughing spells (e.g., whooping sound after coughing, vomiting after coughing)    Answer Assessment - Initial Assessment Questions  1. ONSET: \"When did the cough begin?\"       About 10 days ago  2. SEVERITY: \"How bad is the cough today?\"       Thrown out back, frequent, productive  3. SPUTUM: \"Describe the color of your sputum\" (none, dry cough; clear, white, yellow, green)     Thick green sputum  4. HEMOPTYSIS: \"Are you coughing up any blood?\" If so ask: \"How much?\" (flecks, streaks, tablespoons, etc.)      no  5. DIFFICULTY BREATHING: \"Are you having difficulty breathing?\" If Yes, ask: \"How bad is it?\" (e.g., mild, moderate, severe)     - MILD: No SOB at rest, mild SOB with walking, speaks normally in sentences, can lie down, no retractions, pulse < 100.     - MODERATE: SOB at rest, SOB with minimal exertion and prefers to sit, " "cannot lie down flat, speaks in phrases, mild retractions, audible wheezing, pulse 100-120.     - SEVERE: Very SOB at rest, speaks in single words, struggling to breathe, sitting hunched forward, retractions, pulse > 120       No difficulty breathing  6. FEVER: \"Do you have a fever?\" If Yes, ask: \"What is your temperature, how was it measured, and when did it start?\"      no  7. CARDIAC HISTORY: \"Do you have any history of heart disease?\" (e.g., heart attack, congestive heart failure)       See cardiologist 1x year  8. LUNG HISTORY: \"Do you have any history of lung disease?\"  (e.g., pulmonary embolus, asthma, emphysema)      COPD  9. PE RISK FACTORS: \"Do you have a history of blood clots?\" (or: recent major surgery, recent prolonged travel, bedridden)      Traveled on a plane.  10. OTHER SYMPTOMS: \"Do you have any other symptoms?\" (e.g., runny nose, wheezing, chest pain)        Cough worst symptomed  11. PREGNANCY: \"Is there any chance you are pregnant?\" \"When was your last menstrual period?\"       NA  12. TRAVEL: \"Have you traveled out of the country in the last month?\" (e.g., travel history, exposures)        Yes traveled to [place] returned today    Protocols used: COUGH-A-OH      "

## 2023-05-24 ENCOUNTER — MYC MEDICAL ADVICE (OUTPATIENT)
Dept: FAMILY MEDICINE | Facility: CLINIC | Age: 73
End: 2023-05-24
Payer: COMMERCIAL

## 2023-05-24 NOTE — TELEPHONE ENCOUNTER
RN called patient to further triage symptoms. Patient states that she is doing a lot better than before. She is keeping up with her fluids, up and moving around, taking Mucinex DM, and [place] pearls as needed. Patient reports that her O2 saturations are averaging at 94.6% and up to a high of 96%. States that she does continue to have a cough that is getting better. Coughing about once every hour or so. Patient woke up this morning feeling really great, and then after drinking water and moving around she was able to expel green phlegm. Reports that the phlegm is starting to get much whiter and thinning. Back spasm has also improved as she is not coughing as much as before.    Denies having any fever, shortness of breath, chest pain, or dizziness. RN advised patient should continue to monitor her symptoms at home. Reviewed symptoms to return to  or to have follow-up with provider in clinic. Patient in agreement to plan, and feels like she is getting better but will monitor her symptoms. She will call back with any further concerns.    YENI Sheriff  [facility] Primary Care Triage

## 2023-05-26 DIAGNOSIS — J30.2 SEASONAL ALLERGIC RHINITIS, UNSPECIFIED TRIGGER: ICD-10-CM

## 2023-05-26 RX ORDER — FLUTICASONE PROPIONATE 50 MCG
SPRAY, SUSPENSION (ML) NASAL
Qty: 48 ML | Refills: 0 | Status: SHIPPED | OUTPATIENT
Start: 2023-05-26 | End: 2023-08-23

## 2023-06-02 ENCOUNTER — OFFICE VISIT (OUTPATIENT)
Dept: FAMILY MEDICINE | Facility: CLINIC | Age: 73
End: 2023-06-02
Payer: COMMERCIAL

## 2023-06-02 VITALS
WEIGHT: 114.8 LBS | BODY MASS INDEX: 20.34 KG/M2 | TEMPERATURE: 97.4 F | RESPIRATION RATE: 22 BRPM | DIASTOLIC BLOOD PRESSURE: 74 MMHG | OXYGEN SATURATION: 96 % | SYSTOLIC BLOOD PRESSURE: 122 MMHG | HEART RATE: 91 BPM | HEIGHT: 63 IN

## 2023-06-02 DIAGNOSIS — J44.9 CHRONIC OBSTRUCTIVE PULMONARY DISEASE, UNSPECIFIED COPD TYPE (H): Primary | ICD-10-CM

## 2023-06-02 PROCEDURE — 99214 OFFICE O/P EST MOD 30 MIN: CPT | Performed by: NURSE PRACTITIONER

## 2023-06-02 RX ORDER — PREDNISONE 20 MG/1
40 TABLET ORAL DAILY
Qty: 10 TABLET | Refills: 0 | Status: SHIPPED | OUTPATIENT
Start: 2023-06-02 | End: 2023-06-07

## 2023-06-02 RX ORDER — GUAIFENESIN 600 MG/1
600-1200 TABLET, EXTENDED RELEASE ORAL 2 TIMES DAILY
COMMUNITY

## 2023-06-02 RX ORDER — DOXYCYCLINE 100 MG/1
100 CAPSULE ORAL 2 TIMES DAILY
Qty: 10 CAPSULE | Refills: 0 | Status: SHIPPED | OUTPATIENT
Start: 2023-06-02 | End: 2023-06-07

## 2023-06-02 ASSESSMENT — PAIN SCALES - GENERAL: PAINLEVEL: EXTREME PAIN (8)

## 2023-06-02 ASSESSMENT — ENCOUNTER SYMPTOMS: COUGH: 1

## 2023-06-02 NOTE — TELEPHONE ENCOUNTER
Patient calling back to report that she continues to have persistent productive cough with yellow/green phlegm. Patient thinks that she still has the infection after finishing antibiotic course. Patient reports that she doesn't feel worse or no new symptoms, but would like further evaluation. Denies fever, SOB, wheezing, dizziness, or pain. RN advised that patient should be seen in office for further evaluation, and assisted in scheduling visit this afternoon 6/2/2023. Patient in agreement with plan. No further questions or concerns.    YENI Sheriff  [facility] Primary Care Triage

## 2023-06-02 NOTE — PROGRESS NOTES
Assessment & Plan     Chronic obstructive pulmonary disease, unspecified COPD type (H), exacerbation as she has been having increased sputum production and change in color  - doxycycline hyclate (VIBRAMYCIN) 100 MG capsule; Take 1 capsule (100 mg) by mouth 2 times daily for 5 days  - predniSONE (DELTASONE) 20 MG tablet; Take 2 tablets (40 mg) by mouth daily for 5 days               A total of 30 minutes were spent face-to-face with the patient during this encounter and over half of that time was spend on counseling and coordination of care.  We discussed in depth the nature of problem(s) listed above (see diagnoses listed above), course, prior treatments, and therapeutic options.  I also educated the patient about lifestyle modifications which may improve the problem.    Radha Quiñones NP  [facility]    Arnulfo Enamorado is a 72 year old, presenting for the following health issues:  Cough (Since 5/19/2023)        6/2/2023     3:15 PM   Additional Questions   Roomed by Edna Rosenberg    History of Present Illness       Reason for visit:  Check chest/lungs for continued symptoms of infection after completing space prescription.    She eats 4 or more servings of fruits and vegetables daily.She consumes 0 sweetened beverage(s) daily.She exercises with enough effort to increase her heart rate 9 or less minutes per day.  She exercises with enough effort to increase her heart rate 3 or less days per week.   She is taking medications regularly.    Seen 5/19/2023, given Benzonate  & z diana     Acute Illness  Acute illness concerns: cough   Onset/Duration:5/19/2023  Symptoms:  Fever: YES- weeks ago   Chills/Sweats: YES  Headache (location?): No  Sinus Pressure: No  Conjunctivitis:  No  Ear Pain: no  Rhinorrhea: YES  Congestion: YES- green in color   Sore Throat: No  Cough: YES-productive of green sputum  Wheeze: No  Decreased Appetite: No  Nausea: No  Vomiting: No  Diarrhea: No  Dysuria/Freq.:  "No  Dysuria or Hematuria: No  Fatigue/Achiness: No  Sick/Strep Exposure: No  Therapies tried and outcome: ibuprefen and Mucinex    Nurse telephone note from today copied here:  \"Patient calling back to report that she continues to have persistent productive cough with yellow/green phlegm. Patient thinks that she still has the infection after finishing antibiotic course. Patient reports that she doesn't feel worse or no new symptoms, but would like further evaluation. Denies fever, SOB, wheezing, dizziness, or pain.\"    Additional provider notes:    She took the Azithromycin and Benzonate 5/19/23 and got quite a bit better.  Still has quite a bit of green phlegm which is unusual for her at baseline.  Taking Guaifenesin  Loose phlegm from the chest.      Review of Systems   Respiratory: Positive for cough.           Objective    /74 (BP Location: Right arm, Patient Position: Sitting, Cuff Size: Adult Regular)   Pulse 91   Temp 97.4  F (36.3  C) (Oral)   Resp 22   Ht 1.6 m (5' 3\")   Wt 52.1 kg (114 lb 12.8 oz)   SpO2 96%   BMI 20.34 kg/m    Body mass index is 20.34 kg/m .  Physical Exam   GENERAL: healthy, alert and no distress  EYES: Eyes grossly normal to inspection, PERRL and conjunctivae and sclerae normal  HENT: ear canals and TM's normal, nose and mouth without ulcers or lesions  NECK: no adenopathy  RESP: lungs clear to auscultation - no rales, rhonchi or wheezes  CV: regular rate and rhythm, normal S1 S2, no S3 or S4, no murmur, click or rub, no peripheral edema and peripheral pulses strong  PSYCH: mentation appears normal, affect normal/bright                "

## 2023-06-05 ENCOUNTER — LAB (OUTPATIENT)
Dept: LAB | Facility: CLINIC | Age: 73
End: 2023-06-05
Payer: COMMERCIAL

## 2023-06-05 DIAGNOSIS — E78.5 HYPERLIPIDEMIA, ACQUIRED: ICD-10-CM

## 2023-06-05 DIAGNOSIS — I25.10 CORONARY ARTERY DISEASE INVOLVING NATIVE CORONARY ARTERY OF NATIVE HEART WITHOUT ANGINA PECTORIS: ICD-10-CM

## 2023-06-05 LAB
CHOLEST SERPL-MCNC: 160 MG/DL
HDLC SERPL-MCNC: 65 MG/DL
LDLC SERPL CALC-MCNC: 80 MG/DL
NONHDLC SERPL-MCNC: 95 MG/DL
TRIGL SERPL-MCNC: 73 MG/DL

## 2023-06-05 PROCEDURE — 80061 LIPID PANEL: CPT

## 2023-06-05 PROCEDURE — 36415 COLL VENOUS BLD VENIPUNCTURE: CPT

## 2023-06-08 ENCOUNTER — OFFICE VISIT (OUTPATIENT)
Dept: CARDIOLOGY | Facility: CLINIC | Age: 73
End: 2023-06-08
Attending: INTERNAL MEDICINE
Payer: COMMERCIAL

## 2023-06-08 VITALS
OXYGEN SATURATION: 95 % | BODY MASS INDEX: 20.34 KG/M2 | SYSTOLIC BLOOD PRESSURE: 158 MMHG | DIASTOLIC BLOOD PRESSURE: 78 MMHG | HEART RATE: 105 BPM | WEIGHT: 114.8 LBS

## 2023-06-08 DIAGNOSIS — R93.89 ABNORMAL CT SCAN, CHEST: ICD-10-CM

## 2023-06-08 DIAGNOSIS — R03.0 ELEVATED BLOOD PRESSURE READING WITHOUT DIAGNOSIS OF HYPERTENSION: ICD-10-CM

## 2023-06-08 DIAGNOSIS — I35.0 NON-RHEUMATIC AORTIC STENOSIS: ICD-10-CM

## 2023-06-08 DIAGNOSIS — I25.10 CORONARY ARTERY DISEASE INVOLVING NATIVE CORONARY ARTERY OF NATIVE HEART WITHOUT ANGINA PECTORIS: Primary | ICD-10-CM

## 2023-06-08 DIAGNOSIS — E78.5 HYPERLIPIDEMIA, ACQUIRED: ICD-10-CM

## 2023-06-08 PROCEDURE — 99215 OFFICE O/P EST HI 40 MIN: CPT | Performed by: INTERNAL MEDICINE

## 2023-06-08 PROCEDURE — G2212 PROLONG OUTPT/OFFICE VIS: HCPCS | Performed by: INTERNAL MEDICINE

## 2023-06-08 NOTE — PROGRESS NOTES
Chief complaint: coronary artery calcifications on CT    HPI:   Sienna Bennett is a 72 year old female with history of COPD and essential thrombocytosis (diagnosed by bone marrow biopsy 2001, low-risk features per Hematology) referred for coronary artery calcifications.     Her CAD risk factor profile is: -HTN +HL -DM +former tobacco (1 ppd x 46 years, quit 1/1/2014)     She was seen by her PCP, Dr. Valle, on 3/2022 (note reviewed); CT chest showed coronary artery calcification prompting referral. She was also scheduled for pulmonology referral and PFTs and started on a bronchodilator.     She does report exertional dyspnea. Her exertional threshold for dyspnea is ~1 flight of stairs or carrying laundry and walking quickly. Her main sensation of dyspnea is not being able to get a good breath. She describes a sensation of poor air movement. Her dyspnea has been worse recently due to the heat and humidity. Symptoms are better when she is indoors. She has not used her albuterol inhaler. She denies chest pain/chest pressure    She reports rare palpitations at night, which resolve after taking a deep breath. Symptoms last <30 seconds. This is new over the past 10-12 months. Symptoms occur <1x/week and are unpredictable and are relatively stable over the past year.     06/08/23:  Since her last visit, Kelsea reports feeling generally well and has no complaints. She has been under more stress than usual, as her mother required 2 pacemaker procedures and has struggled with recovery; Kelsea has had to provide a lot of support recently despite her mother moving into assisted living.  She did recently travel to [place] and is recovering from a lingering upper respiratory infection with productive cough, which is slowly improving. Her dyspnea has been close to her baseline.    She denies any chest pain, dyspnea at rest or exertional dyspnea above baseline, PND, orthopnea, peripheral edema, lightheadedness or syncope.            PAST MEDICAL HISTORY:  Essential thrombocytosis  COPD    CURRENT MEDICATIONS:  Current Outpatient Medications   Medication Sig Dispense Refill     aspirin 81 MG chewable tablet Take 81 mg by mouth daily. (Patient not taking: Reported on 2023)       benzonatate (TESSALON) 200 MG capsule Take 1 capsule (200 mg) by mouth 3 times daily as needed for cough (Patient not taking: Reported on 2023) 20 capsule 0     Cholecalciferol (VITAMIN D PO) Take  by mouth. (Patient not taking: Reported on 2023)       fluticasone (FLONASE) 50 MCG/ACT nasal spray INSTILL 2 SPRAYS INTO BOTH NOSTRILS DAILY 48 mL 0     Fluticasone-Umeclidin-Vilanterol (TRELEGY ELLIPTA) 200-62.5-25 MCG/ACT oral inhaler Inhale 1 puff into the lungs daily 3 each 3     guaiFENesin (MUCINEX) 600 MG 12 hr tablet Take 600-1,200 mg by mouth 2 times daily       hydroxyurea (HYDREA) 500 MG capsule Take 1 capsule (500 mg) by mouth daily 5 days/week with 1000 mg the other 2 days 225 capsule 4     levalbuterol (XOPENEX HFA) 45 MCG/ACT inhaler INHALE 2 PUFFS INTO THE LUNGS EVERY 4 HOURS AS NEEDED FOR SHORTNESS OF BREATH / DYSPNEA OR WHEEZING 15 g 1     loratadine (CLARITIN) 10 MG tablet Take 10 mg by mouth daily       rosuvastatin (CRESTOR) 20 MG tablet Take 1 tablet (20 mg) by mouth daily 90 tablet 3       ALLERGIES:     Allergies   Allergen Reactions     Wellbutrin [Bupropion]        FAMILY HISTORY:  Family History   Problem Relation Age of Onset     Hypertension Mother      Hyperlipidemia Mother      Cerebrovascular Disease Father         At age 86     Prostate Cancer Father      Hypertension Brother      Other Cancer Brother         Bladder, Lung     SOCIAL HISTORY:  Social History     Tobacco Use     Smoking status: Former     Packs/day: 1.00     Years: 46.00     Pack years: 46.00     Types: Cigarettes     Start date: 1968     Quit date: 2014     Years since quittin.4     Smokeless tobacco: Never     Tobacco comments:     Quit in  2013   Vaping Use     Vaping status: Never Used   Substance Use Topics     Alcohol use: Yes     Comment: rare- maybe once a month     Drug use: No       ROS:   A comprehensive 14 point review of systems is negative other than as mentioned in HPI.    Exam:  There were no vitals taken for this visit.  GENERAL APPEARANCE: healthy, alert and no distress  EYES: no icterus, no xanthelasmas  ENT: normal palate, mucosa moist, no central cyanosis  NECK: JVP not elevated  RESPIRATORY:soft breath sounds with prolonged expiratory phase, no wheezes  CARDIOVASCULAR: regular rhythm, normal S1 with physiologic split S2, no S3 or S4 and no murmur, click or rub.  GI: soft, non tender, bowel sounds normal,no abdominal bruits  EXTREMITIES: no edema, no bruits  NEURO: alert and oriented to person/place/time, normal speech, gait and affect  VASC: Radial, dorsalis pedis and posterior tibialis pulses 2+ bilaterally.  SKIN: no ecchymoses, no rashes.  PSYCH: cooperative, affect appropriate.     Labs:  Reviewed.   Lipids 2/25/22: chol 294 HDL 79    Lipids 6/5/23: chol 160  HDL 65 LDL 80 TG 73    Testing/Procedures:  ECG 4/19/22: sinus rhythm with nonspecific T-wave abnormality and borderline low voltage QRS    TTE 4/21/22:   Left and right ventricular size, wall motion and function are normal. The ejection  fraction is 55-60%.  Mild aortic stenosis (PV 2.7 m/s MG 15 mmHg DVI 0.46 MOSHE 1.5 cm2 SVI 47 mL/m2; LVOT VTI is suboptimal which may result in overestimation of aortic stenosis severity based on DVI and MOSHE)    CT 3/17/22 (read by Dr. Moore and reviewed by me):  Moderate 3-vessel coronary calcification  Aortic valve calcification.  Ascending aorta, arch, descending aorta calcifications. Evidence of bronchiolitis, centrilobular emphysema    I personally visualized and interpreted:      Assessment and Plan:   1. CAD (coronary artery calcifications) on CT without angina  2. HL, controlled  3. COPD  4. Exertional dyspnea, most  likely related to pulmonary disease  -continue ASA 81 mg daily  -continue rosuvastatin 20 mg daily  -counseled Kelsea to try to gradually increase her activity, ideally to goal of 150 minutes of moderate-intensity xercise per week; emphasized that any additional acitivity has a benefit      5. Mild aortic stenosis  -repeat TTE 2 years (2025) or for change in symptoms    6. Elevated BP without diagnosis of HTN  Kelsea is currently on prednisone (1 more day of treatment) and has also been under considerable stress. All recent outpatient BPs have been 110s-120s systolic.   -instructed her to check BPs at home and send 2 weeks of daily readings once she is off prednisone  -if BP remains high off prednisone or if she will need to remain on prednisone longer-term, would start an antihypertensive (likely amlodipine)    Follow up in 1 year with fasting lipids prior.    The patient states understanding and is agreeable with plan.   Total time spent Jun 8, 2023: 69 minutes    Bogdan Crespo MD  Cardiology    CC  NIDIA GASTON

## 2023-06-08 NOTE — PROGRESS NOTES
Sienna Bennett's goals for this visit include: Just got back from a trip to [place] and has been battling an upper respiratory infection. Almost through the second round of antibiotics.     She requests these members of her care team be copied on today's visit information:         Referring Provider:  Bogdan Crespo MD  [address] [place] [address]    BP (!) 159/81 (BP Location: Left arm, Patient Position: Sitting, Cuff Size: Adult Regular)   Pulse 105   Wt 52.1 kg (114 lb 12.8 oz)   SpO2 95%   BMI 20.34 kg/m        Do you need any medication refills at today's visit? No.    Gavin Holcomb, EMT  Clinic Support  [facility]    [phone]    Employed by [facility] [employer]

## 2023-06-22 ENCOUNTER — MYC MEDICAL ADVICE (OUTPATIENT)
Dept: CARDIOLOGY | Facility: CLINIC | Age: 73
End: 2023-06-22
Payer: COMMERCIAL

## 2023-06-24 DIAGNOSIS — I25.10 CORONARY ARTERY DISEASE INVOLVING NATIVE CORONARY ARTERY OF NATIVE HEART WITHOUT ANGINA PECTORIS: ICD-10-CM

## 2023-06-24 DIAGNOSIS — E78.5 HYPERLIPIDEMIA, ACQUIRED: ICD-10-CM

## 2023-06-25 RX ORDER — ROSUVASTATIN CALCIUM 20 MG/1
20 TABLET, COATED ORAL DAILY
Qty: 90 TABLET | Refills: 3 | Status: SHIPPED | OUTPATIENT
Start: 2023-06-25 | End: 2024-03-28

## 2023-07-05 ENCOUNTER — ANCILLARY PROCEDURE (OUTPATIENT)
Dept: CT IMAGING | Facility: CLINIC | Age: 73
End: 2023-07-05
Attending: INTERNAL MEDICINE
Payer: COMMERCIAL

## 2023-07-05 ENCOUNTER — MYC MEDICAL ADVICE (OUTPATIENT)
Dept: PULMONOLOGY | Facility: CLINIC | Age: 73
End: 2023-07-05

## 2023-07-05 DIAGNOSIS — R91.8 PULMONARY NODULES: ICD-10-CM

## 2023-07-05 PROCEDURE — 71250 CT THORAX DX C-: CPT | Mod: GC | Performed by: RADIOLOGY

## 2023-07-06 NOTE — TELEPHONE ENCOUNTER
Pulmonary Nodule Conference - July 7th, 2023      Patient Name: Sienna Bennett    Reason for conference discussion (brief overview): 72 year old Female. Former 46 ppy smoker, quit 9 years ago. PFTs from February show moderate COPD with FEV1 of 44 and DLCO of 65. Functionally active. Family hx of lung cancer (brother). Had lung cancer screening CT in April which showed a new VARSHA solid nodule - she declined lung nodule referral and had follow up CT scan 7/5 which showed an increasing 8 x 8 mm spiculated left upper lobe subpleural pulmonary nodule (was previously 6 x 7 mm on April CT).     Specific Question:  Dr. Walker's question is if she needs a biopsy now vs continued surveillance? If biopsy, who will do it? If surveillance, how long?    Pertinent Histology:  n/a    Referring Physician: Dr. Walker    The patient's case was presented at the multidisciplinary conference for the above noted reason.  There was a consensus recommendation for the following actions:     Team consensus is referral to Thoracic surgery for potential wedge.   Thoracic surgery referral placed.     Case Lead:  CLAUDIA Boswell RNCC    Interventional Radiology Staff Present: Dr. Nelson    RNCC will let Dr. Walker know of team consensus to let pt know plan.

## 2023-07-07 ENCOUNTER — TEAM CONFERENCE (OUTPATIENT)
Dept: PULMONOLOGY | Facility: CLINIC | Age: 73
End: 2023-07-07
Payer: COMMERCIAL

## 2023-07-07 DIAGNOSIS — R91.8 PULMONARY NODULES: Primary | ICD-10-CM

## 2023-07-10 ENCOUNTER — PATIENT OUTREACH (OUTPATIENT)
Dept: SURGERY | Facility: CLINIC | Age: 73
End: 2023-07-10

## 2023-07-10 NOTE — PROGRESS NOTES
Thoracic Surgery referral received from [facility] Pul Dr Walker for pt with growing lung nodule.    Case discussed at Tumor Conference 7/7/23; growing lung nodule noted on CT 4/2023 & 7/5/23. Recommend consult with Thoracic Surgery to discuss wedge resection. PFTs done 2/2023.     Will offer next available Thoracic Surgeon (Dr Elias 7/19/23) or per pt preference.        German Chandler RN  Oncology Nurse Navigator  [facility]  [phone]

## 2023-07-10 NOTE — RESULT ENCOUNTER NOTE
8 x 8 mm spiculated left upper lobe subpleural pulmonary nodule. Centrilobular emphysematous changes.

## 2023-07-13 ENCOUNTER — LAB (OUTPATIENT)
Dept: LAB | Facility: CLINIC | Age: 73
End: 2023-07-13
Payer: COMMERCIAL

## 2023-07-13 DIAGNOSIS — I25.10 CORONARY ARTERY DISEASE INVOLVING NATIVE CORONARY ARTERY OF NATIVE HEART WITHOUT ANGINA PECTORIS: ICD-10-CM

## 2023-07-13 DIAGNOSIS — D47.3 ESSENTIAL THROMBOCYTHEMIA (H): ICD-10-CM

## 2023-07-13 DIAGNOSIS — E78.5 HYPERLIPIDEMIA, ACQUIRED: ICD-10-CM

## 2023-07-13 LAB
BASOPHILS # BLD AUTO: 0 10E3/UL (ref 0–0.2)
BASOPHILS NFR BLD AUTO: 1 %
CHOLEST SERPL-MCNC: 174 MG/DL
EOSINOPHIL # BLD AUTO: 0.1 10E3/UL (ref 0–0.7)
EOSINOPHIL NFR BLD AUTO: 2 %
ERYTHROCYTE [DISTWIDTH] IN BLOOD BY AUTOMATED COUNT: 12.8 % (ref 10–15)
HCT VFR BLD AUTO: 41.5 % (ref 35–47)
HDLC SERPL-MCNC: 65 MG/DL
HGB BLD-MCNC: 13.6 G/DL (ref 11.7–15.7)
IMM GRANULOCYTES # BLD: 0 10E3/UL
IMM GRANULOCYTES NFR BLD: 0 %
LDLC SERPL CALC-MCNC: 87 MG/DL
LYMPHOCYTES # BLD AUTO: 0.9 10E3/UL (ref 0.8–5.3)
LYMPHOCYTES NFR BLD AUTO: 21 %
MCH RBC QN AUTO: 37.8 PG (ref 26.5–33)
MCHC RBC AUTO-ENTMCNC: 32.8 G/DL (ref 31.5–36.5)
MCV RBC AUTO: 115 FL (ref 78–100)
MONOCYTES # BLD AUTO: 0.4 10E3/UL (ref 0–1.3)
MONOCYTES NFR BLD AUTO: 9 %
NEUTROPHILS # BLD AUTO: 2.8 10E3/UL (ref 1.6–8.3)
NEUTROPHILS NFR BLD AUTO: 68 %
NONHDLC SERPL-MCNC: 109 MG/DL
PLATELET # BLD AUTO: 332 10E3/UL (ref 150–450)
RBC # BLD AUTO: 3.6 10E6/UL (ref 3.8–5.2)
TRIGL SERPL-MCNC: 108 MG/DL
WBC # BLD AUTO: 4.1 10E3/UL (ref 4–11)

## 2023-07-13 PROCEDURE — 36415 COLL VENOUS BLD VENIPUNCTURE: CPT

## 2023-07-13 PROCEDURE — 80061 LIPID PANEL: CPT

## 2023-07-13 PROCEDURE — 85025 COMPLETE CBC W/AUTO DIFF WBC: CPT

## 2023-07-14 NOTE — TELEPHONE ENCOUNTER
Referring Provider/Location:  Gilda Walker  Dx and Code: Pulmonary nodules [R91.8]  Appt Date:  7.19.23  Provider: Curt Trammell    July 14, 2023 10:47 AM TJ   Internal Referral, No outside records needed

## 2023-07-18 NOTE — PROGRESS NOTES
Virtual Visit Details    Type of service:  Video Visit     Originating Location (pt. Location): Home    Distant Location (provider location):  On-site  Platform used for Video Visit: Shantanu      [facility] Oncology/Hematology Note       Date of visit: July 20, 2023  Outpatient Clinic Note        Assessment:     1. Essential thrombocythemia, approavching 22 years from the time of diagnosis, with stable platelet count on current dosing of hydroxyurea, 1000 mg p.o. Saturdays and Sundays, with 500 mg p.o. daily the rest of the week.   The chance of this becoming a major issue for her is extraordinarily low, though not zero.  There is a one in 20 chance of progressing to either myelofibrosis or an acute leukemia.  This is why I have continued  to see Kelsea and its been a pleasure to do so.  2. As has always been my concern, the major healthcare issue is related to her heavy smoking history. She has been struggling with worsening COPD. And now, she has an isolated suspicious lung nodule, concerning for primary lung cancer.  She has seen one of the surgeons here and I took the liberty of discussing this with another thoracic surgeon today, Dr. Palma.  Kelsea's case will be presented at the nodule conference.  Kelsea told me that Dr. Elias does not wish to do a biopsy because he is worried about a collapsed lung.  He thought that perhaps she could be referred specifically for primary radiotherapy, but I do not believe that this can be done without a biopsy.  Alternatively she could have the whole thing resected with a wedge or a lobectomy.  Apparently her lung function may not allow for this.  At any rate she is seeing our terrific lung cancer program and I will follow along with her.  If she needs a medical oncologist, I work with some wonderful ones here at the [facility].  3. Multiple other psychosocial stressors discussed at length. Her 94 year old mother, who lives with she and Jason, has progressive  dementia, and it certainly makes sense to get her into a memory care facility.        Plan:     1. Continue work-up per the thoracic surgeon regarding her nodule.  She will be presented at conference and a CT/PET scan will be obtained.    2. Continue current dosing of hydroxyurea  3. Continue current dosing of aspirin  4. Continue follow-up with Dr. Valle  5. Return to clinic with me in 6 months with a CBC as we continue in follow-up mode. Per Kelsea's request, we can continue with virtual visits.      Sanjay Lambert MD, MSc  Associate Professor of Medicine  [employer]  [facility]  [address] [place] [address]  [phone]    __________________________________________________________________    CHIEF COMPLAINT     1. Essential thrombocythemia, diagnosed by bone marrow biopsy on 04/10/2001.  Kelsea has low risk features but has a significantly increased risk of thrombosis because of her long-term history of smoking.  She has quit for the last 4-5 years.  2. I have known Kelsea for 20 years and she had always refused to see a primary care provider, even though it was my assumption that she has COPD based on exam findings.  She may also have hypertension and hyperlipidemia, things I do not manage.  She finally established care with Dr. Kalli Valle last year.    History of Present Illness:       Hydroxyurea at varying doses.  She has been on this for 20 years.  Her current dose is 1000 mg p.o. daily on Saturdays and Sundays, with 500 mg daily the rest of the week    She also remains on a daily aspirin.      Interval history:       Kelsea is back for her visit    Heart scan showed calcifications. Saw cardiology who recommended she stay on ASA and rosuvastatin.    She is feeling about the same.    Has been going through a lung cancer work-up.  She did have a CT scan 3 months ago that showed a spiculated 6 mm left upper lobe pulmonary nodule, with growth by 2  "mm on a repeat CT scan recently done.  She has seen Dr. Elias thoracic surgeon who feels that her lung function test may not be adequate for surgery.  He worries about the biopsy and thinks primary radiotherapy is indicated.  She will be presented at the upcoming lung nodule conference for further work-up.    Mom recently passed away at Kelsea's home.. She had required 2 pacemaker procedures and has struggled with recovery; Kelsea has had to provide a lot of support recently despite her mother moving into assisted living.  Her mother \"was done.\" This was very hard on Kelsea.  Discussed at length.    Recently found to have a pulmonary nodule, and has grown by 2-3 mm over 3 months.  Recommended for resection. Saw surgery yesterday and is not in agreement. Considering biopsy. Will be presented at lung conference    Has a 4 month round the world cruise scheduled from January to April. This is non negotiable and any plan on treatment for jazmyne must take this into account.          Past Medical History:   I have reviewed this patient's past medical history   Past Medical History:   Diagnosis Date     COPD (chronic obstructive pulmonary disease) (H) 2014     Essential thrombocythemia (H) 7/20/2022     Heart disease 2022          Past Surgical History:    I have reviewed this patient's past surgical history       Social History:   Tobacco, ETOH, and rec drugs reviewed and as noted below with the following exceptions:  Kelsea is  to Jason who is 15 years her senior, but has no biologic children.  She is originally from [place].  She has been traveling back and forth to [place] because her 93-year-old mother, who most recently lived with her and her  Jason, wanted to see family.  Kelsea's brothers think of her as a Saint.  She would like them to share some of the burden of their mother's care.            Family History:     Family History   Problem Relation Age of Onset     Hypertension Mother      Hyperlipidemia Mother      " Cerebrovascular Disease Father         At age 86     Prostate Cancer Father      Hypertension Brother      Other Cancer Brother         Bladder, Lung            Medications:     Current Outpatient Medications   Medication Sig Dispense Refill     fluticasone (FLONASE) 50 MCG/ACT nasal spray INSTILL 2 SPRAYS INTO BOTH NOSTRILS DAILY 48 mL 0     Fluticasone-Umeclidin-Vilanterol (TRELEGY ELLIPTA) 200-62.5-25 MCG/ACT oral inhaler Inhale 1 puff into the lungs daily 3 each 3     guaiFENesin (MUCINEX) 600 MG 12 hr tablet Take 600-1,200 mg by mouth 2 times daily       hydroxyurea (HYDREA) 500 MG capsule Take 1 capsule (500 mg) by mouth daily 5 days/week with 1000 mg the other 2 days 225 capsule 4     levalbuterol (XOPENEX HFA) 45 MCG/ACT inhaler INHALE 2 PUFFS INTO THE LUNGS EVERY 4 HOURS AS NEEDED FOR SHORTNESS OF BREATH / DYSPNEA OR WHEEZING 15 g 1     loratadine (CLARITIN) 10 MG tablet Take 10 mg by mouth daily       rosuvastatin (CRESTOR) 20 MG tablet Take 1 tablet (20 mg) by mouth daily 90 tablet 3              Physical Exam:   There were no vitals taken for this visit.    ECOG PS: 0  Constitutional: WDWN female in NAD, pleasant and appropriate  Neurologic: alert, answering questions appropriately, moving all extremities spontaneously. CN 2-12 grossly intact.  Psych: appropriate affect  No further exam could be done because this was a virtual visit  Data:      Component Ref Range & Units 5 d ago  (7/13/23) 6 mo ago  (1/12/23) 1 yr ago  (7/18/22) 1 yr ago  (4/19/22) 1 yr ago  (2/25/22) 1 yr ago  (1/17/22)    WBC Count 4.0 - 11.0 10e3/uL 4.1  4.8  5.2  5.5  5.0  3.7 Low      RBC Count 3.80 - 5.20 10e6/uL 3.60 Low   3.62 Low   3.65 Low   3.57 Low   3.63 Low   3.45 Low      Hemoglobin 11.7 - 15.7 g/dL 13.6  13.6  13.9  13.7  13.9  13.3     Hematocrit 35.0 - 47.0 % 41.5  41.2  40.2  41.2  42.1  40.5     MCV 78 - 100 fL 115 High   114 High   110 High   115 High   116 High   117 High      MCH 26.5 - 33.0 pg 37.8 High    37.6 High   38.1 High   38.4 High   38.3 High   38.6 High      MCHC 31.5 - 36.5 g/dL 32.8  33.0  34.6  33.3  33.0  32.8     RDW 10.0 - 15.0 % 12.8  12.9  12.4  12.0  12.2  12.8     Platelet Count 150 - 450 10e3/uL 332  399  490 High   479 High   485 High   421          No results found for this or any previous visit (from the past 24 hour(s)).    Other Data     CT Chest:  1. Continued increase in left upper lobe spiculated solid nodule now  measuring 8 mm is considered lung rads 4B. Consider contrast CT and/or  biopsy.    Labs, imaging and treatment plan reviewed with patient. All questions answered.        40 minutes spent on the date of the encounter doing chart review, review of outside records, review of test results, interpretation of tests, patient visit and documentation

## 2023-07-19 ENCOUNTER — ONCOLOGY VISIT (OUTPATIENT)
Dept: SURGERY | Facility: CLINIC | Age: 73
End: 2023-07-19
Attending: INTERNAL MEDICINE
Payer: COMMERCIAL

## 2023-07-19 ENCOUNTER — PRE VISIT (OUTPATIENT)
Dept: SURGERY | Facility: CLINIC | Age: 73
End: 2023-07-19
Payer: COMMERCIAL

## 2023-07-19 VITALS
TEMPERATURE: 98.1 F | OXYGEN SATURATION: 95 % | BODY MASS INDEX: 19.7 KG/M2 | WEIGHT: 111.2 LBS | SYSTOLIC BLOOD PRESSURE: 153 MMHG | RESPIRATION RATE: 16 BRPM | HEART RATE: 95 BPM | DIASTOLIC BLOOD PRESSURE: 94 MMHG

## 2023-07-19 DIAGNOSIS — R91.8 PULMONARY NODULES: ICD-10-CM

## 2023-07-19 PROCEDURE — 99213 OFFICE O/P EST LOW 20 MIN: CPT | Performed by: THORACIC SURGERY (CARDIOTHORACIC VASCULAR SURGERY)

## 2023-07-19 PROCEDURE — 99203 OFFICE O/P NEW LOW 30 MIN: CPT | Performed by: THORACIC SURGERY (CARDIOTHORACIC VASCULAR SURGERY)

## 2023-07-19 RX ORDER — ASPIRIN 81 MG/1
81 TABLET ORAL DAILY
COMMUNITY
Start: 2023-07-12

## 2023-07-19 ASSESSMENT — PAIN SCALES - GENERAL: PAINLEVEL: NO PAIN (0)

## 2023-07-19 NOTE — LETTER
7/19/2023         RE: Sienna Bennett  [address]  [place] [address]        Dear Colleague,    Thank you for referring your patient, Sienna Bennett, to the [facility]. Please see a copy of my visit note below.    THORACIC SURGERY - NEW PATIENT OFFICE VISIT      Dear Dr. Walker,    I saw Sienna Bennett in consultation for the evaluation and treatment of a a VARSHA indeterminate pulmonary nodule.     HPI  Sienna Bennett is a 72 year old female who presented with an enlarging VARSHA indeterminate pulmonary nodule. She was referred for diagnosis and consideration of resection.     Previsit Tests   2/15/23 PFT: FEV1 0.9L, 44%. DLCO  65%.  2/15/23 6MWT: 1245 ft.   7/5/23 CT scan: 8 mm VARSHA nodule, no adenopathy or effusion.              PMH  Severe coronary calcification seen on CT  Past Medical History:   Diagnosis Date    COPD (chronic obstructive pulmonary disease) (H) 2014    Essential thrombocythemia (H) 7/20/2022    Heart disease 2022        PSH    Past Surgical History:   Procedure Laterality Date    BIOPSY Left ?    breast bx from calcifications, benign    ORTHOPEDIC SURGERY  1967    Tendon repair Hand    THYROID SURGERY      left part of thyroid gland- age 22         Allergies   Allergen Reactions    Environmental [Adhesive Tape]     Wellbutrin [Bupropion]      Current Outpatient Medications   Medication    fluticasone (FLONASE) 50 MCG/ACT nasal spray    Fluticasone-Umeclidin-Vilanterol (TRELEGY ELLIPTA) 200-62.5-25 MCG/ACT oral inhaler    guaiFENesin (MUCINEX) 600 MG 12 hr tablet    hydroxyurea (HYDREA) 500 MG capsule    levalbuterol (XOPENEX HFA) 45 MCG/ACT inhaler    loratadine (CLARITIN) 10 MG tablet    rosuvastatin (CRESTOR) 20 MG tablet     No current facility-administered medications for this visit.     Social History     Tobacco Use    Smoking status: Former     Packs/day: 1.00     Years: 46.00     Pack years: 46.00     Types: Cigarettes     Start  date: 1968     Quit date: 2014     Years since quittin.5    Smokeless tobacco: Never    Tobacco comments:     Quit in    Vaping Use    Vaping Use: Never used   Substance Use Topics    Alcohol use: Yes     Comment: rare- maybe once a month    Drug use: No       Physical examination  BP (!) 153/94   Pulse 95   Temp 98.1  F (36.7  C) (Oral)   Resp 16   Wt 50.4 kg (111 lb 3.2 oz)   SpO2 95%   BMI 19.70 kg/m    Alert and oriented NAD  Bilateral breath sounds.     From a personal perspective, she comes to clinic with her .     IMPRESSION   72 year old female with VARSHA indeterminate pulmonary nodule.     Stage  [id number] by CT scan.     PLAN  I spent 30 min on the date of the encounter in chart review, patient visit, review of tests, documentation and/or discussion with other providers about the issues documented above. I reviewed the plan as follows:    I discussed at length with Kelsea her diagnosis and treatment options. I explained that given her low pulmonary reserve I would recommend SBRT. We can discuss if her nodule is amenable to percutaneous biopsy but I doubt it given the small size and apical location.     Necessary tests and appointments: PET scan, nodule conference (discuss biopsy), tumor board (discuss SBRT).     I appreciate the opportunity to participate in the care of your patient and will keep you updated.    Sincerely,    David Matthews MD

## 2023-07-19 NOTE — NURSING NOTE
"Oncology Rooming Note    July 19, 2023 1:56 PM   Sienna Bennett is a 72 year old female who presents for:    Chief Complaint   Patient presents with     Oncology Clinic Visit     New Eval for Pulmonary Nodules     Initial Vitals: Blood Pressure (Abnormal) 153/94   Pulse 95   Temperature 98.1  F (36.7  C) (Oral)   Respiration 16   Weight 50.4 kg (111 lb 3.2 oz)   Oxygen Saturation 95%   Body Mass Index 19.70 kg/m   Estimated body mass index is 19.7 kg/m  as calculated from the following:    Height as of 6/2/23: 1.6 m (5' 3\").    Weight as of this encounter: 50.4 kg (111 lb 3.2 oz). Body surface area is 1.5 meters squared.  No Pain (0) Comment: Data Unavailable   No LMP recorded. Patient is postmenopausal.  Allergies reviewed: Yes  Medications reviewed: Yes    Medications: Medication refills not needed today.  Pharmacy name entered into Prob[facility]: [facility] [id number] [place] [address]    Clinical concerns: none       Macie Hawkins MA            "

## 2023-07-19 NOTE — PROGRESS NOTES
THORACIC SURGERY - NEW PATIENT OFFICE VISIT      Dear Dr. Walker,    I saw Sienna Bennett in consultation for the evaluation and treatment of a a VARSHA indeterminate pulmonary nodule.     HPI  Sienna Bennett is a 72 year old female who presented with an enlarging VARSHA indeterminate pulmonary nodule. She was referred for diagnosis and consideration of resection.     Previsit Tests   2/15/23 PFT: FEV1 0.9L, 44%. DLCO  65%.  2/15/23 6MWT: 1245 ft.   23 CT scan: 8 mm VARSHA nodule, no adenopathy or effusion.              PMH  Severe coronary calcification seen on CT  Past Medical History:   Diagnosis Date     COPD (chronic obstructive pulmonary disease) (H)      Essential thrombocythemia (H) 2022     Heart disease         PSH    Past Surgical History:   Procedure Laterality Date     BIOPSY Left ?    breast bx from calcifications, benign     ORTHOPEDIC SURGERY  1967    Tendon repair Hand     THYROID SURGERY      left part of thyroid gland- age 22         Allergies   Allergen Reactions     Environmental [Adhesive Tape]      Wellbutrin [Bupropion]      Current Outpatient Medications   Medication     fluticasone (FLONASE) 50 MCG/ACT nasal spray     Fluticasone-Umeclidin-Vilanterol (TRELEGY ELLIPTA) 200-62.5-25 MCG/ACT oral inhaler     guaiFENesin (MUCINEX) 600 MG 12 hr tablet     hydroxyurea (HYDREA) 500 MG capsule     levalbuterol (XOPENEX HFA) 45 MCG/ACT inhaler     loratadine (CLARITIN) 10 MG tablet     rosuvastatin (CRESTOR) 20 MG tablet     No current facility-administered medications for this visit.     Social History     Tobacco Use     Smoking status: Former     Packs/day: 1.00     Years: 46.00     Pack years: 46.00     Types: Cigarettes     Start date: 1968     Quit date: 2014     Years since quittin.5     Smokeless tobacco: Never     Tobacco comments:     Quit in    Vaping Use     Vaping Use: Never used   Substance Use Topics     Alcohol use: Yes     Comment: rare- maybe once a  month     Drug use: No       Physical examination  BP (!) 153/94   Pulse 95   Temp 98.1  F (36.7  C) (Oral)   Resp 16   Wt 50.4 kg (111 lb 3.2 oz)   SpO2 95%   BMI 19.70 kg/m    Alert and oriented NAD  Bilateral breath sounds.     From a personal perspective, she comes to clinic with her .     IMPRESSION   72 year old female with VARSHA indeterminate pulmonary nodule.     Stage  [id number] by CT scan.     PLAN  I spent 30 min on the date of the encounter in chart review, patient visit, review of tests, documentation and/or discussion with other providers about the issues documented above. I reviewed the plan as follows:    I discussed at length with Kelsea her diagnosis and treatment options. I explained that given her low pulmonary reserve I would recommend SBRT. We can discuss if her nodule is amenable to percutaneous biopsy but I doubt it given the small size and apical location.     Necessary tests and appointments: PET scan, nodule conference (discuss biopsy), tumor board (discuss SBRT).     I appreciate the opportunity to participate in the care of your patient and will keep you updated.    Sincerely,    David Matthews MD

## 2023-07-20 ENCOUNTER — VIRTUAL VISIT (OUTPATIENT)
Dept: ONCOLOGY | Facility: CLINIC | Age: 73
End: 2023-07-20
Attending: INTERNAL MEDICINE
Payer: COMMERCIAL

## 2023-07-20 VITALS
WEIGHT: 111 LBS | DIASTOLIC BLOOD PRESSURE: 94 MMHG | BODY MASS INDEX: 19.67 KG/M2 | SYSTOLIC BLOOD PRESSURE: 153 MMHG | HEIGHT: 63 IN

## 2023-07-20 DIAGNOSIS — D47.3 ESSENTIAL THROMBOCYTHEMIA (H): Primary | ICD-10-CM

## 2023-07-20 DIAGNOSIS — R91.1 NODULE OF UPPER LOBE OF LEFT LUNG: ICD-10-CM

## 2023-07-20 PROCEDURE — 99215 OFFICE O/P EST HI 40 MIN: CPT | Mod: 95 | Performed by: INTERNAL MEDICINE

## 2023-07-20 ASSESSMENT — PAIN SCALES - GENERAL: PAINLEVEL: NO PAIN (0)

## 2023-07-20 NOTE — NURSING NOTE
Is the patient currently in the state of [place]? YES    Visit mode:VIDEO    If the visit is dropped, the patient can be reconnected by: VIDEO VISIT: Send to e-mail at: [email or web address]    Will anyone else be joining the visit? NO      How would you like to obtain your AVS? MyChart    Are changes needed to the allergy or medication list? NO    Reason for visit: Video Visit (Follow UP )      Enid Hutchins

## 2023-07-20 NOTE — LETTER
7/20/2023         RE: Sienna Bennett  [address]  [place] [address]        Dear Colleague,    Thank you for referring your patient, Sienna Bennett, to the [facility]. Please see a copy of my visit note below.    Virtual Visit Details    Type of service:  Video Visit     Originating Location (pt. Location): Home    Distant Location (provider location):  On-site  Platform used for Video Visit: [facility] Medical Oncology/Hematology Note       Date of visit: July 20, 2023  Outpatient Clinic Note        Assessment:     Essential thrombocythemia, approavching 22 years from the time of diagnosis, with stable platelet count on current dosing of hydroxyurea, 1000 mg p.o. Saturdays and Sundays, with 500 mg p.o. daily the rest of the week.   The chance of this becoming a major issue for her is extraordinarily low, though not zero.  There is a one in 20 chance of progressing to either myelofibrosis or an acute leukemia.  This is why I have continued  to see Kelsea and its been a pleasure to do so.  As has always been my concern, the major healthcare issue is related to her heavy smoking history. She has been struggling with worsening COPD. And now, she has an isolated suspicious lung nodule, concerning for primary lung cancer.  She has seen one of the surgeons here and I took the liberty of discussing this with another thoracic surgeon today, Dr. Palma.  Kelsea's case will be presented at the nodule conference.  Kelsea told me that Dr. Elias does not wish to do a biopsy because he is worried about a collapsed lung.  He thought that perhaps she could be referred specifically for primary radiotherapy, but I do not believe that this can be done without a biopsy.  Alternatively she could have the whole thing resected with a wedge or a lobectomy.  Apparently her lung function may not allow for this.  At any rate she is seeing our terrific lung cancer program and I will  follow along with her.  If she needs a medical oncologist, I work with some wonderful ones here at the [facility].  Multiple other psychosocial stressors discussed at length. Her 94 year old mother, who lives with she and Jason, has progressive dementia, and it certainly makes sense to get her into a memory care facility.        Plan:     Continue work-up per the thoracic surgeon regarding her nodule.  She will be presented at conference and a CT/PET scan will be obtained.    Continue current dosing of hydroxyurea  Continue current dosing of aspirin  Continue follow-up with Dr. Valle  Return to clinic with me in 6 months with a CBC as we continue in follow-up mode. Per Kelsea's request, we can continue with virtual visits.      Sanjay Lambert MD, MSc  Associate Professor of Medicine  [facility] [place] [address]  [phone]    __________________________________________________________________    CHIEF COMPLAINT     Essential thrombocythemia, diagnosed by bone marrow biopsy on 04/10/2001.  Kelsea has low risk features but has a significantly increased risk of thrombosis because of her long-term history of smoking.  She has quit for the last 4-5 years.  I have known Kelsea for 20 years and she had always refused to see a primary care provider, even though it was my assumption that she has COPD based on exam findings.  She may also have hypertension and hyperlipidemia, things I do not manage.  She finally established care with Dr. Kalli Valle last year.    History of Present Illness:     Hydroxyurea at varying doses.  She has been on this for 20 years.  Her current dose is 1000 mg p.o. daily on Saturdays and Sundays, with 500 mg daily the rest of the week  She also remains on a daily aspirin.      Interval history:     Kelsea is back for her visit  Heart scan showed calcifications. Saw cardiology who recommended she stay  "on ASA and rosuvastatin.  She is feeling about the same.  Has been going through a lung cancer work-up.  She did have a CT scan 3 months ago that showed a spiculated 6 mm left upper lobe pulmonary nodule, with growth by 2 mm on a repeat CT scan recently done.  She has seen Dr. Elias thoracic surgeon who feels that her lung function test may not be adequate for surgery.  He worries about the biopsy and thinks primary radiotherapy is indicated.  She will be presented at the upcoming lung nodule conference for further work-up.  Mom recently passed away at Kelsea's home.. She had required 2 pacemaker procedures and has struggled with recovery; Kelsea has had to provide a lot of support recently despite her mother moving into assisted living.  Her mother \"was done.\" This was very hard on Kelsea.  Discussed at length.  Recently found to have a pulmonary nodule, and has grown by 2-3 mm over 3 months.  Recommended for resection. Saw surgery yesterday and is not in agreement. Considering biopsy. Will be presented at lung conference  Has a 4 month round the world cruise scheduled from January to April. This is non negotiable and any plan on treatment for jazmyne must take this into account.          Past Medical History:   I have reviewed this patient's past medical history   Past Medical History:   Diagnosis Date    COPD (chronic obstructive pulmonary disease) (H) 2014    Essential thrombocythemia (H) 7/20/2022    Heart disease 2022          Past Surgical History:    I have reviewed this patient's past surgical history       Social History:   Tobacco, ETOH, and rec drugs reviewed and as noted below with the following exceptions:  Kelsea is  to Jason who is 15 years her senior, but has no biologic children.  She is originally from [place].  She has been traveling back and forth to [place] because her 93-year-old mother, who most recently lived with her and her  Jason, wanted to see family.  Kelsea's brothers think of her as a Saint. "  She would like them to share some of the burden of their mother's care.            Family History:     Family History   Problem Relation Age of Onset    Hypertension Mother     Hyperlipidemia Mother     Cerebrovascular Disease Father         At age 86    Prostate Cancer Father     Hypertension Brother     Other Cancer Brother         Bladder, Lung            Medications:     Current Outpatient Medications   Medication Sig Dispense Refill    fluticasone (FLONASE) 50 MCG/ACT nasal spray INSTILL 2 SPRAYS INTO BOTH NOSTRILS DAILY 48 mL 0    Fluticasone-Umeclidin-Vilanterol (TRELEGY ELLIPTA) 200-62.5-25 MCG/ACT oral inhaler Inhale 1 puff into the lungs daily 3 each 3    guaiFENesin (MUCINEX) 600 MG 12 hr tablet Take 600-1,200 mg by mouth 2 times daily      hydroxyurea (HYDREA) 500 MG capsule Take 1 capsule (500 mg) by mouth daily 5 days/week with 1000 mg the other 2 days 225 capsule 4    levalbuterol (XOPENEX HFA) 45 MCG/ACT inhaler INHALE 2 PUFFS INTO THE LUNGS EVERY 4 HOURS AS NEEDED FOR SHORTNESS OF BREATH / DYSPNEA OR WHEEZING 15 g 1    loratadine (CLARITIN) 10 MG tablet Take 10 mg by mouth daily      rosuvastatin (CRESTOR) 20 MG tablet Take 1 tablet (20 mg) by mouth daily 90 tablet 3              Physical Exam:   There were no vitals taken for this visit.    ECOG PS: 0  Constitutional: WDWN female in NAD, pleasant and appropriate  Neurologic: alert, answering questions appropriately, moving all extremities spontaneously. CN 2-12 grossly intact.  Psych: appropriate affect  No further exam could be done because this was a virtual visit  Data:      Component Ref Range & Units 5 d ago  (7/13/23) 6 mo ago  (1/12/23) 1 yr ago  (7/18/22) 1 yr ago  (4/19/22) 1 yr ago  (2/25/22) 1 yr ago  (1/17/22)    WBC Count 4.0 - 11.0 10e3/uL 4.1  4.8  5.2  5.5  5.0  3.7 Low      RBC Count 3.80 - 5.20 10e6/uL 3.60 Low   3.62 Low   3.65 Low   3.57 Low   3.63 Low   3.45 Low      Hemoglobin 11.7 - 15.7 g/dL 13.6  13.6  13.9  13.7  13.9   13.3     Hematocrit 35.0 - 47.0 % 41.5  41.2  40.2  41.2  42.1  40.5     MCV 78 - 100 fL 115 High   114 High   110 High   115 High   116 High   117 High      MCH 26.5 - 33.0 pg 37.8 High   37.6 High   38.1 High   38.4 High   38.3 High   38.6 High      MCHC 31.5 - 36.5 g/dL 32.8  33.0  34.6  33.3  33.0  32.8     RDW 10.0 - 15.0 % 12.8  12.9  12.4  12.0  12.2  12.8     Platelet Count 150 - 450 10e3/uL 332  399  490 High   479 High   485 High   421          No results found for this or any previous visit (from the past 24 hour(s)).    Other Data     CT Chest:  1. Continued increase in left upper lobe spiculated solid nodule now  measuring 8 mm is considered lung rads 4B. Consider contrast CT and/or  biopsy.    Labs, imaging and treatment plan reviewed with patient. All questions answered.        40 minutes spent on the date of the encounter doing chart review, review of outside records, review of test results, interpretation of tests, patient visit and documentation     Sincerely,        Sanjay Lambert MD

## 2023-07-28 ENCOUNTER — PATIENT OUTREACH (OUTPATIENT)
Dept: SURGERY | Facility: CLINIC | Age: 73
End: 2023-07-28
Payer: COMMERCIAL

## 2023-07-28 DIAGNOSIS — R91.8 PULMONARY NODULES: Primary | ICD-10-CM

## 2023-07-28 NOTE — PROGRESS NOTES
Called patient to update her on the recommendations from Tumor Board. Explained that Radiation Oncology would like her to have a PET scan prior to determining eligibility for SBRT without a diagnosis. Informed her that writer has reached out to scheduling to get PET scan expedited. Patient verbalized her understanding and agreement with plan. Expressed frustration as they have just made plans to travel to [place] this coming Sunday and will return the following Saturday. Writer will reach out to Scheduling to see if possible to get done prior to Sun. Patient aware that Scheduling will be contacting her.    Urgent Epic message sent to scheduling.     Rossana Neil RN, BSN  Thoracic Surgery RN Care Coordinator

## 2023-08-09 ENCOUNTER — HOSPITAL ENCOUNTER (OUTPATIENT)
Dept: PET IMAGING | Facility: CLINIC | Age: 73
Discharge: HOME | End: 2023-08-09
Attending: CLINICAL NURSE SPECIALIST | Admitting: CLINICAL NURSE SPECIALIST
Payer: COMMERCIAL

## 2023-08-09 DIAGNOSIS — R91.8 PULMONARY NODULES: ICD-10-CM

## 2023-08-09 LAB
CREAT BLD-MCNC: 1.4 MG/DL (ref 0.5–1)
GFR SERPL CREATININE-BSD FRML MDRD: 40 ML/MIN/1.73M2

## 2023-08-09 PROCEDURE — A9552 F18 FDG: HCPCS | Performed by: CLINICAL NURSE SPECIALIST

## 2023-08-09 PROCEDURE — 82565 ASSAY OF CREATININE: CPT

## 2023-08-09 PROCEDURE — 78815 PET IMAGE W/CT SKULL-THIGH: CPT | Mod: PI

## 2023-08-09 PROCEDURE — 74177 CT ABD & PELVIS W/CONTRAST: CPT

## 2023-08-09 PROCEDURE — 74177 CT ABD & PELVIS W/CONTRAST: CPT | Mod: 26 | Performed by: RADIOLOGY

## 2023-08-09 PROCEDURE — 78815 PET IMAGE W/CT SKULL-THIGH: CPT | Mod: 26 | Performed by: RADIOLOGY

## 2023-08-09 PROCEDURE — 71260 CT THORAX DX C+: CPT | Mod: 26 | Performed by: RADIOLOGY

## 2023-08-09 PROCEDURE — 250N000011 HC RX IP 250 OP 636: Performed by: CLINICAL NURSE SPECIALIST

## 2023-08-09 PROCEDURE — 343N000001 HC RX 343: Performed by: CLINICAL NURSE SPECIALIST

## 2023-08-09 RX ORDER — IOPAMIDOL 755 MG/ML
10-135 INJECTION, SOLUTION INTRAVASCULAR ONCE
Status: COMPLETED | OUTPATIENT
Start: 2023-08-09 | End: 2023-08-09

## 2023-08-09 RX ADMIN — IOPAMIDOL 62 ML: 755 INJECTION, SOLUTION INTRAVENOUS at 10:25

## 2023-08-09 RX ADMIN — FLUDEOXYGLUCOSE F-18 10.08 MILLICURIE: 500 INJECTION, SOLUTION INTRAVENOUS at 09:16

## 2023-08-16 ENCOUNTER — PATIENT OUTREACH (OUTPATIENT)
Dept: SURGERY | Facility: CLINIC | Age: 73
End: 2023-08-16
Payer: COMMERCIAL

## 2023-08-16 ENCOUNTER — TELEPHONE (OUTPATIENT)
Dept: FAMILY MEDICINE | Facility: CLINIC | Age: 73
End: 2023-08-16
Payer: COMMERCIAL

## 2023-08-16 DIAGNOSIS — S32.040A COMPRESSION FRACTURE OF L4 VERTEBRA, INITIAL ENCOUNTER (H): Primary | ICD-10-CM

## 2023-08-16 DIAGNOSIS — R91.8 PULMONARY NODULES: Primary | ICD-10-CM

## 2023-08-16 NOTE — TELEPHONE ENCOUNTER
Pt called in to req a ref to see a specialist for her back. She recently had a CT scan of her chest and it was noted that she has L4 Compression fracture. See below. She has had back pains since May when she was on a trip she had this cough and recalls having a coughing spell which then caused her to have back pains and at the time she thought it was muscle spasms in her back but is thinking this may have been when she injured her back. She called Nathalie Sexton APRN CNS  office because she can see below results on mychart but has not heard from them since her CT scan and they told her to follow up with her PCP regarding her back issues.

## 2023-08-16 NOTE — TELEPHONE ENCOUNTER
"Called patient to provide her with update from Multidisciplinary Tumor Board Conference yesterday. Informed her that Dr Li Mccall, Radiation Oncology, reviewed her recent PET scan and said that SBRT is feasible. Informed her that referral to Radiation Oncology has been placed and someone from their department will be contacting her to get her appointment scheduled. Patient verbalized her understanding and appreciated the update.   Informed writer that her brother passed 2 years ago from Lung Cancer and \"I just want this taken care of\".   Verbalized that she is eager to get treatment started.     Rossana Neil RN, BSN  Thoracic Surgery RN Care Coordinator    "

## 2023-08-16 NOTE — TELEPHONE ENCOUNTER
Referral placed for a spine providers.  will contact her to schedule.   Consider UC or clinic visit this week if more urgent appt needed for pain or any new neuro deficits

## 2023-08-16 NOTE — TELEPHONE ENCOUNTER
RN called patient and relayed provider message below. Patient verbalized good understanding. No further questions or concerns.    YENI Sheriff  [facility] T[facility]

## 2023-08-17 ENCOUNTER — PATIENT OUTREACH (OUTPATIENT)
Dept: ONCOLOGY | Facility: CLINIC | Age: 73
End: 2023-08-17
Payer: COMMERCIAL

## 2023-08-17 NOTE — PROGRESS NOTES
New Patient Oncology Nurse Navigator Note     Referring provider: Nathalie Sexton APRN [facility]    Referred to (specialty): Radiation Oncology    Requested provider (if applicable): Dr. Li Mccall     Date Referral Received: 8/16/2023     Evaluation for : VARSHA indeterminate pulmonary nodule.  Poor pulmonary reserve, SBRT was recommended. Discussed at Tumor Board. After reviewing PET scan, Dr Li Mccall agreed that SBRT is feasible.     Clinical History (per Nurse review of records provided):    **BOOK MARKED**   NOTES:  8/14, 7/25 & 7/21/2023:  Team conference  7/19/2023:  Dr. Elias, thoracic surgery consult    IMAGING:  ~multiple    PATHOLOGY:  none    Clinical Assessment / Barriers to Care (Per Nurse): none noted       Records Location (Care Everywhere, Media, etc.): [facility]     Records Needed: NONE & NO previous radiation    Additional testing needed prior to consult: none

## 2023-08-18 NOTE — TELEPHONE ENCOUNTER
Referring Provider/Location:  [facility]  Dx and Code: R91.8 (ICD-10-CM) - Pulmonary nodules  Appt Date:  8.22.23   Provider: Peter Gillis    August 18, 2023 10:26 AM TJ   Internal Referral, no previous radiation or outside records needed

## 2023-08-22 ENCOUNTER — OFFICE VISIT (OUTPATIENT)
Dept: RADIATION ONCOLOGY | Facility: CLINIC | Age: 73
End: 2023-08-22
Attending: CLINICAL NURSE SPECIALIST
Payer: COMMERCIAL

## 2023-08-22 ENCOUNTER — PRE VISIT (OUTPATIENT)
Dept: RADIATION ONCOLOGY | Facility: CLINIC | Age: 73
End: 2023-08-22

## 2023-08-22 VITALS
BODY MASS INDEX: 19.4 KG/M2 | WEIGHT: 109.5 LBS | SYSTOLIC BLOOD PRESSURE: 136 MMHG | HEART RATE: 86 BPM | OXYGEN SATURATION: 96 % | RESPIRATION RATE: 16 BRPM | DIASTOLIC BLOOD PRESSURE: 86 MMHG | TEMPERATURE: 97.8 F

## 2023-08-22 DIAGNOSIS — R91.8 PULMONARY NODULES: ICD-10-CM

## 2023-08-22 PROCEDURE — 99205 OFFICE O/P NEW HI 60 MIN: CPT | Performed by: SURGERY

## 2023-08-22 ASSESSMENT — PAIN SCALES - GENERAL: PAINLEVEL: MODERATE PAIN (5)

## 2023-08-22 NOTE — PROGRESS NOTES
Department of Radiation Oncology  [facility]  [facility]  [address] [place] [id number]  [phone]       Consultation Note    Name: Sienna Bennett MRN: [id number]   : 1950   Date of Service: Aug 22, 2023   Referring: Dr. Elias     Reason for consultation: Left upper lobe nodule, most consistent with malignancy     History of Present Illness     Ms. Bennett is a 72 year old female with poor lung function presenting with left upper lobe nodule, most consistent with a aF3pG4T0 malignancy.     Briefly, her oncologic history is as follows:    Patient initially underwent a screening CT scan on 4/3/2023 demonstrating a 7 mm left upper lobe apex nodule suspicious for malignancy.    She subsequently underwent a CT scan of the chest on 2023 which demonstrated slight progression in size now measuring 8 mm in size.  There is no evidence of any mediastinal or hilar adenopathy.    She was evaluated by Dr. Elias for consideration of biopsy/surgery.  However recent pulmonary function testing, with an FEV1 of 0.9, DLCO 65% along with her medical comorbidities including COPD and cardiac disease, she was not recommended surgical options.    Her case was discussed at thoracic tumor board with recommendation for biopsy was difficult to obtain given the size and location of nodule.    Thus a PET CT scan was obtained on 2023 which demonstrated interval growth to 1.1 cm of left apical solid pulmonary nodule with an SUV of 2.7, most consistent with a malignancy.  There is no evidence of any [place] or distant metastatic disease.    Her case was rediscussed at the respiratory board on 2023, with recommendation to proceed with SBRT.    Today, she denies chest pain, hemoptysis, oxygen requirement, intentional weight loss, focal neurologic changes. She does have some mild low back pain, and is seeing a spine specialist in the near  future.     Past Medical History:   Past Medical History:   Diagnosis Date    COPD (chronic obstructive pulmonary disease) (H) 2014    Essential thrombocythemia (H) 07/20/2022    Heart disease 2022    Lung mass 04/03/2023       Past Surgical History:   Past Surgical History:   Procedure Laterality Date    BIOPSY Left ?    breast bx from calcifications, benign    ORTHOPEDIC SURGERY  1967    Tendon repair Hand    THYROID SURGERY      left part of thyroid gland- age 22       Chemotherapy History:  No chemotherapy    Radiation History:  No radiation    Pregnant: No  Implanted Cardiac Devices: No    Medications:  Current Outpatient Medications   Medication    aspirin 81 MG EC tablet    fluticasone (FLONASE) 50 MCG/ACT nasal spray    Fluticasone-Umeclidin-Vilanterol (TRELEGY ELLIPTA) 200-62.5-25 MCG/ACT oral inhaler    guaiFENesin (MUCINEX) 600 MG 12 hr tablet    hydroxyurea (HYDREA) 500 MG capsule    levalbuterol (XOPENEX HFA) 45 MCG/ACT inhaler    loratadine (CLARITIN) 10 MG tablet    rosuvastatin (CRESTOR) 20 MG tablet     No current facility-administered medications for this visit.         Allergies:     Allergies   Allergen Reactions    Environmental [Adhesive Tape]     Wellbutrin [Bupropion]        Social History:  Tobacco: 45 years 1.5ppd , quit 2013  Alcohol: rare    Family History:  Family History   Problem Relation Age of Onset    Hypertension Mother     Hyperlipidemia Mother     Cerebrovascular Disease Father         At age 86    Prostate Cancer Father     Hypertension Brother     Bladder Cancer Brother     Lung Cancer Brother     Lymphoma Brother        Review of Systems   A 10-point review of systems was performed. Pertinent findings are noted in the HPI.    Physical Exam   ECOG Status: 1    Vitals:  /86 (BP Location: Left arm, Patient Position: Chair, Cuff Size: Adult Regular)   Pulse 86   Temp 97.8  F (36.6  C) (Oral)   Resp 16   Wt 49.7 kg (109 lb 8 oz)   SpO2 96%   BMI 19.40 kg/m      Gen:  Alert, in NAD  Head: NC/AT  Eyes: PERRL, EOMI, sclera anicteric  Ears: No external auricular lesions  Nose/sinus: No rhinorrhea or epistaxis  Oral cavity/oropharynx: MMM, no visible oral cavity lesions, FOM and BOT are soft to palpation  Neck: Full ROM, supple, no palpable adenopathy  Pulm: No wheezing, stridor or respiratory distress  CV: Extremities are warm and well-perfused, no cyanosis, no pedal edema  Abdominal: Normal bowel sounds, soft, nontender, no masses  Musculoskeletal: Normal bulk and tone  Skin: Normal color and turgor  Neuro: A/Ox3, CN II-XII intact, normal gait    Imaging/Path/Labs   Imaging: per HPI, personally reviewed and in agreement    PET/CT 8/9/23        Path: per HPI, personally reviewed and in agreement    Labs: per HPI, personally reviewed and in agreement    Assessment      Ms. Bennett is a 72 year old female with poor lung function presenting with left upper lobe nodule, most consistent with a [id number] malignancy.    In general, we discussed the management of early stage non-small cell lung cancer per NCCN guidelines. In patients with [id number] non-small cell lung cancer without evidence of mediastinal lymphadenopathy, medically fit patients may undergo surgical resection with lymph node evaluation, or if patients are medically inoperable, they may undergo SABR/SBRT (per RTOG 023; RTOG 0915) if node negative (N0) or definitive chemoradiation if N1.    Given patients pulmonary function testing, patient is not a  surgical candidate and has discussed this possibility with Dr Elias. Her case was discussed at thoracic tumor board, initially with plan for biopsy. However, given biopsy is not feasible at this time, recommendation to proceed with SBRT given smoking history, interval growth, and PET avidity.     Given central location of tumor, we discussed five fraction SBRT versus single fraction SBRT. We discussed the 5 fraction SBRT for central tumors is currently standard of care. We  discussed at great length the toxicity of SBRT for central lung tumors, including an approximate 10% change of death (Celestine et al., Central Toxicity, JCO, 2006; Bezremyk et al., RTOG 0813, JCO, 2019). If we cannot make OAR constraints, we discussed the potential option 50-70Gy/10fx (NCCN guidelines, James et al., MD Corley, IJROB, 2014).    Plan     After extensive discussion, patient wishes to proceed with SBRT to left upper nodule, most consistent with an early stage lung cancer.  CT simulation in the near future.  Plan for 50Gy/5fx.     All benefits and risks discussed, and patient is in agreement with the oncologic plan discussed above.     Thank you for allowing me to participate in your patient's care.  If you should require any additional information, please do not hesitate in contacting me.     Peter Gillis MD  Department of Radiation Oncology  [facility]

## 2023-08-22 NOTE — LETTER
2023         RE: Sienna Bennett  [address]  [facility] [address]        Dear Colleague,    Thank you for referring your patient, Sienna Bennett, to the [facility] RADIATION ONCOLOGY [place]. Please see a copy of my visit note below.       Department of Radiation Oncology  [facility]: Cancer Center  [facility]  [address] [place] [id number]  [phone]       Consultation Note    Name: Sienna Bennett MRN: [id number]   : 1950   Date of Service: Aug 22, 2023   Referring: Dr. Elias     Reason for consultation: Left upper lobe nodule, most consistent with malignancy     History of Present Illness     Ms. Bennett is a 72 year old female with poor lung function presenting with left upper lobe nodule, most consistent with a [id number] malignancy.     Briefly, her oncologic history is as follows:    Patient initially underwent a screening CT scan on 4/3/2023 demonstrating a 7 mm left upper lobe apex nodule suspicious for malignancy.    She subsequently underwent a CT scan of the chest on 2023 which demonstrated slight progression in size now measuring 8 mm in size.  There is no evidence of any mediastinal or hilar adenopathy.    She was evaluated by Dr. Elias for consideration of biopsy/surgery.  However recent pulmonary function testing, with an FEV1 of 0.9, DLCO 65% along with her medical comorbidities including COPD and cardiac disease, she was not recommended surgical options.    Her case was discussed at thoracic tumor board with recommendation for biopsy was difficult to obtain given the size and location of nodule.    Thus a PET CT scan was obtained on 2023 which demonstrated interval growth to 1.1 cm of left apical solid pulmonary nodule with an SUV of 2.7, most consistent with a malignancy.  There is no evidence of any [place] or distant metastatic disease.    Her case was rediscussed at the  respiratory board on 8/14/2023, with recommendation to proceed with SBRT.    Today, she denies chest pain, hemoptysis, oxygen requirement, intentional weight loss, focal neurologic changes. She does have some mild low back pain, and is seeing a spine specialist in the near future.     Past Medical History:   Past Medical History:   Diagnosis Date     COPD (chronic obstructive pulmonary disease) (H) 2014     Essential thrombocythemia (H) 07/20/2022     Heart disease 2022     Lung mass 04/03/2023       Past Surgical History:   Past Surgical History:   Procedure Laterality Date     BIOPSY Left ?    breast bx from calcifications, benign     ORTHOPEDIC SURGERY  1967    Tendon repair Hand     THYROID SURGERY      left part of thyroid gland- age 22       Chemotherapy History:  No chemotherapy    Radiation History:  No radiation    Pregnant: No  Implanted Cardiac Devices: No    Medications:  Current Outpatient Medications   Medication     aspirin 81 MG EC tablet     fluticasone (FLONASE) 50 MCG/ACT nasal spray     Fluticasone-Umeclidin-Vilanterol (TRELEGY ELLIPTA) 200-62.5-25 MCG/ACT oral inhaler     guaiFENesin (MUCINEX) 600 MG 12 hr tablet     hydroxyurea (HYDREA) 500 MG capsule     levalbuterol (XOPENEX HFA) 45 MCG/ACT inhaler     loratadine (CLARITIN) 10 MG tablet     rosuvastatin (CRESTOR) 20 MG tablet     No current facility-administered medications for this visit.         Allergies:     Allergies   Allergen Reactions     Environmental [Adhesive Tape]      Wellbutrin [Bupropion]        Social History:  Tobacco: 45 years 1.5ppd , quit 2013  Alcohol: rare    Family History:  Family History   Problem Relation Age of Onset     Hypertension Mother      Hyperlipidemia Mother      Cerebrovascular Disease Father         At age 86     Prostate Cancer Father      Hypertension Brother      Bladder Cancer Brother      Lung Cancer Brother      Lymphoma Brother        Review of Systems   A 10-point review of systems was performed.  Pertinent findings are noted in the HPI.    Physical Exam   ECOG Status: 1    Vitals:  /86 (BP Location: Left arm, Patient Position: Chair, Cuff Size: Adult Regular)   Pulse 86   Temp 97.8  F (36.6  C) (Oral)   Resp 16   Wt 49.7 kg (109 lb 8 oz)   SpO2 96%   BMI 19.40 kg/m      Gen: Alert, in NAD  Head: NC/AT  Eyes: PERRL, EOMI, sclera anicteric  Ears: No external auricular lesions  Nose/sinus: No rhinorrhea or epistaxis  Oral cavity/oropharynx: MMM, no visible oral cavity lesions, FOM and BOT are soft to palpation  Neck: Full ROM, supple, no palpable adenopathy  Pulm: No wheezing, stridor or respiratory distress  CV: Extremities are warm and well-perfused, no cyanosis, no pedal edema  Abdominal: Normal bowel sounds, soft, nontender, no masses  Musculoskeletal: Normal bulk and tone  Skin: Normal color and turgor  Neuro: A/Ox3, CN II-XII intact, normal gait    Imaging/Path/Labs   Imaging: per HPI, personally reviewed and in agreement    PET/CT 8/9/23        Path: per HPI, personally reviewed and in agreement    Labs: per HPI, personally reviewed and in agreement    Assessment      Ms. Bennett is a 72 year old female with poor lung function presenting with left upper lobe nodule, most consistent with a [id number] malignancy.    In general, we discussed the management of early stage non-small cell lung cancer per NCCN guidelines. In patients with [id number] non-small cell lung cancer without evidence of mediastinal lymphadenopathy, medically fit patients may undergo surgical resection with lymph node evaluation, or if patients are medically inoperable, they may undergo SABR/SBRT (per RTOG 023; RTOG 0915) if node negative (N0) or definitive chemoradiation if N1.    Given patients pulmonary function testing, patient is not a  surgical candidate and has discussed this possibility with Dr Elias. Her case was discussed at thoracic tumor board, initially with plan for biopsy. However, given biopsy is not  feasible at this time, recommendation to proceed with SBRT given smoking history, interval growth, and PET avidity.     Given central location of tumor, we discussed five fraction SBRT versus single fraction SBRT. We discussed the 5 fraction SBRT for central tumors is currently standard of care. We discussed at great length the toxicity of SBRT for central lung tumors, including an approximate 10% change of death (Celestine et al., Central Toxicity, JCO, 2006; Bezjak et al., RTOG 0813, JCO, 2019). If we cannot make OAR constraints, we discussed the potential option 50-70Gy/10fx (NCCN guidelines, Ramirez et al., MD Corley, IJROB, 2014).    Plan     After extensive discussion, patient wishes to proceed with SBRT to left upper nodule, most consistent with an early stage lung cancer.  CT simulation in the near future.  Plan for 50Gy/5fx.     All benefits and risks discussed, and patient is in agreement with the oncologic plan discussed above.     Thank you for allowing me to participate in your patient's care.  If you should require any additional information, please do not hesitate in contacting me.     Peter Gillis MD  Department of Radiation Oncology  [facility]       Again, thank you for allowing me to participate in the care of your patient.        Sincerely,        Peter Gillis MD

## 2023-08-22 NOTE — NURSING NOTE
INITIAL PATIENT ASSESSMENT      Diagnosis: lung mass    Prior radiation therapy: None    Prior chemotherapy: None    Prior hormonal therapy: No    Pain Eval:  Current history of pain associated with this visit:   Intensity: 5/10  Current: dull and aching  Location: low back  Treatment: patient denies use of medication management    Psychosocial  Living arrangements: lives at home in [place], presents to clinic with  Jason  Fall Risk: independent  MIPS Falls Risk Screening Completed: Yes Result: Negative   referral needs: Not needed    Advanced Directive: No, patient declined information packet  Implantable Cardiac Device: No  Authorization To Share Protected Health Information: YES - Date: 8/22/2023      Onset of menopause: patient reports menopause at age 52  Abnormal vaginal bleeding/discharge: No  Are you pregnant? No  Urine Pregnancy Testing Needed: No, post-menopausal per patient report    Review of Systems     Constitutional:  Positive for weight loss. Negative for chills, diaphoresis, fever and malaise/fatigue.        Patient reports loss of appetite with diagnosis and stress related to medical appointments along with recent death of her mother.  Active listening and support provided to patient.  Reviewed small, frequent meals, high-protein.   HENT:  Positive for hearing loss and tinnitus. Negative for congestion, ear discharge, ear pain, nosebleeds, sinus pain and sore throat.         Patient reports hearing loss and tinnitus at baseline, wears bilateral hearing aides.   Eyes: Negative.    Respiratory:  Positive for cough and sputum production. Negative for hemoptysis, shortness of breath, wheezing and stridor.         Patient reports intermittent productive cough related to COPD.  Denies use of supplemental oxygen, reports able to lay flat without difficulty.   Cardiovascular: Negative.    Gastrointestinal: Negative.    Genitourinary: Negative.    Musculoskeletal:  Positive for  back pain. Negative for falls, joint pain, myalgias and neck pain.        See pain note, patient relates to recent compression fracture on PET scan, reports she is scheduled to see a spine specialist in September.   Skin: Negative.    Neurological: Negative.    Endo/Heme/Allergies: Negative.    Psychiatric/Behavioral:  Negative for depression, hallucinations, memory loss, substance abuse and suicidal ideas. The patient is nervous/anxious. The patient does not have insomnia.      Nurse face-to-face time: Level 5:  over 15 min face to face time.    Li Crowe, RN BSN OCN CBCN

## 2023-08-22 NOTE — PATIENT INSTRUCTIONS
What to expect at your Simulation visit:    You will meet with a Radiation Therapist and other team members who will be doing a planning session called a  simulation  with you. This process will determine your daily treatment.    ~ You will lie on a flat table and have a treatment planning CT scan.  It is important during the scan to hold very still and breathe normally.    ~ Your therapist may construct a body mold to help you hold still for your treatments.    ~ If you are having treatment to the head or neck area you will be fitted with a plastic mesh mask that fits very snugly over your face and neck.     ~ Your therapist will be taking some digital photos that will go in your treatment chart.      ~Your therapist will make marks on your skin and take measurements. Your therapist may ask you about making small tattoos (a permanent small dot) over these marks.  These marks are used to position you daily for your radiation therapy treatments. Please do not wash off any marks until all of your radiation therapy treatments are complete unless you are instructed to do so by your therapist.    ~ Once the simulation is completed it can take from 3 to 10 business days before you start radiation therapy treatments.    ~ You may meet with a nurse who will go over management of treatment side effects and self care during your treatments. The nurse will help to plan care with other departments and physicians if needed.       Please contact [facility] RN with questions or concerns following today's appointment: [phone].       Please feel free to leave a detailed message if your call is not answered.    If your call is not received before 3:00 PM, it may not be returned until the following business day.    If you are receiving radiation treatment and need assistance after 3:00 PM or on the weekends, please call [phone] and ask to speak to the radiation oncologist on-call.    Thank  you!    Li JAIME

## 2023-08-23 DIAGNOSIS — J30.2 SEASONAL ALLERGIC RHINITIS, UNSPECIFIED TRIGGER: ICD-10-CM

## 2023-08-23 RX ORDER — FLUTICASONE PROPIONATE 50 MCG
SPRAY, SUSPENSION (ML) NASAL
Qty: 48 ML | Refills: 2 | Status: SHIPPED | OUTPATIENT
Start: 2023-08-23

## 2023-08-24 ENCOUNTER — APPOINTMENT (OUTPATIENT)
Dept: RADIATION ONCOLOGY | Facility: CLINIC | Age: 73
End: 2023-08-24
Payer: COMMERCIAL

## 2023-08-24 PROCEDURE — 77263 THER RADIOLOGY TX PLNG CPLX: CPT | Performed by: SURGERY

## 2023-08-24 PROCEDURE — 77290 THER RAD SIMULAJ FIELD CPLX: CPT | Performed by: SURGERY

## 2023-08-24 PROCEDURE — 77334 RADIATION TREATMENT AID(S): CPT | Performed by: SURGERY

## 2023-08-25 NOTE — TELEPHONE ENCOUNTER
SPINE PATIENTS - NEW PROTOCOL PREVISIT    RECORDS RECEIVED FROM: Internal   REASON FOR VISIT: Compression fracture of L4 vertebra, initial encounter (H) [S32.040A]    Date of Appt: 09/20/2023   NOTES (FOR ALL VISITS) STATUS DETAILS   OFFICE NOTE from referring provider Internal 08/16/2023 Dr Valle [facility]    OFFICE NOTE from other specialist N/A    DISCHARGE SUMMARY from hospital N/A    DISCHARGE REPORT from ER N/A    OPERATIVE REPORT N/A    EMG REPORT N/A    MEDICATION LIST N/A    IMAGING  (FOR ALL VISITS)     MRI (HEAD, NECK, SPINE) N/A    XRAY (SPINE) *NEUROSURGERY* N/A    CT (HEAD, NECK, SPINE) Internal 08/09/2023 chest abdomen   07/05/2023 chest

## 2023-09-01 ENCOUNTER — APPOINTMENT (OUTPATIENT)
Dept: RADIATION ONCOLOGY | Facility: CLINIC | Age: 73
End: 2023-09-01
Payer: COMMERCIAL

## 2023-09-01 PROCEDURE — 77293 RESPIRATOR MOTION MGMT SIMUL: CPT | Performed by: SURGERY

## 2023-09-01 PROCEDURE — 77295 3-D RADIOTHERAPY PLAN: CPT | Performed by: SURGERY

## 2023-09-01 PROCEDURE — 77470 SPECIAL RADIATION TREATMENT: CPT | Performed by: SURGERY

## 2023-09-01 PROCEDURE — 77300 RADIATION THERAPY DOSE PLAN: CPT | Performed by: SURGERY

## 2023-09-01 PROCEDURE — 77334 RADIATION TREATMENT AID(S): CPT | Performed by: SURGERY

## 2023-09-06 ENCOUNTER — APPOINTMENT (OUTPATIENT)
Dept: RADIATION ONCOLOGY | Facility: CLINIC | Age: 73
End: 2023-09-06
Payer: COMMERCIAL

## 2023-09-06 ENCOUNTER — ONCOLOGY VISIT (OUTPATIENT)
Dept: RADIATION ONCOLOGY | Facility: CLINIC | Age: 73
End: 2023-09-06

## 2023-09-06 PROCEDURE — 77373 STRTCTC BDY RAD THER TX DLVR: CPT | Performed by: SURGERY

## 2023-09-06 NOTE — LETTER
9/6/2023         RE: Sienna Bennett  [address]  [place] [address]        Dear Colleague,    Thank you for referring your patient, Sienna Bennett, to the [facility] RADIATION ONCOLOGY [place]. Please see a copy of my visit note below.    No notes on file    Again, thank you for allowing me to participate in the care of your patient.        Sincerely,        Peter Gillis MD

## 2023-09-07 NOTE — PROCEDURES
MERRITT GIBSON        MR#: [id number]        STEREOTACTIC BODY RADIOTHERAPY TREATMENT (SBRT) NOTE        DATE OF SERVICE:  9/06/2023            Diagnosis: C34.12  Malignant neoplasm of upper lobe, left bronchus or lung,cancer.  c T    N  M  .     Medical Indication for SBRT: The patient has medically inoperable Left Non-small cell    lung cancer due to poor pulmonary function.     Intent of SBRT: Curative     Narrative:  This is a treatment note for 1st fraction of the 5 fraction SBRT for left non-small cell     lung cancer.      The patient was positioned in the custom made immobilization in the Stereotactic body   frame allowing X, Y, Z coordinates to be verified. The patient had a cone beam CT scan   today prior to treatment to verify the new central axis coordinates.       The new XYZ coordinates has shifted to ? 0. 3  cm X(lateral), ? 0. 6 cm Y(longitudinal),   and  ? 0. 0 cm Z(vertical).  After the cone beam CT verifications, the CAX set up was on   the treatment machine (EleViacor), the patient had treatment delivered with 10 non-coplanar   beams. The patient had 1000 cGy delivered to the 85% isodose line with heterogeneity   corrections with a 6 MV photons. The cumulative dose is  1000 cGy of planned 5000   cGy.     No complications were encountered.  There were no complaint or skin reactions seen.   The patient tolerated the therapy well and left the department in stable condition and will   return for the 2nd fraction of total 5 on September 8, 2023.    The patient will continue with radiotherapy.     Peter Gillis M.D.   Department of Therapeutic Radiology

## 2023-09-08 ENCOUNTER — ONCOLOGY VISIT (OUTPATIENT)
Dept: RADIATION ONCOLOGY | Facility: CLINIC | Age: 73
End: 2023-09-08

## 2023-09-08 ENCOUNTER — APPOINTMENT (OUTPATIENT)
Dept: RADIATION ONCOLOGY | Facility: CLINIC | Age: 73
End: 2023-09-08
Payer: COMMERCIAL

## 2023-09-08 PROCEDURE — 77373 STRTCTC BDY RAD THER TX DLVR: CPT | Performed by: SURGERY

## 2023-09-08 NOTE — LETTER
9/8/2023         RE: Sienna Bennett  [address]  [place] [address]        Dear Colleague,    Thank you for referring your patient, Sienna Bennett, to the [facility] RADIATION ONCOLOGY [place]. Please see a copy of my visit note below.    No notes on file    Again, thank you for allowing me to participate in the care of your patient.        Sincerely,        Peter Gillis MD

## 2023-09-08 NOTE — PROGRESS NOTES
[facility] Dermatology Note  Encounter Date: Sep 13, 2023  Office Visit      Dermatology Problem List:  1. History of AK  - cryo   2. HX NMSC  - SCCIS -L gluteal cleft -  ED&C 8/31/22    Social: , going on a cruise this upcoming Spring  ____________________________________________    Assessment & Plan:  # History of nonmelanoma skin cancer, no clincial evidence of recurrence.   - Sunscreen: Apply 20 minutes prior to going outdoors and reapply every two hours, when wet or sweating. We recommend using an SPF 30 or higher, and to use one that is water resistant.     - Advised to monitor for changing, non-healing, bleeding, painful, changing, or otherwise symptomatic lesions  - Continue bi-annual skin exams    # Benign findings: multiple benign nevi, lentigines, cherry angiomas, SKs  - edu on benign etiology  - Signs and Symptoms of non-melanoma skin cancer and ABCDEs of melanoma reviewed with patient. Patient encouraged to perform monthly self skin exams and educated on how to perform them. UV precautions reviewed with patient. Patient was asked about new or changing moles/lesions on body.   - Sunscreen: Apply 20 minutes prior to going outdoors and reapply every two hours, when wet or sweating. We recommend using an SPF 30 or higher, and to use one that is water resistant.     - RTC for changes    # Actinic keratosis. X2 - upper cutaneous lip, L eyebrow  - Cryotherapy performed today, see procedure note below.    Procedures Performed:   - Cryotherapy procedure note, location(s): see above. After verbal consent and discussion of risks and benefits including, but not limited to, dyspigmentation/scar, blister, and pain, 2 lesion(s) was(were) treated with 1-2 mm freeze border for 1-2 cycles with liquid nitrogen. Post cryotherapy instructions were provided.     Follow-up: 6mo-1 year(s) in-person, or earlier for new or changing lesions    Staff:     All risks, benefits and alternatives were  discussed with patient.  Patient is in agreement and understands the assessment and plan.  All questions were answered.    Amber Baumann PA-C, MPAS  [facility] [place]: Phone: [phone], Fax: [phone]  [facility]: Phone: [phone],  Fax: [phone]  [facility]: Phone: [phone], Fax: [phone]  ____________________________________________    CC: No chief complaint on file.      HPI:  Ms. Sienna Bennett is a 72 year old female who presents today as a return patient for FBSE. Hx SCCIS. Has a scaly spot on the upper lip she is wondering about.     Patient is otherwise feeling well, without additional concerns.    Labs:  none    Physical Exam:  Vitals: There were no vitals taken for this visit.  SKIN: Full skin, which includes the head/face, both arms, chest, back, abdomen,both legs, genitalia and/or groin buttocks, digits and/or nails, was examined.   - Johnson's skin type II, <100 nevi  - There are dome shaped bright red papules on the trunk.   - Multiple regular brown pigmented macules and papules are identified on the trunk and extremities.   - Scattered brown macules on sun exposed areas.  - There are waxy stuck on tan to brown papules on the trunk.   - There is no erythema, telangectasias, nodularity, or pigmentation on the L gluteal cleft..   - There are erythematous macules with overyling adherent scale on the  L eyebrow and upper cutaneous lip x2 total.   - There is no erythema, telangectasias, nodularity, or pigmentation on the L gluteal cleft.   - No other lesions of concern on areas examined.     Medications:  Current Outpatient Medications   Medication    aspirin 81 MG EC tablet    fluticasone (FLONASE) 50 MCG/ACT nasal spray    Fluticasone-Umeclidin-Vilanterol (TRELEGY ELLIPTA) 200-62.5-25 MCG/ACT oral inhaler    guaiFENesin (MUCINEX) 600 MG 12 hr tablet    hydroxyurea  (HYDREA) 500 MG capsule    levalbuterol (XOPENEX HFA) 45 MCG/ACT inhaler    loratadine (CLARITIN) 10 MG tablet    rosuvastatin (CRESTOR) 20 MG tablet     No current facility-administered medications for this visit.      Past Medical/Surgical History:   Patient Active Problem List   Diagnosis    Abnormal TSH    Impaired fasting glucose    Hyperlipidemia LDL goal <100    Chronic obstructive pulmonary disease, unspecified COPD type (H)    Abnormal CT scan, chest    Essential thrombocythemia (H)    Herpes simplex virus infection    Lung nodules     Past Medical History:   Diagnosis Date    COPD (chronic obstructive pulmonary disease) (H) 2014    Essential thrombocythemia (H) 07/20/2022    Heart disease 2022    Lung mass 04/03/2023

## 2023-09-09 NOTE — PROCEDURES
MERRITT GIBSON        MR#: [id number]        STEREOTACTIC BODY RADIOTHERAPY TREATMENT (SBRT) NOTE        DATE OF SERVICE:  9/08/2023            Diagnosis: C34.12  Malignant neoplasm of upper lobe, left bronchus or lung,cancer.  c T    N  M  .     Medical Indication for SBRT: The patient has medically inoperable Left Non-small cell    lung cancer due to poor pulmonary function.     Intent of SBRT: Curative     Narrative:  This is a treatment note for 2nd fraction of the 5 fraction SBRT for left non-small cell     lung cancer.      The patient was positioned in the custom made immobilization in the Stereotactic body   frame allowing X, Y, Z coordinates to be verified. The patient had a cone beam CT scan   today prior to treatment to verify the new central axis coordinates.       The new XYZ coordinates has shifted to ? 3.   cm X(lateral), ? 2.  cm Y(longitudinal),   and  ? 2.  cm Z(vertical).  After the cone beam CT verifications, the CAX set up was on   the treatment machine (EleAcacia), the patient had treatment delivered with 10 non-coplanar   beams. The patient had 1000 cGy delivered to the 85% isodose line with heterogeneity   corrections with a 6 MV photons. The cumulative dose is  2000 cGy of planned 5000   cGy.     No complications were encountered.  There were no complaint or skin reactions seen.   The patient tolerated the therapy well and left the department in stable condition and will   return for the 3rd fraction of total 5 on September 11, 2023.    The patient will continue with radiotherapy.     Peter Gillis M.D.   Department of Therapeutic Radiology

## 2023-09-11 ENCOUNTER — ONCOLOGY VISIT (OUTPATIENT)
Dept: RADIATION ONCOLOGY | Facility: CLINIC | Age: 73
End: 2023-09-11

## 2023-09-11 ENCOUNTER — APPOINTMENT (OUTPATIENT)
Dept: RADIATION ONCOLOGY | Facility: CLINIC | Age: 73
End: 2023-09-11
Payer: COMMERCIAL

## 2023-09-11 PROCEDURE — 77373 STRTCTC BDY RAD THER TX DLVR: CPT | Performed by: RADIOLOGY

## 2023-09-11 NOTE — LETTER
9/11/2023         RE: Sienna Bennett  [address]  [place] [address]        Dear Colleague,    Thank you for referring your patient, Sienna Bennett, to the [facility] RADIATION ONCOLOGY [place]. Please see a copy of my visit note below.    No notes on file    Again, thank you for allowing me to participate in the care of your patient.        Sincerely,        Peter Gillis MD

## 2023-09-13 ENCOUNTER — OFFICE VISIT (OUTPATIENT)
Dept: RADIATION ONCOLOGY | Facility: CLINIC | Age: 73
End: 2023-09-13
Payer: COMMERCIAL

## 2023-09-13 ENCOUNTER — APPOINTMENT (OUTPATIENT)
Dept: RADIATION ONCOLOGY | Facility: CLINIC | Age: 73
End: 2023-09-13
Payer: COMMERCIAL

## 2023-09-13 ENCOUNTER — OFFICE VISIT (OUTPATIENT)
Dept: DERMATOLOGY | Facility: CLINIC | Age: 73
End: 2023-09-13
Payer: COMMERCIAL

## 2023-09-13 VITALS
SYSTOLIC BLOOD PRESSURE: 138 MMHG | OXYGEN SATURATION: 96 % | HEART RATE: 104 BPM | DIASTOLIC BLOOD PRESSURE: 83 MMHG | TEMPERATURE: 97.6 F | BODY MASS INDEX: 19.54 KG/M2 | RESPIRATION RATE: 18 BRPM | WEIGHT: 110.3 LBS

## 2023-09-13 DIAGNOSIS — D18.01 CHERRY ANGIOMA: ICD-10-CM

## 2023-09-13 DIAGNOSIS — L81.4 LENTIGINES: ICD-10-CM

## 2023-09-13 DIAGNOSIS — D22.9 MULTIPLE BENIGN NEVI: ICD-10-CM

## 2023-09-13 DIAGNOSIS — R91.8 PULMONARY NODULES: Primary | ICD-10-CM

## 2023-09-13 DIAGNOSIS — L82.1 SEBORRHEIC KERATOSES: ICD-10-CM

## 2023-09-13 DIAGNOSIS — Z85.828 HISTORY OF NONMELANOMA SKIN CANCER: Primary | ICD-10-CM

## 2023-09-13 DIAGNOSIS — C34.12 PRIMARY MALIGNANT NEOPLASM OF LEFT UPPER LOBE OF LUNG (H): ICD-10-CM

## 2023-09-13 DIAGNOSIS — L57.0 ACTINIC KERATOSIS: ICD-10-CM

## 2023-09-13 PROCEDURE — 99207 PR DROP WITH A PROCEDURE: CPT | Performed by: SURGERY

## 2023-09-13 PROCEDURE — 17000 DESTRUCT PREMALG LESION: CPT | Performed by: PHYSICIAN ASSISTANT

## 2023-09-13 PROCEDURE — 17003 DESTRUCT PREMALG LES 2-14: CPT | Performed by: PHYSICIAN ASSISTANT

## 2023-09-13 PROCEDURE — 77373 STRTCTC BDY RAD THER TX DLVR: CPT | Performed by: SURGERY

## 2023-09-13 PROCEDURE — 99213 OFFICE O/P EST LOW 20 MIN: CPT | Mod: 25 | Performed by: PHYSICIAN ASSISTANT

## 2023-09-13 ASSESSMENT — PAIN SCALES - GENERAL: PAINLEVEL: NO PAIN (0)

## 2023-09-13 NOTE — LETTER
2023         RE: Sienna Bennett  [address]  [place] [address]        Dear Colleague,    Thank you for referring your patient, Sienna Bennett, to the [facility] RADIATION ONCOLOGY [place]. Please see a copy of my visit note below.    [facility] PHYSICIANS  SPECIALIZING IN BREAKTHROUGHS  Radiation Oncology    On Treatment Visit Note      Sienna Bennett      Date: Sep 13, 2023   MRN: [id number]   : 1950  Diagnosis: lung mass        ID: Ms. Bennett is a 72 year old female with poor lung function presenting with left upper lobe nodule, most consistent with a [id number] malignancy. Plan for SBRT to [facility].     Reason for Visit:  On Radiation Treatment Visit       Treatment Summary to Date  Treatment Site: [facility] - SBRT Current Dose: 4000/5000 cGy Fractions: 4/5        Chemotherapy  Chemo concurrent with radx?: No    Subjective:   No complaints, tolerating RT well overall.    Nursing ROS:   Nutrition Alteration  Diet Type: Patient's Preference  Skin  Skin Reaction: 0 - No changes        Cardiovascular  Respiratory effort: 1 - Normal - without distress        Psychosocial  Mood - Anxiety: 0 - Normal  Mood - Depression: 0 - Normal  Psychosocial Note: energy level at baseline  Pain Assessment  0-10 Pain Scale: 0      Objective:   /83 (BP Location: Left arm, Patient Position: Chair, Cuff Size: Adult Regular)   Pulse 104   Temp 97.6  F (36.4  C) (Oral)   Resp 18   Wt 50 kg (110 lb 4.8 oz)   SpO2 96%   BMI 19.54 kg/m    Gen: Appears well, in no acute distress  Skin: No erythema  CV/Resp: rrr, breathing comfortably on room air  Neuro: CN 2-12 grossly intact, UE/LE full strength     Labs:  CBC RESULTS:   Recent Labs   Lab Test 23  1019   WBC 4.1   RBC 3.60*   HGB 13.6   HCT 41.5   *   MCH 37.8*   MCHC 32.8   RDW 12.8        ELECTROLYTES:  Recent Labs   Lab Test 23  0920 23  1018 22  0829   NA  --   --  139    POTASSIUM  --   --  4.8   CHLORIDE  --   --  104   KAYLAN  --   --  9.7   CO2  --   --  28   BUN  --   --  22   CR 1.4*  --  0.87   GLC  --  103* 100*       Assessment:    Tolerating radiation therapy well.  All questions and concerns addressed.      Plan:   Continue current therapy.        Mosaiq chart and setup information reviewed  Ports checked and MVCT/IGRT images checked    Medication Review  Med list reviewed with patient?: Yes  Med list printed and given: Offered and declined    Educational Topic Discussed  Education Instructions: return in six weeks for post-radiation treatment visit with Dr. Gillis - scheduling to contact patient      Peter Gillis MD  Radiation Oncology   [facility]: [phone]        Again, thank you for allowing me to participate in the care of your patient.        Sincerely,        Peter Gillis MD

## 2023-09-13 NOTE — PROCEDURES
MERRITT GIBSON        MR#: [id number]        STEREOTACTIC BODY RADIOTHERAPY TREATMENT (SBRT) NOTE        DATE OF SERVICE:  9/11/2023            Diagnosis: C34.12  Malignant neoplasm of upper lobe, left bronchus or lung,cancer.  c T    N  M  .     Medical Indication for SBRT: The patient has medically inoperable Left Non-small cell    lung cancer due to poor pulmonary function.     Intent of SBRT: Curative     Narrative:  This is a treatment note for 3rd fraction of the 5 fraction SBRT for left non-small cell     lung cancer.      The patient was positioned in the custom made immobilization in the Stereotactic body   frame allowing X, Y, Z coordinates to be verified. The patient had a cone beam CT scan   today prior to treatment to verify the new central axis coordinates.       The new XYZ coordinates has shifted to ? 0. 7  cm X(lateral), ? 0.3  cm Y(longitudinal),   and  ? 0.2 cm Z(vertical).  After the cone beam CT verifications, the CAX set up was on   the treatment machine ([employer]), the patient had treatment delivered with 10 non-coplanar   beams. The patient had 1000 cGy delivered to the 85% isodose line with heterogeneity   corrections with a 6 MV photons. The cumulative dose is  2000 cGy of planned 5000   cGy.     No complications were encountered.  There were no complaint or skin reactions seen.   The patient tolerated the therapy well and left the department in stable condition and will   return for the 4th fraction of total 5 on September 13, 2023.    The patient will continue with radiotherapy.     Allie Myers M.D.   Department of Therapeutic Radiology

## 2023-09-13 NOTE — LETTER
9/13/2023         RE: Sienna Bennett  [address]  [place] [address]        Dear Colleague,    Thank you for referring your patient, Sienna Bennett, to the [facility]. Please see a copy of my visit note below.    [facility] Dermatology Note  Encounter Date: Sep 13, 2023  Office Visit      Dermatology Problem List:  1. History of AK  - cryo   2. HX NMSC  - SCCIS -L gluteal cleft -  ED&C 8/31/22    Social: , going on a cruise this upcoming Spring  ____________________________________________    Assessment & Plan:  # History of nonmelanoma skin cancer, no clincial evidence of recurrence.   - Sunscreen: Apply 20 minutes prior to going outdoors and reapply every two hours, when wet or sweating. We recommend using an SPF 30 or higher, and to use one that is water resistant.     - Advised to monitor for changing, non-healing, bleeding, painful, changing, or otherwise symptomatic lesions  - Continue bi-annual skin exams    # Benign findings: multiple benign nevi, lentigines, cherry angiomas, SKs  - edu on benign etiology  - Signs and Symptoms of non-melanoma skin cancer and ABCDEs of melanoma reviewed with patient. Patient encouraged to perform monthly self skin exams and educated on how to perform them. UV precautions reviewed with patient. Patient was asked about new or changing moles/lesions on body.   - Sunscreen: Apply 20 minutes prior to going outdoors and reapply every two hours, when wet or sweating. We recommend using an SPF 30 or higher, and to use one that is water resistant.     - RTC for changes    # Actinic keratosis. X2 - upper cutaneous lip, L eyebrow  - Cryotherapy performed today, see procedure note below.    Procedures Performed:   - Cryotherapy procedure note, location(s): see above. After verbal consent and discussion of risks and benefits including, but not limited to, dyspigmentation/scar, blister, and pain, 2 lesion(s)  was(were) treated with 1-2 mm freeze border for 1-2 cycles with liquid nitrogen. Post cryotherapy instructions were provided.     Follow-up: 6mo-1 year(s) in-person, or earlier for new or changing lesions    Staff:     All risks, benefits and alternatives were discussed with patient.  Patient is in agreement and understands the assessment and plan.  All questions were answered.    Amber Baumann PA-C, MPAS  [facility]: Phone: [phone], Fax: [phone]  [facility]: Phone: [phone],  Fax: [phone]  [facility]: Phone: [phone], Fax: [phone]  ____________________________________________    CC: No chief complaint on file.      HPI:  Ms. Sienna Bennett is a 72 year old female who presents today as a return patient for FBSE. Hx SCCIS. Has a scaly spot on the upper lip she is wondering about.     Patient is otherwise feeling well, without additional concerns.    Labs:  none    Physical Exam:  Vitals: There were no vitals taken for this visit.  SKIN: Full skin, which includes the head/face, both arms, chest, back, abdomen,both legs, genitalia and/or groin buttocks, digits and/or nails, was examined.   - Johnson's skin type II, <100 nevi  - There are dome shaped bright red papules on the trunk.   - Multiple regular brown pigmented macules and papules are identified on the trunk and extremities.   - Scattered brown macules on sun exposed areas.  - There are waxy stuck on tan to brown papules on the trunk.   - There is no erythema, telangectasias, nodularity, or pigmentation on the L gluteal cleft..   - There are erythematous macules with overyling adherent scale on the  L eyebrow and upper cutaneous lip x2 total.   - There is no erythema, telangectasias, nodularity, or pigmentation on the L gluteal cleft.   - No other lesions of concern on areas examined.     Medications:  Current  Outpatient Medications   Medication     aspirin 81 MG EC tablet     fluticasone (FLONASE) 50 MCG/ACT nasal spray     Fluticasone-Umeclidin-Vilanterol (TRELEGY ELLIPTA) 200-62.5-25 MCG/ACT oral inhaler     guaiFENesin (MUCINEX) 600 MG 12 hr tablet     hydroxyurea (HYDREA) 500 MG capsule     levalbuterol (XOPENEX HFA) 45 MCG/ACT inhaler     loratadine (CLARITIN) 10 MG tablet     rosuvastatin (CRESTOR) 20 MG tablet     No current facility-administered medications for this visit.      Past Medical/Surgical History:   Patient Active Problem List   Diagnosis     Abnormal TSH     Impaired fasting glucose     Hyperlipidemia LDL goal <100     Chronic obstructive pulmonary disease, unspecified COPD type (H)     Abnormal CT scan, chest     Essential thrombocythemia (H)     Herpes simplex virus infection     Lung nodules     Past Medical History:   Diagnosis Date     COPD (chronic obstructive pulmonary disease) (H) 2014     Essential thrombocythemia (H) 07/20/2022     Heart disease 2022     Lung mass 04/03/2023                        Again, thank you for allowing me to participate in the care of your patient.        Sincerely,        Amber Baumann PA-C

## 2023-09-13 NOTE — PROGRESS NOTES
[facility] PHYSICIANS  SPECIALIZING IN BREAKTHROUGHS  Radiation Oncology    On Treatment Visit Note      Sienna Bennett      Date: Sep 13, 2023   MRN: [id number]   : 1950  Diagnosis: lung mass        ID: Ms. Bennett is a 72 year old female with poor lung function presenting with left upper lobe nodule, most consistent with a [id number] malignancy. Plan for SBRT to [facility].     Reason for Visit:  On Radiation Treatment Visit       Treatment Summary to Date  Treatment Site: [facility] - SBRT Current Dose: 4000/5000 cGy Fractions: 4/5        Chemotherapy  Chemo concurrent with radx?: No    Subjective:   No complaints, tolerating RT well overall.    Nursing ROS:   Nutrition Alteration  Diet Type: Patient's Preference  Skin  Skin Reaction: 0 - No changes        Cardiovascular  Respiratory effort: 1 - Normal - without distress        Psychosocial  Mood - Anxiety: 0 - Normal  Mood - Depression: 0 - Normal  Psychosocial Note: energy level at baseline  Pain Assessment  0-10 Pain Scale: 0      Objective:   /83 (BP Location: Left arm, Patient Position: Chair, Cuff Size: Adult Regular)   Pulse 104   Temp 97.6  F (36.4  C) (Oral)   Resp 18   Wt 50 kg (110 lb 4.8 oz)   SpO2 96%   BMI 19.54 kg/m    Gen: Appears well, in no acute distress  Skin: No erythema  CV/Resp: rrr, breathing comfortably on room air  Neuro: CN 2-12 grossly intact, UE/LE full strength     Labs:  CBC RESULTS:   Recent Labs   Lab Test 23  1019   WBC 4.1   RBC 3.60*   HGB 13.6   HCT 41.5   *   MCH 37.8*   MCHC 32.8   RDW 12.8        ELECTROLYTES:  Recent Labs   Lab Test 23  0920 23  1018 22  0829   NA  --   --  139   POTASSIUM  --   --  4.8   CHLORIDE  --   --  104   KAYLAN  --   --  9.7   CO2  --   --  28   BUN  --   --  22   CR 1.4*  --  0.87   GLC  --  103* 100*       Assessment:    Tolerating radiation therapy well.  All questions and concerns addressed.      Plan:   Continue current therapy.         Mosaiq chart and setup information reviewed  Ports checked and MVCT/IGRT images checked    Medication Review  Med list reviewed with patient?: Yes  Med list printed and given: Offered and declined    Educational Topic Discussed  Education Instructions: return in six weeks for post-radiation treatment visit with Dr. Gillis - scheduling to contact patient      Peter Gillis MD  Radiation Oncology   [facility]  Clinic: [phone]

## 2023-09-13 NOTE — NURSING NOTE
Sienna Bennett's goals for this visit include:   Chief Complaint   Patient presents with    Skin Check     Full body skin check. No spots of concern. Personal history of SCCIS.       She requests these members of her care team be copied on today's visit information:     PCP: Kalli Valle    Referring Provider:  Referred Self, MD  No address on file    There were no vitals taken for this visit.    Do you need any medication refills at today's visit? Karen Esparza CMA

## 2023-09-13 NOTE — PATIENT INSTRUCTIONS
Please contact [facility] RN with questions or concerns following today's appointment: [phone].       Please feel free to leave a detailed message if your call is not answered.    If your call is not received before 3:00 PM, it may not be returned until the following business day.    If you are receiving radiation treatment and need assistance after 3:00 PM or on the weekends, please call [phone] and ask to speak to the radiation oncologist on-call.    Thank you!    Li JAIME

## 2023-09-13 NOTE — PROGRESS NOTES
"    SUBJECTIVE:  HPI:  Sienna Bennett  Is a 72 year old female who presents today for new patient evaluation of low back pain L4 compression fracture upon referral from Dr. Kalli Valle based on an 8/16/2023 phone call the recording:  \"Pt called in to req a ref to see a specialist for her back. She recently had a CT scan of her chest and it was noted that she has L4 Compression fracture. See below. She has had back pains since May when she was on a trip she had this cough and recalls having a coughing spell which then caused her to have back pains and at the time she thought it was muscle spasms in her back but is thinking this may have been when she injured her back. She called Nathalie Sexton APRN CNS  office because she can see below results on mychart but has not heard from them since her CT scan and they told her to follow up with her PCP regarding her back issues.\"      History today:  Kelsea is here with her  Jason assisting with the history.  She confirms the above.  The onset of pain was sudden with a cough actually 4 months ago or a little longer.  She has plateaued in her progress but she is definitely better now than she was in the first month.  Turning over in bed sometimes brings on the pain..  Pain is now intermittent and is brought on by twisting and bending.  There is no radiating pain in the legs, no numbness tingling or weakness, no bowel or bladder dysfunction, and no saddle anesthesia.  Is been no change since her PET CT scan in terms of symptoms.  She has never had a back fracture before and she has known osteoporosis.  She has not been referred to the bone density people    Pain score and diagram reviewed.  See questionnaire.      ROS: .  Otherwise negative for bowel/bladder dysfunction, balance changes, headache, leg pain/numbness/weakness, fevers, chills, night sweats, unexplained weight loss;  otherwise unremarkable.   See the patient's intake questionnaire from " "today for details.    Treatment to Date: Nothing    MEDICATIONS:  Reviewed.    ALLERGIES:  Reviewed.     Past medical and surgical history:   Pertinent for COPD, ex smoker, essential thrombocythemia, lung mass-likely cancer, heart disease, status post radiation therapy to left upper lobe lung, thrombocythemia on baby aspirin    SOCIAL HX: She and her  have 2 children and 3 grandchildren.  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  Ports hobbies activities: Tennis until the COVID pandemic (3.5 [employer]), hip, \", former skier they are leaving for a cruise to the [facility] in mid October, and a 4-month-world cruise January 2024    OBJECTIVE:    IMAGING: Images and report reviewed.    PET and CT on 8/9/2023     Severe L4 compression fracture with associated inflammatory uptake.  Mild uptake along the superior compression deformity of T11 vertebral  body. Mild uptake in the soft tissues posterior to the L3-5 spinous  processes with mild stranding, likely inflammatory.     1. Mildly hypermetabolic 1.1 cm left apical solid pulmonary nodule,  which has gradually increased in size and new since chest CT dated  3/17/2022, concerning for primary pulmonary malignancy.     2. No evidence of virginia disease or distant metastasis.     3. Severe L4 compression fracture (favored osteoporotic) with  associated inflammatory uptake.     4. Additional ancillary findings as described in the body of the  report.       EXAMINATION:    --CONSTITUTIONAL:   No acute distress.  The patient is well nourished and well groomed.  She is in good physical condition and BMI is normal.  She transitions well without pushoff and moves fluidly about the room.  --SKIN:  Skin over the face, bilateral lower extremities, and posterior torso is clean, dry, intact without rashes.    --GAIT:  is non-antalgic. Flat foot, heel and toe walking:  normal   .  Squat and rise   normal    the last with pain.  --STANDING EXAMINATION:    Symmetry of spine/pelvis   " unremarkable   .      Range of motion globally limited secondary to midline low back pain at about L4.   Standing flexion   negative   .    Arturo's sign   negative    .     Stork test   negative    .   --NEUROLOGICAL:    SENSATION to light touch is intact in bilateral thighs, lower legs and feet.   REFLEXES:  patellar 2+, and achilles 1+.  Babinski is negative. No clonus.  MANUAL MOTOR TESTING:  L3- S1 Myotomes, Femoral, Obturator, Peroneal and Tibial nerves 5/5   DURAL STRETCH TESTS:  SLR negative.  Femoral Stretch Test not done.       ASSESSMENT: Sienna Bennett is a 72 year old female who presents  today for new patient evaluation of:    4 months out from L4 compression fracture      PLAN:  Consult with bone density clinic.  Consult with orthotics for a TLSO.  Consult with interventional radiology for kyphoplasty.  I did speak with the on-call nurse practitioner from  and they prefer an MRI prior to seeing her.  This will be ordered with Valium due to her claustrophobia.  She will talk to her hematologist regarding the aspirin prior to her procedure because of her history of thrombocythemia.      60 minutes of time spent doing chart review, history and exam, documentation, counseling, education, coordination of care, and other activities as described above.        Advised patient to call or return early if symptoms worsen, or having problems controlling bladder and bowel function or worsening leg weakness.     Please note: Voice recognition software was used in this dictation.  It may therefore contain typographical errors.    Solitario Ortiz MD

## 2023-09-14 NOTE — PROCEDURES
MERRITT GIBSON        MR#: [id number]        STEREOTACTIC BODY RADIOTHERAPY TREATMENT (SBRT) NOTE        DATE OF SERVICE:  9/13/2023            Diagnosis: C34.12  Malignant neoplasm of upper lobe, left bronchus or lung,cancer.  c T    N  M  .     Medical Indication for SBRT: The patient has medically inoperable Left Non-small cell    lung cancer due to poor pulmonary function.     Intent of SBRT: Curative     Narrative:  This is a treatment note for 4th fraction of the 5 fraction SBRT for left non-small cell     lung cancer.      The patient was positioned in the custom made immobilization in the Stereotactic body   frame allowing X, Y, Z coordinates to be verified. The patient had a cone beam CT scan   today prior to treatment to verify the new central axis coordinates.       The new XYZ coordinates has shifted to ? 0. 6  cm X(lateral), ? 0.1  cm Y(longitudinal),   and  ? 0.1 cm Z(vertical).  After the cone beam CT verifications, the CAX set up was on   the treatment machine ([employer]), the patient had treatment delivered with 10 non-coplanar   beams. The patient had 1000 cGy delivered to the 85% isodose line with heterogeneity   corrections with a 6 MV photons. The cumulative dose is  2000 cGy of planned 5000   cGy.     No complications were encountered.  There were no complaint or skin reactions seen.   The patient tolerated the therapy well and left the department in stable condition and will   return for the 5th fraction of total 5 on September 15, 2023.    The patient will continue with radiotherapy.     Peter Gillis M.D.   Department of Therapeutic Radiology

## 2023-09-15 ENCOUNTER — ONCOLOGY VISIT (OUTPATIENT)
Dept: RADIATION ONCOLOGY | Facility: CLINIC | Age: 73
End: 2023-09-15

## 2023-09-15 ENCOUNTER — APPOINTMENT (OUTPATIENT)
Dept: RADIATION ONCOLOGY | Facility: CLINIC | Age: 73
End: 2023-09-15
Payer: COMMERCIAL

## 2023-09-15 ENCOUNTER — DOCUMENTATION ONLY (OUTPATIENT)
Dept: RADIATION ONCOLOGY | Facility: CLINIC | Age: 73
End: 2023-09-15

## 2023-09-15 DIAGNOSIS — C34.12 PRIMARY MALIGNANT NEOPLASM OF LEFT UPPER LOBE OF LUNG (H): Primary | ICD-10-CM

## 2023-09-15 PROCEDURE — 77336 RADIATION PHYSICS CONSULT: CPT | Performed by: SURGERY

## 2023-09-15 PROCEDURE — 77373 STRTCTC BDY RAD THER TX DLVR: CPT | Performed by: SURGERY

## 2023-09-15 PROCEDURE — 99207 PR NO BILLABLE SERVICE THIS VISIT: CPT | Performed by: SURGERY

## 2023-09-15 PROCEDURE — 77435 SBRT MANAGEMENT: CPT | Performed by: SURGERY

## 2023-09-15 NOTE — LETTER
9/15/2023         RE: Sienna Bennett  [address]  [place] [address]        Dear Colleague,    Thank you for referring your patient, Sienna Bennett, to the [facility] RADIATION ONCOLOGY [place]. Please see a copy of my visit note below.    No notes on file    Again, thank you for allowing me to participate in the care of your patient.        Sincerely,        Peter Gillis MD

## 2023-09-16 NOTE — PROCEDURES
MERRITT GIBSON        MR#: [id number]        STEREOTACTIC BODY RADIOTHERAPY TREATMENT (SBRT) NOTE        DATE OF SERVICE:  9/15/2023            Diagnosis: C34.12  Malignant neoplasm of upper lobe, left bronchus or lung,cancer.  c T    N  M  .     Medical Indication for SBRT: The patient has medically inoperable Left Non-small cell    lung cancer due to poor pulmonary function.     Intent of SBRT: Curative     Narrative:  This is a treatment note for 5th fraction of the 5 fraction SBRT for left non-small cell     lung cancer.      The patient was positioned in the custom made immobilization in the Stereotactic body   frame allowing X, Y, Z coordinates to be verified. The patient had a cone beam CT scan   today prior to treatment to verify the new central axis coordinates.       The new XYZ coordinates has shifted to ? 0. 5  cm X(lateral), ? 0.1  cm Y(longitudinal),   and  ? 0.2 cm Z(vertical).  After the cone beam CT verifications, the CAX set up was on   the treatment machine ([employer]), the patient had treatment delivered with 10 non-coplanar   beams. The patient had 1000 cGy delivered to the 85% isodose line with heterogeneity   corrections with a 6 MV photons. The cumulative dose is 5000 cGy of planned 5000   cGy.     No complications were encountered.  There were no complaint or skin reactions seen.   The patient tolerated the therapy well and left the department in stable condition   The patient will return in 3 months with a CT scan     Peter Gillis M.D.   Department of Therapeutic Radiology

## 2023-09-20 ENCOUNTER — OFFICE VISIT (OUTPATIENT)
Dept: NEUROSURGERY | Facility: CLINIC | Age: 73
End: 2023-09-20
Attending: FAMILY MEDICINE
Payer: COMMERCIAL

## 2023-09-20 ENCOUNTER — PRE VISIT (OUTPATIENT)
Dept: NEUROSURGERY | Facility: CLINIC | Age: 73
End: 2023-09-20

## 2023-09-20 VITALS
WEIGHT: 110 LBS | BODY MASS INDEX: 19.49 KG/M2 | HEIGHT: 63 IN | DIASTOLIC BLOOD PRESSURE: 80 MMHG | HEART RATE: 93 BPM | SYSTOLIC BLOOD PRESSURE: 122 MMHG

## 2023-09-20 DIAGNOSIS — S32.040A COMPRESSION FRACTURE OF L4 VERTEBRA, INITIAL ENCOUNTER (H): ICD-10-CM

## 2023-09-20 DIAGNOSIS — M80.00XA AGE-RELATED OSTEOPOROSIS WITH CURRENT PATHOLOGICAL FRACTURE, INITIAL ENCOUNTER: Primary | ICD-10-CM

## 2023-09-20 DIAGNOSIS — F40.240 CLAUSTROPHOBIA: ICD-10-CM

## 2023-09-20 PROCEDURE — 99205 OFFICE O/P NEW HI 60 MIN: CPT | Performed by: PREVENTIVE MEDICINE

## 2023-09-20 RX ORDER — DIAZEPAM 2 MG
TABLET ORAL
Qty: 2 TABLET | Refills: 0 | Status: SHIPPED | OUTPATIENT
Start: 2023-09-20 | End: 2023-11-02

## 2023-09-20 ASSESSMENT — PAIN SCALES - GENERAL: PAINLEVEL: MILD PAIN (2)

## 2023-09-20 NOTE — LETTER
"    9/20/2023         RE: Sienna Bennett  [address]  [place] [address]        Dear Colleague,    Thank you for referring your patient, Sienna Bennett, to the [facility] [place]. Please see a copy of my visit note below.        SUBJECTIVE:  HPI:  Sienna Bennett  Is a 72 year old female who presents today for new patient evaluation of low back pain L4 compression fracture upon referral from Dr. Kalli Valle based on an 8/16/2023 phone call the recording:  \"Pt called in to req a ref to see a specialist for her back. She recently had a CT scan of her chest and it was noted that she has L4 Compression fracture. See below. She has had back pains since May when she was on a trip she had this cough and recalls having a coughing spell which then caused her to have back pains and at the time she thought it was muscle spasms in her back but is thinking this may have been when she injured her back. She called Nathalie Sexton APRN CNS  office because she can see below results on mychart but has not heard from them since her CT scan and they told her to follow up with her PCP regarding her back issues.\"      History today:  Kelsea is here with her  Jason assisting with the history.  She confirms the above.  The onset of pain was sudden with a cough actually 4 months ago or a little longer.  She has plateaued in her progress but she is definitely better now than she was in the first month.  Turning over in bed sometimes brings on the pain..  Pain is now intermittent and is brought on by twisting and bending.  There is no radiating pain in the legs, no numbness tingling or weakness, no bowel or bladder dysfunction, and no saddle anesthesia.  Is been no change since her PET CT scan in terms of symptoms.  She has never had a back fracture before and she has known osteoporosis.  She has not been referred to the bone density people    Pain score and diagram " "reviewed.  See questionnaire.      ROS: .  Otherwise negative for bowel/bladder dysfunction, balance changes, headache, leg pain/numbness/weakness, fevers, chills, night sweats, unexplained weight loss;  otherwise unremarkable.   See the patient's intake questionnaire from today for details.    Treatment to Date: Nothing    MEDICATIONS:  Reviewed.    ALLERGIES:  Reviewed.     Past medical and surgical history:   Pertinent for COPD, ex smoker, essential thrombocythemia, lung mass-likely cancer, heart disease, status post radiation therapy to left upper lobe lung, thrombocythemia on baby aspirin    SOCIAL HX: She and her  have 2 children and 3 grandchildren.  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  Ports hobbies activities: Tennis until the COVID pandemic (3.5 [employer]), hip, \", former skier they are leaving for a cruise to the [facility] in mid October, and a 4-month-world cruise January 2024    OBJECTIVE:    IMAGING: Images and report reviewed.    PET and CT on 8/9/2023     Severe L4 compression fracture with associated inflammatory uptake.  Mild uptake along the superior compression deformity of T11 vertebral  body. Mild uptake in the soft tissues posterior to the L3-5 spinous  processes with mild stranding, likely inflammatory.     1. Mildly hypermetabolic 1.1 cm left apical solid pulmonary nodule,  which has gradually increased in size and new since chest CT dated  3/17/2022, concerning for primary pulmonary malignancy.     2. No evidence of [place] disease or distant metastasis.     3. Severe L4 compression fracture (favored osteoporotic) with  associated inflammatory uptake.     4. Additional ancillary findings as described in the body of the  report.       EXAMINATION:    --CONSTITUTIONAL:   No acute distress.  The patient is well nourished and well groomed.  She is in good physical condition and BMI is normal.  She transitions well without pushoff and moves fluidly about the room.  --SKIN:  Skin over the face, " bilateral lower extremities, and posterior torso is clean, dry, intact without rashes.    --GAIT:  is non-antalgic. Flat foot, heel and toe walking:  normal   .  Squat and rise   normal    the last with pain.  --STANDING EXAMINATION:    Symmetry of spine/pelvis   unremarkable   .      Range of motion globally limited secondary to midline low back pain at about L4.   Standing flexion   negative   .    Arturo's sign   negative    .     Stork test   negative    .   --NEUROLOGICAL:    SENSATION to light touch is intact in bilateral thighs, lower legs and feet.   REFLEXES:  patellar 2+, and achilles 1+.  Babinski is negative. No clonus.  MANUAL MOTOR TESTING:  L3- S1 Myotomes, Femoral, Obturator, Peroneal and Tibial nerves 5/5   DURAL STRETCH TESTS:  SLR negative.  Femoral Stretch Test not done.       ASSESSMENT: Sienna Bennett is a 72 year old female who presents  today for new patient evaluation of:    4 months out from L4 compression fracture      PLAN:  Consult with bone density clinic.  Consult with orthotics for a TLSO.  Consult with interventional radiology for kyphoplasty.  I did speak with the on-call nurse practitioner from  and they prefer an MRI prior to seeing her.  This will be ordered with Valium due to her claustrophobia.  She will talk to her hematologist regarding the aspirin prior to her procedure because of her history of thrombocythemia.      60 minutes of time spent doing chart review, history and exam, documentation, counseling, education, coordination of care, and other activities as described above.        Advised patient to call or return early if symptoms worsen, or having problems controlling bladder and bowel function or worsening leg weakness.     Please note: Voice recognition software was used in this dictation.  It may therefore contain typographical errors.    Solitario Ortiz MD             Again, thank you for allowing me to participate in the care of your patient.         Sincerely,        Solitario Ortiz MD

## 2023-09-20 NOTE — NURSING NOTE
"Sienna Bennett's goals for this visit include:   Chief Complaint   Patient presents with    New Patient     compressiomn fracture l 4 3       She requests these members of her care team be copied on today's visit information: yes    PCP: Kalli Valle    Referring Provider:  Kalli Valle MD  [address] [place] [address]    /80   Pulse 93   Ht 1.6 m (5' 3\")   Wt 49.9 kg (110 lb)   BMI 19.49 kg/m      Do you need any medication refills at today's visit? No  CHASIDY Lemos, LISA ([facility])      "

## 2023-09-20 NOTE — PATIENT INSTRUCTIONS
It was nice to meet you Kelsea.  We will get you set up with a back brace some Valium for the MRI and the radiology people will call you to discuss a kyphoplasty.  You might want to stop the baby aspirin now or you can wait till you talk to them, but typically they like you off of it for several days.  Talk to your hematologist to see what he recommends regarding the aspirin.  See the assessment and plan below for further details of our discussion today.  Good luck on your travel plans and I wish you all the best    ASSESSMENT: Sienna Bennett is a 72 year old female who presents  today for new patient evaluation of:    4 months out from L4 compression fracture      PLAN:  Consult with bone density clinic.  Consult with orthotics for a TLSO.  Consult with interventional radiology for kyphoplasty.  I did speak with the on-call nurse practitioner from  and they prefer an MRI prior to seeing her.  This will be ordered with Valium due to her claustrophobia.

## 2023-09-22 ENCOUNTER — MYC MEDICAL ADVICE (OUTPATIENT)
Dept: NEUROSURGERY | Facility: CLINIC | Age: 73
End: 2023-09-22
Payer: COMMERCIAL

## 2023-09-22 DIAGNOSIS — S32.040A COMPRESSION FRACTURE OF L4 VERTEBRA, INITIAL ENCOUNTER (H): Primary | ICD-10-CM

## 2023-09-22 DIAGNOSIS — M80.00XA AGE-RELATED OSTEOPOROSIS WITH CURRENT PATHOLOGICAL FRACTURE, INITIAL ENCOUNTER: ICD-10-CM

## 2023-09-27 ENCOUNTER — TELEPHONE (OUTPATIENT)
Dept: ENDOCRINOLOGY | Facility: CLINIC | Age: 73
End: 2023-09-27
Payer: COMMERCIAL

## 2023-09-27 NOTE — TELEPHONE ENCOUNTER
Dx is first available per protocol.   Marjan King RN on 9/27/2023 at 2:51 PM         [facility] Call Center    Phone Message    May a detailed message be left on voicemail: yes     Reason for Call: Appointment Intake    Referring Provider Name: Solitario Ortiz MD   Diagnosis and/or Symptoms:     Priority: 1-2 Weeks; Compression fracture of L4 vertebra, initial encounter (H) [S32.040A]  Age-related osteoporosis with current pathological fracture, initial encounter [...     History of Osteoporosis, back pain and L4 compression fracture. Need to discuss bone health. The pt is leaving the state in a few weeks and we would like her seen prior to that.     Patient prefers [facility] or [facility]    Action Taken: Other: Endo    Travel Screening: Not Applicable

## 2023-09-30 ENCOUNTER — HEALTH MAINTENANCE LETTER (OUTPATIENT)
Age: 73
End: 2023-09-30

## 2023-10-03 ENCOUNTER — ANCILLARY PROCEDURE (OUTPATIENT)
Dept: MRI IMAGING | Facility: CLINIC | Age: 73
End: 2023-10-03
Attending: PREVENTIVE MEDICINE
Payer: COMMERCIAL

## 2023-10-03 DIAGNOSIS — S32.040A COMPRESSION FRACTURE OF L4 VERTEBRA, INITIAL ENCOUNTER (H): ICD-10-CM

## 2023-10-03 PROCEDURE — 72148 MRI LUMBAR SPINE W/O DYE: CPT | Performed by: RADIOLOGY

## 2023-10-05 ENCOUNTER — OFFICE VISIT (OUTPATIENT)
Dept: VASCULAR SURGERY | Facility: CLINIC | Age: 73
End: 2023-10-05
Attending: PREVENTIVE MEDICINE
Payer: COMMERCIAL

## 2023-10-05 VITALS — HEART RATE: 91 BPM | SYSTOLIC BLOOD PRESSURE: 147 MMHG | OXYGEN SATURATION: 96 % | DIASTOLIC BLOOD PRESSURE: 87 MMHG

## 2023-10-05 DIAGNOSIS — M80.00XA OSTEOPOROSIS WITH CURRENT PATHOLOGICAL FRACTURE, UNSPECIFIED OSTEOPOROSIS TYPE, INITIAL ENCOUNTER: Primary | ICD-10-CM

## 2023-10-05 DIAGNOSIS — S32.040A COMPRESSION FRACTURE OF L4 VERTEBRA, INITIAL ENCOUNTER (H): ICD-10-CM

## 2023-10-05 PROCEDURE — 99204 OFFICE O/P NEW MOD 45 MIN: CPT | Mod: GC | Performed by: RADIOLOGY

## 2023-10-05 NOTE — NURSING NOTE
Chief Complaint   Patient presents with    Consult     Kyphoplasty eval. Pt has inquiry on recent MRI results. She is curious if the T11 area can be treated as well.        Medications and allergies reconciled.  Vitals collected.    Lyn Aguilera LPN

## 2023-10-05 NOTE — PROGRESS NOTES
INTERVENTIONAL RADIOLOGY CONSULTATION    Name: Sienna Bennett  Age: 73 year old   Referring Physician: Dr. Ortiz   REASON FOR REFERRAL: L4 compression fracture    HPI:   Sienna is a 73 year old female here for evaluation of low back L4 compression fracture.    She has pain when sitting 2/10. Getting up from a chair is 7/10. Getting out of bed can be a 9/10 especially if she twists to get out of bed. She thinks that the initial injury occurred in May- she had a URI and was coughing a lot and there was a time where she was coughing and her back spasms and also lifted suitcases. She and her  will be traveling on a cruise soon, returning the end of October.    PAST MEDICAL HISTORY:   Past Medical History:   Diagnosis Date     COPD (chronic obstructive pulmonary disease) (H) 2014     Essential thrombocythemia (H) 07/20/2022     Heart disease 2022     Lung mass 04/03/2023     S/P radiation therapy     5,000 cGy to VARSHA Lung (SBRT) completed on 9/15/2023 [facility]       PAST SURGICAL HISTORY:   Past Surgical History:   Procedure Laterality Date     BIOPSY Left ?    breast bx from calcifications, benign     ORTHOPEDIC SURGERY  1967    Tendon repair Hand     THYROID SURGERY      left part of thyroid gland- age 22       FAMILY HISTORY:   Family History   Problem Relation Age of Onset     Hypertension Mother      Hyperlipidemia Mother      Cerebrovascular Disease Father         At age 86     Prostate Cancer Father      Hypertension Brother      Bladder Cancer Brother      Lung Cancer Brother      Lymphoma Brother        SOCIAL HISTORY:   Social History     Tobacco Use     Smoking status: Former     Packs/day: 1.50     Years: 45.00     Pack years: 67.50     Types: Cigarettes     Start date: 1/1/1968     Quit date: 1/1/2013     Years since quitting: 10.7     Smokeless tobacco: Never   Substance Use Topics     Alcohol use: Yes     Comment: rare social use - maybe once a month       PROBLEM LIST:    Patient Active Problem List    Diagnosis Date Noted     Lung nodules 2023     Priority: Medium     Referred to [facility] by [employer] Results Team.       Essential thrombocythemia (H) 2022     Priority: Medium     Herpes simplex virus infection 2022     Priority: Medium     Gets recurrent outbreaks, usually on torso  Used acyclovir in past for       Chronic obstructive pulmonary disease, unspecified COPD type (H) 2022     Priority: Medium     Abnormal CT scan, chest 2022     Priority: Medium     Abnormal TSH 2022     Priority: Medium     Impaired fasting glucose 2022     Priority: Medium     Hyperlipidemia LDL goal <100 2022     Priority: Medium       MEDICATIONS:   Prescription Medications as of 10/5/2023         Rx Number Disp Refills Start End Last Dispensed Date Next Fill Date Owning Pharmacy    aspirin 81 MG EC tablet    2023        Si mg daily    Class: Historical    Fluticasone-Umeclidin-Vilanterol (TRELEGY ELLIPTA) 200-62.5-25 MCG/ACT oral inhaler  3 each 3 2023    [facility] [id number] [place] [address]    Sig: Inhale 1 puff into the lungs daily    Class: E-Prescribe    Notes to Pharmacy: 3 inhalers with 3 refills    Route: Inhalation    guaiFENesin (MUCINEX) 600 MG 12 hr tablet            Sig: Take 600-1,200 mg by mouth 2 times daily    Class: Historical    Route: Oral    hydroxyurea (HYDREA) 500 MG capsule  225 capsule 4 2023    [facility] [id number] [place] [address]    Sig: Take 1 capsule (500 mg) by mouth daily 5 days/week with 1000 mg the other 2 days    Class: E-Prescribe    Notes to Pharmacy: Dispense three month supply please.    Route: Oral    levalbuterol (XOPENEX HFA) 45 MCG/ACT inhaler  15 g 1 3/24/2023    [facility] [id number] [place] [address]    Sig: INHALE 2 PUFFS INTO THE LUNGS EVERY 4 HOURS AS NEEDED FOR SHORTNESS OF BREATH /  DYSPNEA OR WHEEZING    Class: E-Prescribe    Route: Inhalation    Probiotic Product (PROBIOTIC DAILY PO)            Sig: Take by mouth daily    Class: Historical    Route: Oral    rosuvastatin (CRESTOR) 20 MG tablet  90 tablet 3 6/25/2023    [place] [address] [place] [address]    Sig: Take 1 tablet (20 mg) by mouth daily    Class: E-Prescribe    Route: Oral    diazepam (VALIUM) 2 MG tablet  2 tablet 0 9/20/2023    [place] [address] [place] [address]    Sig: Take one pill by mouth 60 minutes before the MRI, and you may take the second pill as needed for anxiety.  Make sure you have a  to and from the facility.    Class: E-Prescribe    fluticasone (FLONASE) 50 MCG/ACT nasal spray  48 mL 2 8/23/2023    [place] [address] [place] [address]    Sig: INSTILL 2 SPRAYS INTO BOTH NOSTRILS DAILY.    Class: E-Prescribe    loratadine (CLARITIN) 10 MG tablet            Sig: Take 10 mg by mouth as needed    Class: Historical    Route: Oral            ALLERGIES:   Environmental [adhesive tape] and Wellbutrin [bupropion]    ROS:  Negative unless otherwise stated in HPI.      Physical Examination:   VITALS:   BP (!) 147/87 (BP Location: Right arm, Patient Position: Sitting, Cuff Size: Adult Regular)   Pulse 91   SpO2 96%     GENERAL: Healthy, alert and no distress  SKIN: Visible skin clear. No significant rash, abnormal pigmentation or lesions.  PSYCH: Mentation appears normal, affect normal/bright, judgement and insight intact, normal speech and appearance well-groomed.  EYES: Eyes grossly normal to inspection.  No discharge or erythema, or obvious scleral/conjunctival abnormalities.  RESP: No audible wheeze, cough, or visible cyanosis.  No visible retractions or increased work of breathing.    MSK: Tenderness to palpation in L4/L5 area.  NEURO: Mentation and speech appropriate for age. 5+ strength, normal sensation, normal range of motion, no  radiation of pain into the legs. Mild reduced rotation to the left with mild pain in neck.     Labs:    BMP RESULTS:  Lab Results   Component Value Date     04/19/2022    POTASSIUM 4.8 04/19/2022    CHLORIDE 104 04/19/2022    CO2 28 04/19/2022    ANIONGAP 7 04/19/2022     (H) 01/12/2023    BUN 22 04/19/2022    CR 1.4 (H) 08/09/2023    CR 0.87 04/19/2022    GFRESTIMATED 40 (L) 08/09/2023    KAYLAN 9.7 04/19/2022        CBC RESULTS:  Lab Results   Component Value Date    WBC 4.1 07/13/2023    RBC 3.60 (L) 07/13/2023    HGB 13.6 07/13/2023    HCT 41.5 07/13/2023     (H) 07/13/2023    MCH 37.8 (H) 07/13/2023    MCHC 32.8 07/13/2023    RDW 12.8 07/13/2023     07/13/2023       INR/PTT:  No results found for: INR, PTT    Diagnostic studies:   MR Lumbar Spine w/o Contrast  Result Date: 10/4/2023  IMPRESSION: 1.  There is fracture deformity of the L4 vertebral body with mild edema along the left superior endplate. Imaging appearance favors a subacute superior endplate injury superimposed (with increased T2 signal) on chronic fracture.     Assessment   Sienna is a 73 year old female here for evaluation of low back L4 compression fracture. We reviewed the results of her MR lumbar spine from 10/4/2023. There is edema along the left superior endplate of the L4 vertebral body indicating the presence of a subacute fracture.    From an interventional radiology perspective, she is a good candidate for balloon kyphoplasty with cement augmentation of the L4 compression fracture. We explained the details of the procedure for balloon kyphoplasty and cement augmentation. RF ablation will be unnecessary given the unlikelihood of her compression fracture being related to malignancy based on the most recent PET/CT on 8/9/2023. We explained the risks associated with the procedure include bleeding, failure to reduce pain, cement migration to the lungs, and infection. Patient was in agreement with the  procedure.    Patient was disclosed of any financial interests and conflicts of interest that exist.    Plan   - We will schedule Sienna for L4 compression fracture vertebral augmentation moderate sedation (favor kyphoplasty).    I, Jean Pierre Nguyen, was present with the resident/fellow/TANIA during the history and exam. I discussed the case with the resident/fellow/TANIA and agree with the findings as documented in the assessment and plan.    Jean Pierre Nguyen MD    Vascular and Interventional Radiolgy  [facility]'          Patient Care Team:  Kalli Valle MD as PCP - General (Family Medicine)  Unassigned [facility] Eve Sheehan MD Bloom, Stuart Hunegs, MD as MD (Hematology & Oncology)  Kalli Valle MD as Assigned PCP  Sanjay Lambert MD as Assigned Cancer Care Provider  Kalli Valle MD as Referring Physician (Family Medicine)  Cami Villatoro MD as MD (Dermatology)  Bogdan Crespo MD as MD (Cardiovascular Disease)  Amber Baumann PA-C as Physician Assistant (Dermatology)  Bogdan Crespo MD as Assigned Heart and Vascular Provider  Kerri Stevenson, RN as Specialty Care Coordinator (Hematology & Oncology)  Gilda Walker MD as Assigned Pulmonology Provider  Amber Baumann PA-C as Assigned Surgical Provider  David Arias MD as MD (Cardiovascular & Thoracic Surgery)  Peter Gillis MD as MD (Radiation Oncology)  Chinmay Moscoso MD as Fellow (Endocrinology, Diabetes, and Metabolism)  EVANS HERNANDEZ

## 2023-10-05 NOTE — LETTER
10/5/2023       RE: Sienna Bennett  [address]  [place] [address]     Dear Colleague,    Thank you for referring your patient, Sienna Bennett, to the [facility] VASCULAR CLINIC [place] at [facility]. Please see a copy of my visit note below.        INTERVENTIONAL RADIOLOGY CONSULTATION    Name: Sienna Bennett  Age: 73 year old   Referring Physician: Dr. Ortiz   REASON FOR REFERRAL: L4 compression fracture    HPI:   Sienna is a 73 year old female here for evaluation of low back L4 compression fracture.    She has pain when sitting 2/10. Getting up from a chair is 7/10. Getting out of bed can be a 9/10 especially if she twists to get out of bed. She thinks that the initial injury occurred in May- she had a URI and was coughing a lot and there was a time where she was coughing and her back spasms and also lifted suitcases. She and her  will be traveling on a cruise soon, returning the end of October.    PAST MEDICAL HISTORY:   Past Medical History:   Diagnosis Date    COPD (chronic obstructive pulmonary disease) (H) 2014    Essential thrombocythemia (H) 07/20/2022    Heart disease 2022    Lung mass 04/03/2023    S/P radiation therapy     5,000 cGy to [facility] Lung (SBRT) completed on 9/15/2023 - [facility]       PAST SURGICAL HISTORY:   Past Surgical History:   Procedure Laterality Date    BIOPSY Left ?    breast bx from calcifications, benign    ORTHOPEDIC SURGERY  1967    Tendon repair Hand    THYROID SURGERY      left part of thyroid gland- age 22       FAMILY HISTORY:   Family History   Problem Relation Age of Onset    Hypertension Mother     Hyperlipidemia Mother     Cerebrovascular Disease Father         At age 86    Prostate Cancer Father     Hypertension Brother     Bladder Cancer Brother     Lung Cancer Brother     Lymphoma Brother        SOCIAL HISTORY:   Social History     Tobacco Use    Smoking status: Former      Packs/day: 1.50     Years: 45.00     Pack years: 67.50     Types: Cigarettes     Start date: 1968     Quit date: 2013     Years since quitting: 10.7    Smokeless tobacco: Never   Substance Use Topics    Alcohol use: Yes     Comment: rare social use - maybe once a month       PROBLEM LIST:   Patient Active Problem List    Diagnosis Date Noted    Lung nodules 2023     Priority: Medium     Referred to [facility] by [facility] Results Team.      Essential thrombocythemia (H) 2022     Priority: Medium    Herpes simplex virus infection 2022     Priority: Medium     Gets recurrent outbreaks, usually on torso  Used acyclovir in past for      Chronic obstructive pulmonary disease, unspecified COPD type (H) 2022     Priority: Medium    Abnormal CT scan, chest 2022     Priority: Medium    Abnormal TSH 2022     Priority: Medium    Impaired fasting glucose 2022     Priority: Medium    Hyperlipidemia LDL goal <100 2022     Priority: Medium       MEDICATIONS:   Prescription Medications as of 10/5/2023         Rx Number Disp Refills Start End Last Dispensed Date Next Fill Date Owning Pharmacy    aspirin 81 MG EC tablet    2023        Si mg daily    Class: Historical    Fluticasone-Umeclidin-Vilanterol (TRELEGY ELLIPTA) 200-62.5-25 MCG/ACT oral inhaler  3 each 3 2023    [facility] [address] [place] [address]    Sig: Inhale 1 puff into the lungs daily    Class: E-Prescribe    Notes to Pharmacy: 3 inhalers with 3 refills    Route: Inhalation    guaiFENesin (MUCINEX) 600 MG 12 hr tablet            Sig: Take 600-1,200 mg by mouth 2 times daily    Class: Historical    Route: Oral    hydroxyurea (HYDREA) 500 MG capsule  225 capsule 4 2023    [facility] [address] [place] [address]    Sig: Take 1 capsule (500 mg) by mouth daily 5 days/week with 1000 mg the other 2 days    Class: E-Prescribe     Notes to Pharmacy: Dispense three month supply please.    Route: Oral    levalbuterol (XOPENEX HFA) 45 MCG/ACT inhaler  15 g 1 3/24/2023    [place] [address] [place] [address]    Sig: INHALE 2 PUFFS INTO THE LUNGS EVERY 4 HOURS AS NEEDED FOR SHORTNESS OF BREATH / DYSPNEA OR WHEEZING    Class: E-Prescribe    Route: Inhalation    Probiotic Product (PROBIOTIC DAILY PO)            Sig: Take by mouth daily    Class: Historical    Route: Oral    rosuvastatin (CRESTOR) 20 MG tablet  90 tablet 3 6/25/2023    [facility] [address] [place] [address]    Sig: Take 1 tablet (20 mg) by mouth daily    Class: E-Prescribe    Route: Oral    diazepam (VALIUM) 2 MG tablet  2 tablet 0 9/20/2023    [place] [address] [place] [address]    Sig: Take one pill by mouth 60 minutes before the MRI, and you may take the second pill as needed for anxiety.  Make sure you have a  to and from the facility.    Class: E-Prescribe    fluticasone (FLONASE) 50 MCG/ACT nasal spray  48 mL 2 8/23/2023    [place] [address] [place] [address]    Sig: INSTILL 2 SPRAYS INTO BOTH NOSTRILS DAILY.    Class: E-Prescribe    loratadine (CLARITIN) 10 MG tablet            Sig: Take 10 mg by mouth as needed    Class: Historical    Route: Oral            ALLERGIES:   Environmental [adhesive tape] and Wellbutrin [bupropion]    ROS:  Negative unless otherwise stated in HPI.      Physical Examination:   VITALS:   BP (!) 147/87 (BP Location: Right arm, Patient Position: Sitting, Cuff Size: Adult Regular)   Pulse 91   SpO2 96%     GENERAL: Healthy, alert and no distress  SKIN: Visible skin clear. No significant rash, abnormal pigmentation or lesions.  PSYCH: Mentation appears normal, affect normal/bright, judgement and insight intact, normal speech and appearance well-groomed.  EYES: Eyes grossly normal to inspection.  No discharge or erythema, or obvious  scleral/conjunctival abnormalities.  RESP: No audible wheeze, cough, or visible cyanosis.  No visible retractions or increased work of breathing.    MSK: Tenderness to palpation in L4/L5 area.  NEURO: Mentation and speech appropriate for age. 5+ strength, normal sensation, normal range of motion, no radiation of pain into the legs. Mild reduced rotation to the left with mild pain in neck.     Labs:    BMP RESULTS:  Lab Results   Component Value Date     04/19/2022    POTASSIUM 4.8 04/19/2022    CHLORIDE 104 04/19/2022    CO2 28 04/19/2022    ANIONGAP 7 04/19/2022     (H) 01/12/2023    BUN 22 04/19/2022    CR 1.4 (H) 08/09/2023    CR 0.87 04/19/2022    GFRESTIMATED 40 (L) 08/09/2023    KAYLAN 9.7 04/19/2022        CBC RESULTS:  Lab Results   Component Value Date    WBC 4.1 07/13/2023    RBC 3.60 (L) 07/13/2023    HGB 13.6 07/13/2023    HCT 41.5 07/13/2023     (H) 07/13/2023    MCH 37.8 (H) 07/13/2023    MCHC 32.8 07/13/2023    RDW 12.8 07/13/2023     07/13/2023       INR/PTT:  No results found for: INR, PTT    Diagnostic studies:   MR Lumbar Spine w/o Contrast  Result Date: 10/4/2023  IMPRESSION: 1.  There is fracture deformity of the L4 vertebral body with mild edema along the left superior endplate. Imaging appearance favors a subacute superior endplate injury superimposed (with increased T2 signal) on chronic fracture.     Assessment   Sienna is a 73 year old female here for evaluation of low back L4 compression fracture. We reviewed the results of her MR lumbar spine from 10/4/2023. There is edema along the left superior endplate of the L4 vertebral body indicating the presence of a subacute fracture.    From an interventional radiology perspective, she is a good candidate for balloon kyphoplasty with cement augmentation of the L4 compression fracture. We explained the details of the procedure for balloon kyphoplasty and cement augmentation. RF ablation will be unnecessary given the  unlikelihood of her compression fracture being related to malignancy based on the most recent PET/CT on 8/9/2023. We explained the risks associated with the procedure include bleeding, failure to reduce pain, cement migration to the lungs, and infection. Patient was in agreement with the procedure.    Patient was disclosed of any financial interests and conflicts of interest that exist.    Plan   - We will schedule Sienna for L4 compression fracture vertebral augmentation moderate sedation (favor kyphoplasty).    I, Jean Pierre Nguyen, was present with the resident/fellow/TANIA during the history and exam. I discussed the case with the resident/fellow/TANIA and agree with the findings as documented in the assessment and plan.        Again, thank you for allowing me to participate in the care of your patient.      Sincerely,    Jean Pierre Nguyen MD

## 2023-10-09 RX ORDER — KETOROLAC TROMETHAMINE 30 MG/ML
15 INJECTION, SOLUTION INTRAMUSCULAR; INTRAVENOUS ONCE
Status: CANCELLED | OUTPATIENT
Start: 2023-10-09 | End: 2023-10-09

## 2023-10-11 ENCOUNTER — APPOINTMENT (OUTPATIENT)
Dept: MEDSURG UNIT | Facility: CLINIC | Age: 73
End: 2023-10-11
Attending: RADIOLOGY
Payer: COMMERCIAL

## 2023-10-11 ENCOUNTER — HOSPITAL ENCOUNTER (OUTPATIENT)
Facility: CLINIC | Age: 73
Discharge: HOME | End: 2023-10-11
Attending: RADIOLOGY | Admitting: RADIOLOGY
Payer: COMMERCIAL

## 2023-10-11 ENCOUNTER — APPOINTMENT (OUTPATIENT)
Dept: INTERVENTIONAL RADIOLOGY/VASCULAR | Facility: CLINIC | Age: 73
End: 2023-10-11
Attending: RADIOLOGY
Payer: COMMERCIAL

## 2023-10-11 VITALS
RESPIRATION RATE: 16 BRPM | TEMPERATURE: 97.5 F | WEIGHT: 110.01 LBS | DIASTOLIC BLOOD PRESSURE: 51 MMHG | BODY MASS INDEX: 19.49 KG/M2 | HEART RATE: 81 BPM | SYSTOLIC BLOOD PRESSURE: 112 MMHG | OXYGEN SATURATION: 90 %

## 2023-10-11 DIAGNOSIS — S32.040A COMPRESSION FRACTURE OF L4 VERTEBRA, INITIAL ENCOUNTER (H): ICD-10-CM

## 2023-10-11 LAB
ANION GAP SERPL CALCULATED.3IONS-SCNC: 11 MMOL/L (ref 7–15)
BUN SERPL-MCNC: 14.3 MG/DL (ref 8–23)
CALCIUM SERPL-MCNC: 9 MG/DL (ref 8.8–10.2)
CHLORIDE SERPL-SCNC: 102 MMOL/L (ref 98–107)
CREAT SERPL-MCNC: 0.76 MG/DL (ref 0.51–0.95)
DEPRECATED HCO3 PLAS-SCNC: 26 MMOL/L (ref 22–29)
EGFRCR SERPLBLD CKD-EPI 2021: 83 ML/MIN/1.73M2
ERYTHROCYTE [DISTWIDTH] IN BLOOD BY AUTOMATED COUNT: 13.5 % (ref 10–15)
GLUCOSE SERPL-MCNC: 92 MG/DL (ref 70–99)
HCT VFR BLD AUTO: 39.2 % (ref 35–47)
HGB BLD-MCNC: 13.5 G/DL (ref 11.7–15.7)
INR PPP: 0.97 (ref 0.85–1.15)
MCH RBC QN AUTO: 39.7 PG (ref 26.5–33)
MCHC RBC AUTO-ENTMCNC: 34.4 G/DL (ref 31.5–36.5)
MCV RBC AUTO: 115 FL (ref 78–100)
PLATELET # BLD AUTO: 378 10E3/UL (ref 150–450)
POTASSIUM SERPL-SCNC: 4 MMOL/L (ref 3.4–5.3)
RBC # BLD AUTO: 3.4 10E6/UL (ref 3.8–5.2)
SODIUM SERPL-SCNC: 139 MMOL/L (ref 135–145)
WBC # BLD AUTO: 4.3 10E3/UL (ref 4–11)

## 2023-10-11 PROCEDURE — 258N000003 HC RX IP 258 OP 636: Performed by: NURSE PRACTITIONER

## 2023-10-11 PROCEDURE — 85027 COMPLETE CBC AUTOMATED: CPT | Performed by: NURSE PRACTITIONER

## 2023-10-11 PROCEDURE — 99152 MOD SED SAME PHYS/QHP 5/>YRS: CPT

## 2023-10-11 PROCEDURE — 250N000011 HC RX IP 250 OP 636: Mod: JZ | Performed by: NURSE PRACTITIONER

## 2023-10-11 PROCEDURE — 22514 PERQ VERTEBRAL AUGMENTATION: CPT

## 2023-10-11 PROCEDURE — 250N000011 HC RX IP 250 OP 636: Performed by: RADIOLOGY

## 2023-10-11 PROCEDURE — C1889 IMPLANT/INSERT DEVICE, NOC: HCPCS

## 2023-10-11 PROCEDURE — 80048 BASIC METABOLIC PNL TOTAL CA: CPT | Performed by: NURSE PRACTITIONER

## 2023-10-11 PROCEDURE — 36415 COLL VENOUS BLD VENIPUNCTURE: CPT | Performed by: NURSE PRACTITIONER

## 2023-10-11 PROCEDURE — 999N000133 HC STATISTIC PP CARE STAGE 2

## 2023-10-11 PROCEDURE — 22514 PERQ VERTEBRAL AUGMENTATION: CPT | Mod: GC | Performed by: RADIOLOGY

## 2023-10-11 PROCEDURE — 999N000142 HC STATISTIC PROCEDURE PREP ONLY

## 2023-10-11 PROCEDURE — 999N000285 HC STATISTIC VASC ACCESS LAB DRAW WITH PIV START

## 2023-10-11 PROCEDURE — 272N000718 HC KIT CR34

## 2023-10-11 PROCEDURE — 272N000495 HC NEEDLE CR14

## 2023-10-11 PROCEDURE — 99153 MOD SED SAME PHYS/QHP EA: CPT

## 2023-10-11 PROCEDURE — 85610 PROTHROMBIN TIME: CPT | Performed by: NURSE PRACTITIONER

## 2023-10-11 PROCEDURE — 999N000054 HC STATISTIC EKG NON-CHARGEABLE

## 2023-10-11 PROCEDURE — 93005 ELECTROCARDIOGRAM TRACING: CPT

## 2023-10-11 RX ORDER — ONDANSETRON 2 MG/ML
4 INJECTION INTRAMUSCULAR; INTRAVENOUS
Status: DISCONTINUED | OUTPATIENT
Start: 2023-10-11 | End: 2023-10-11 | Stop reason: HOSPADM

## 2023-10-11 RX ORDER — CEFAZOLIN SODIUM 2 G/100ML
2 INJECTION, SOLUTION INTRAVENOUS
Status: COMPLETED | OUTPATIENT
Start: 2023-10-11 | End: 2023-10-11

## 2023-10-11 RX ORDER — OXYCODONE AND ACETAMINOPHEN 5; 325 MG/1; MG/1
1-2 TABLET ORAL EVERY 4 HOURS PRN
Qty: 20 TABLET | Refills: 0 | Status: SHIPPED | OUTPATIENT
Start: 2023-10-11 | End: 2023-11-07

## 2023-10-11 RX ORDER — NALOXONE HYDROCHLORIDE 0.4 MG/ML
0.2 INJECTION, SOLUTION INTRAMUSCULAR; INTRAVENOUS; SUBCUTANEOUS
Status: DISCONTINUED | OUTPATIENT
Start: 2023-10-11 | End: 2023-10-11 | Stop reason: HOSPADM

## 2023-10-11 RX ORDER — NALOXONE HYDROCHLORIDE 0.4 MG/ML
0.4 INJECTION, SOLUTION INTRAMUSCULAR; INTRAVENOUS; SUBCUTANEOUS
Status: DISCONTINUED | OUTPATIENT
Start: 2023-10-11 | End: 2023-10-11 | Stop reason: HOSPADM

## 2023-10-11 RX ORDER — FLUMAZENIL 0.1 MG/ML
0.2 INJECTION, SOLUTION INTRAVENOUS
Status: DISCONTINUED | OUTPATIENT
Start: 2023-10-11 | End: 2023-10-11 | Stop reason: HOSPADM

## 2023-10-11 RX ORDER — FENTANYL CITRATE 50 UG/ML
25-50 INJECTION, SOLUTION INTRAMUSCULAR; INTRAVENOUS EVERY 5 MIN PRN
Status: DISCONTINUED | OUTPATIENT
Start: 2023-10-11 | End: 2023-10-11 | Stop reason: HOSPADM

## 2023-10-11 RX ORDER — LIDOCAINE 40 MG/G
CREAM TOPICAL
Status: DISCONTINUED | OUTPATIENT
Start: 2023-10-11 | End: 2023-10-11 | Stop reason: HOSPADM

## 2023-10-11 RX ORDER — HYDROCODONE BITARTRATE AND ACETAMINOPHEN 5; 325 MG/1; MG/1
1 TABLET ORAL EVERY 4 HOURS PRN
Status: DISCONTINUED | OUTPATIENT
Start: 2023-10-11 | End: 2023-10-11 | Stop reason: HOSPADM

## 2023-10-11 RX ORDER — SODIUM CHLORIDE 9 MG/ML
INJECTION, SOLUTION INTRAVENOUS CONTINUOUS
Status: DISCONTINUED | OUTPATIENT
Start: 2023-10-11 | End: 2023-10-11 | Stop reason: HOSPADM

## 2023-10-11 RX ORDER — BUPIVACAINE HYDROCHLORIDE 5 MG/ML
30 INJECTION, SOLUTION PERINEURAL ONCE
Status: COMPLETED | OUTPATIENT
Start: 2023-10-11 | End: 2023-10-11

## 2023-10-11 RX ADMIN — MIDAZOLAM 1 MG: 1 INJECTION INTRAMUSCULAR; INTRAVENOUS at 15:47

## 2023-10-11 RX ADMIN — FENTANYL CITRATE 25 MCG: 50 INJECTION, SOLUTION INTRAMUSCULAR; INTRAVENOUS at 16:15

## 2023-10-11 RX ADMIN — FENTANYL CITRATE 25 MCG: 50 INJECTION, SOLUTION INTRAMUSCULAR; INTRAVENOUS at 16:04

## 2023-10-11 RX ADMIN — FENTANYL CITRATE 50 MCG: 50 INJECTION, SOLUTION INTRAMUSCULAR; INTRAVENOUS at 15:51

## 2023-10-11 RX ADMIN — FENTANYL CITRATE 25 MCG: 50 INJECTION, SOLUTION INTRAMUSCULAR; INTRAVENOUS at 15:41

## 2023-10-11 RX ADMIN — MIDAZOLAM 0.5 MG: 1 INJECTION INTRAMUSCULAR; INTRAVENOUS at 16:22

## 2023-10-11 RX ADMIN — MIDAZOLAM 0.5 MG: 1 INJECTION INTRAMUSCULAR; INTRAVENOUS at 15:41

## 2023-10-11 RX ADMIN — BUPIVACAINE HYDROCHLORIDE 125 MG: 5 INJECTION, SOLUTION PERINEURAL at 16:08

## 2023-10-11 RX ADMIN — MIDAZOLAM 0.5 MG: 1 INJECTION INTRAMUSCULAR; INTRAVENOUS at 16:04

## 2023-10-11 RX ADMIN — CEFAZOLIN SODIUM 2 G: 2 INJECTION, SOLUTION INTRAVENOUS at 14:08

## 2023-10-11 RX ADMIN — SODIUM CHLORIDE: 9 INJECTION, SOLUTION INTRAVENOUS at 14:08

## 2023-10-11 ASSESSMENT — ACTIVITIES OF DAILY LIVING (ADL)
ADLS_ACUITY_SCORE: 35

## 2023-10-11 NOTE — PROGRESS NOTES
Arrived to 2A via litter from IR s/p L4 kyphoplasty. Lower back site has 2 small primapore bandages that are CDI with no bleeding or hematoma. She is alert and oriented x 4 and able to make needs known.  is at bedside.

## 2023-10-11 NOTE — PRE-PROCEDURE
GENERAL PRE-PROCEDURE:   Procedure:  L4 vertebral augmentation   Date/Time:  10/11/2023 1:29 PM    Verbal consent obtained?: Yes    Written consent obtained?: Yes    Risks and benefits: Risks, benefits and alternatives were discussed    Consent given by:  Patient  Patient states understanding of procedure being performed: Yes    Patient's understanding of procedure matches consent: Yes    Procedure consent matches procedure scheduled: Yes    Expected level of sedation:  Moderate  Appropriately NPO:  Yes  ASA Class:  2  Mallampati  :  Grade 2- soft palate, base of uvula, tonsillar pillars, and portion of posterior pharyngeal wall visible  Lungs:  Lungs clear with good breath sounds bilaterally  Heart:  Normal heart sounds and rate  History & Physical reviewed:  History and physical reviewed and no updates needed  Statement of review:  I have reviewed the lab findings, diagnostic data, medications, and the plan for sedation

## 2023-10-11 NOTE — IR NOTE
Patient Name: Sienna Bennett  Medical Record Number: [id number]  Today's Date: 10/11/2023    Procedure: L4 Kyphoplasty  Proceduralist: Dr. Talaie, Dr. Heshmatzaday Behzadi     Procedure Start: 1607  Procedure end: 1651  Sedation medications administered:   Fentanyl 150 mcg  Versed 3 mg  Sedation time: 70 minutes (1541 - 1651)     Report given to: Anali JAIME 2A  : aubrie    1 hour flat bedrest.     Other Notes: Pt arrived to IR room 3 from . Consent reviewed. Pt denies any questions or concerns regarding procedure. Pt positioned prone and monitored per protocol. Pt tolerated procedure without any noted complications. Pt transferred back to 2A.

## 2023-10-11 NOTE — PROGRESS NOTES
Prep complete for IR kyphoplasty procedure. Consent signed. PIV placed, fluids and antibiotics infusing. INR and CBC resulted, waiting for BMP to result. Patient is alert and oriented x 4, independent with cares, able to make needs known.

## 2023-10-11 NOTE — DISCHARGE INSTRUCTIONS
Kyphoplasty: What to Expect at Home  Your Recovery  After kyphoplasty to relieve pain from compression fractures, your back may feel sore where the hollow needle (trocar) went into your back. This should go away in a few days. Most people are able to return to their daily activities within a day.  This care sheet gives you a general idea about how long it will take for you to recover. But each person recovers at a different pace. Follow the steps below to get better as quickly as possible.  How can you care for yourself at home?  Activity    Take it easy for the first 24 hours. Rest when you feel tired. Getting enough sleep will help you recover.     For the first day after the procedure, avoid lifting anything that would make you strain. This may include heavy grocery bags and milk containers, a heavy briefcase or backpack, cat litter or dog food bags, a vacuum , or a child.   Diet    You can eat your normal diet. If your stomach is upset, try bland, low-fat foods like plain rice, broiled chicken, toast, and yogurt.   Medicines    Your doctor will tell you if and when you can restart your medicines. You will also get instructions about taking any new medicines.     If you stopped taking aspirin or some other blood thinner, your doctor will tell you when to start taking it again.     Be safe with medicines. Take pain medicines exactly as directed.  If the doctor gave you a prescription medicine for pain, take it as prescribed.  If you are not taking a prescription pain medicine, ask your doctor if you can take an over-the-counter medicine.  Do not take two or more pain medicines at the same time unless your doctor told you to. Many pain medicines have acetaminophen, which is Tylenol. Too much acetaminophen (Tylenol) can be harmful.   Incision care    You will have a dressing over the cut (incision). A dressing helps the incision heal and protects it. Your doctor will tell you how to take care of this.  Leave  "the band-aids on for 24 hours then you may remove them and take a shower.    Ice    If you are sore where the needle was inserted, put ice or a cold pack on your back for 10 to 20 minutes at a time. Try to do this every 1 to 2 hours for the next 3 days (when you are awake) or until the swelling goes down. Put a thin cloth between the ice and your skin.   Follow-up care is a key part of your treatment and safety. Be sure to make and go to all appointments, and call your doctor if you are having problems. It's also a good idea to know your test results and keep a list of the medicines you take.  When should you call for help?   Call 911 anytime you think you may need emergency care. For example, call if:    You passed out (lost consciousness).     You have severe trouble breathing.     You have sudden chest pain and shortness of breath, or you cough up blood.     You are unable to move a leg at all.   Call your doctor now or seek immediate medical care if:    You have new or worse symptoms in your legs, belly, or buttocks. Symptoms may include:  Numbness or tingling.  Weakness.  Pain.     You lose bladder or bowel control.     You have signs of infection, such as:  Increased pain, swelling, warmth, or redness.  Red streaks leading from the incision.  Pus draining from the incision.  A fever.   Watch closely for any changes in your health, and be sure to contact your doctor if:    You do not get better as expected.   Where can you learn more?  Go to https://www.[employer].net/patiented  Enter O461 in the search box to learn more about \"Kyphoplasty: What to Expect at Home.\"  Current as of: November 9, 2022               Content Version: 13.7    8711-8353 [employer].   Care instructions adapted under license by your healthcare professional. If you have questions about a medical condition or this instruction, always ask your healthcare professional. [employer] disclaims any warranty or " liability for your use of this information.    Date of Procedure: October 11, 2023  Procedure Performed By: Dr. Nguyen and Dr. Behzadi    Telephone Numbers: [phone]      Monday-Friday 7:30 am to 4:00 pm  [phone] After 4:00 pm Monday-Friday, Weekends & Holidays.   Ask for the Interventional Radiologist on call.  Someone is on call 24 hrs/day  [facility] toll free number: [phone] Monday -Friday 8:00 am to 4:30 pm  [facility] Emergency Dept: [phone]

## 2023-10-12 LAB
ATRIAL RATE - MUSE: 82 BPM
DIASTOLIC BLOOD PRESSURE - MUSE: NORMAL MMHG
INTERPRETATION ECG - MUSE: NORMAL
P AXIS - MUSE: 79 DEGREES
PR INTERVAL - MUSE: 110 MS
QRS DURATION - MUSE: 74 MS
QT - MUSE: 386 MS
QTC - MUSE: 450 MS
R AXIS - MUSE: 73 DEGREES
SYSTOLIC BLOOD PRESSURE - MUSE: NORMAL MMHG
T AXIS - MUSE: 81 DEGREES
VENTRICULAR RATE- MUSE: 82 BPM

## 2023-10-12 NOTE — PROGRESS NOTES
"Bedrest completed. She has ambulated in the hallway, voided, and tolerated food and fluids. Bandages to lower back are CDI with no bleeding or hematoma. She has some discomfort but tolerable. Gait is steady. Patient states she feels \"good\". PIV removed. Patient and  verbalized understanding of all discharge instructions, all questions answered. Dr. Nguyen saw patient prior to discharge and answered all questions. Patient assisted to main entrance with all belongings, including prescription medication, accompanied by  and this RN. Patient discharged to home with .   "

## 2023-10-23 ENCOUNTER — TELEPHONE (OUTPATIENT)
Dept: PULMONOLOGY | Facility: CLINIC | Age: 73
End: 2023-10-23
Payer: COMMERCIAL

## 2023-10-23 NOTE — CONFIDENTIAL NOTE
10/23 called patient (1st attempt) left voicemail and provided [phone] for patient to call back and reschedule appointment on 12/04/2023 due to provider being out of clinic. Per provider, patient can reschedule with Nubia Gallardo PA-C in  pul.         Deisy nuñez Complex   Gastroenterology, Infectious Diseases, Nephrology, Pulmonology and Rheumatology Specialties  [facility]

## 2023-10-25 NOTE — PROGRESS NOTES
Radiotherapy Treatment Summary              PATIENT: Sienna Bennett  MEDICAL RECORD NO: [id number]   : 1950    DIAGNOSIS / ID: Ms. Bennett is a 72 year old female with poor lung function presenting with left upper lobe nodule, most consistent with a [id number] malignancy. Plan for SBRT to VARSHA.       INTENT OF RADIOTHERAPY: Definitive    CONCURRENT SYSTEMIC THERAPY: No              SITE OF TREATMENT: VARSHA    DATES  OF TREATMENT: - 9/15/23    TOTAL DOSE OF TREATMENT / FRACTIONS: 50Gy/5fx                                         FOLLOW UP PLAN:  Follow up with Radiation Oncology in 4 to 6 weeks, 3 months post RT with CT Chest    CC  Patient Care Team:  Kalli Valle MD as PCP - General (Family Medicine)  Unassigned Eve Alas, Sanjay Powers MD as MD (Hematology & Oncology)  Kalli Valle MD as Assigned PCP  Sanjay Lambert MD as Assigned Cancer Care Provider  Kalli Valle MD as Referring Physician (Family Medicine)  Cami Villatoro MD as MD (Dermatology)  Bogdan Crespo MD as MD (Cardiovascular Disease)  Amber Baumann PA-C as Physician Assistant (Dermatology)  Bogdan Crespo MD as Assigned Heart and Vascular Provider  Kerri Stevenson, RN as Specialty Care Coordinator (Hematology & Oncology)  Gilda Walker MD as Assigned Pulmonology Provider  Amber Baumann PA-C as Assigned Surgical Provider  David Arias MD as MD (Cardiovascular & Thoracic Surgery)  Peter Gillis MD as MD (Radiation Oncology)  Chinmay Moscoso MD as Fellow (Endocrinology, Diabetes, and Metabolism)       Peter Gillis M.D.  Department of Radiation Oncology  [facility]

## 2023-11-01 ENCOUNTER — MYC MEDICAL ADVICE (OUTPATIENT)
Dept: PHYSICAL MEDICINE AND REHAB | Facility: CLINIC | Age: 73
End: 2023-11-01

## 2023-11-02 ENCOUNTER — OFFICE VISIT (OUTPATIENT)
Dept: RADIATION ONCOLOGY | Facility: CLINIC | Age: 73
End: 2023-11-02
Payer: COMMERCIAL

## 2023-11-02 VITALS
OXYGEN SATURATION: 95 % | SYSTOLIC BLOOD PRESSURE: 149 MMHG | RESPIRATION RATE: 18 BRPM | TEMPERATURE: 98.7 F | HEART RATE: 95 BPM | DIASTOLIC BLOOD PRESSURE: 77 MMHG | BODY MASS INDEX: 19.49 KG/M2 | WEIGHT: 110 LBS

## 2023-11-02 DIAGNOSIS — C34.12 PRIMARY MALIGNANT NEOPLASM OF LEFT UPPER LOBE OF LUNG (H): Primary | ICD-10-CM

## 2023-11-02 PROCEDURE — 99024 POSTOP FOLLOW-UP VISIT: CPT | Performed by: SURGERY

## 2023-11-02 ASSESSMENT — PAIN SCALES - GENERAL: PAINLEVEL: MODERATE PAIN (4)

## 2023-11-02 NOTE — PATIENT INSTRUCTIONS
Please contact [facility] RN with questions or concerns following today's appointment: [phone].       Please feel free to leave a detailed message if your call is not answered.    If your call is not received before 3:00 PM, it may not be returned until the following business day.    If you are receiving radiation treatment and need assistance after 3:00 PM or on the weekends, please call [phone] and ask to speak to the radiation oncologist on-call.    Thank you!    Li JAIME

## 2023-11-02 NOTE — PROGRESS NOTES
Department of Radiation Oncology  [facility]    Health: Cancer Center  [facility]  [address] [address] [place] [address] [phone]       Radiation Oncology Follow-up Visit  2023      Sienna Bennett  MRN: [id number]   : 1950        DIAGNOSIS / ID: Ms. Bennett is a 72 year old female with poor lung function presenting with left upper lobe nodule, most consistent with a [id number] malignancy. Plan for SBRT to VARSHA.       INTENT OF RADIOTHERAPY: Definitive     CONCURRENT SYSTEMIC THERAPY: No               SITE OF TREATMENT: VARSHA     DATES  OF TREATMENT: - 9/15/23     TOTAL DOSE OF TREATMENT / FRACTIONS: 50Gy/5fx    INTERVAL SINCE COMPLETION OF RADIATION THERAPY: 6 weeks    SUBJECTIVE: Overall, she is doing well since completing radiation. Denies chest pain, hemoptysis, weight loss, neurologic deficit, dyspnea.     PHYSICAL EXAM:  BP (!) 149/77 (BP Location: Left arm, Patient Position: Chair, Cuff Size: Adult Small)   Pulse 95   Temp 98.7  F (37.1  C) (Oral)   Resp 18   Wt 49.9 kg (110 lb)   SpO2 95%   BMI 19.49 kg/m    Gen: Alert, in NAD  Eyes: PERRL, EOMI, sclera anicteric  HENT     Head: NC/AT     Ears: No external auricular lesions     Nose/sinus: No rhinorrhea or epistaxis     Oral Cavity/Oropharynx: MMM  Neck: Supple, full ROM, no LAD  Pulm: No wheezing, stridor or respiratory distress  CV: Well-perfused, no cyanosis, no pedal edema  Abdominal: Soft, nontender, nondistended, no hepatomegaly  Back: No step-offs or pain to palpation along the thoracolumbar spine, no CVA tenderness  Rectal/: Deferred  Musculoskeletal: Normal bulk and tone   Skin: Normal color and turgor  Neurologic: A/Ox3, CN II-XII intact  Psychiatric: Appropriate mood and affect    LABS AND IMAGING:  Reviewed.    PET/CT 23 (prior to SBRT)        IMPRESSION: Overall, patient is doing well from a radiation acute toxicity perspective.    PLAN:   CT Chest 3  months post RT    Peter Gillis M.D.  Department of Radiation Oncology  [facility]

## 2023-11-02 NOTE — LETTER
2023         RE: Sienna Bennett  [address]  [place] [address]        Dear Colleague,    Thank you for referring your patient, Sienna Bennett, to the [facility] RADIATION ONCOLOGY [place]. Please see a copy of my visit note below.         Department of Radiation Oncology  [facility]    Health: Cancer Center  [facility]  [address] [address] [place] [address] [phone]       Radiation Oncology Follow-up Visit  2023      Sienna Bennett  MRN: [id number]   : 1950        DIAGNOSIS / ID: Ms. Bennett is a 72 year old female with poor lung function presenting with left upper lobe nodule, most consistent with a [id number] malignancy. Plan for SBRT to VARSHA.       INTENT OF RADIOTHERAPY: Definitive     CONCURRENT SYSTEMIC THERAPY: No               SITE OF TREATMENT: VARSHA     DATES  OF TREATMENT: - 9/15/23     TOTAL DOSE OF TREATMENT / FRACTIONS: 50Gy/5fx    INTERVAL SINCE COMPLETION OF RADIATION THERAPY: 6 weeks    SUBJECTIVE: Overall, she is doing well since completing radiation. Denies chest pain, hemoptysis, weight loss, neurologic deficit, dyspnea.     PHYSICAL EXAM:  BP (!) 149/77 (BP Location: Left arm, Patient Position: Chair, Cuff Size: Adult Small)   Pulse 95   Temp 98.7  F (37.1  C) (Oral)   Resp 18   Wt 49.9 kg (110 lb)   SpO2 95%   BMI 19.49 kg/m    Gen: Alert, in NAD  Eyes: PERRL, EOMI, sclera anicteric  HENT     Head: NC/AT     Ears: No external auricular lesions     Nose/sinus: No rhinorrhea or epistaxis     Oral Cavity/Oropharynx: MMM  Neck: Supple, full ROM, no LAD  Pulm: No wheezing, stridor or respiratory distress  CV: Well-perfused, no cyanosis, no pedal edema  Abdominal: Soft, nontender, nondistended, no hepatomegaly  Back: No step-offs or pain to palpation along the thoracolumbar spine, no CVA tenderness  Rectal/: Deferred  Musculoskeletal: Normal bulk and tone   Skin: Normal color  and turgor  Neurologic: A/Ox3, CN II-XII intact  Psychiatric: Appropriate mood and affect    LABS AND IMAGING:  Reviewed.    PET/CT 8/9/23 (prior to SBRT)        IMPRESSION: Overall, patient is doing well from a radiation acute toxicity perspective.    PLAN:   CT Chest 3 months post RT    Peter Gillis M.D.  Department of Radiation Oncology  [facility]         Again, thank you for allowing me to participate in the care of your patient.        Sincerely,        Peter Gillis MD

## 2023-11-02 NOTE — NURSING NOTE
"RADIATION ONCOLOGY LUNG FOLLOW-UP VISIT    Patient Name: Sienna Bennett      : 1950     Age: 73 year old        ______________________________________________________________________________       Chief Complaint   Patient presents with    Cancer     Radiation oncology return visit with Dr. Gillis     BP (!) 149/77 (BP Location: Left arm, Patient Position: Chair, Cuff Size: Adult Small)   Pulse 95   Temp 98.7  F (37.1  C) (Oral)   Resp 18   Wt 49.9 kg (110 lb)   SpO2 95%   BMI 19.49 kg/m        History of Radiation Treatment:  Site: [facility] - SBRT  Total Dose: 5,000 cGy  Date Completed: 9/15/2023  Clinic: [facility]  Physician: Dr. Peter Gillis    Pain:  Patient reports low back pain, intermittent since completing kyphoplasty \"4/10\" at current visit.    Imaging:  None    Fatigue:   Grade 0: No toxicity    Skin:  No Concerns      Cough/Shortness of Breath:  At baseline per patient report, patient reports visit with PCP next week for possible upper respiratory infection s/p travel    Swallowing:  No Concerns    Use of Supplemental Oxygen:  No        Future Appointments:      SARA Roche Appointment Date: 2023    Appointment Date:     Appointment Date:         Additional Instructions: Return approximately 12/15/2023 for CT Chest and visit with Dr. Gillis    Nurse face-to-face time: Level 4:  15 min face to face time.    Li Crowe, RN BSN OCN CBCN     "

## 2023-11-07 ENCOUNTER — VIRTUAL VISIT (OUTPATIENT)
Dept: FAMILY MEDICINE | Facility: CLINIC | Age: 73
End: 2023-11-07
Payer: COMMERCIAL

## 2023-11-07 DIAGNOSIS — J20.9 ACUTE BRONCHITIS, UNSPECIFIED ORGANISM: Primary | ICD-10-CM

## 2023-11-07 DIAGNOSIS — Z13.29 SCREENING FOR THYROID DISORDER: ICD-10-CM

## 2023-11-07 PROCEDURE — 99213 OFFICE O/P EST LOW 20 MIN: CPT | Mod: VID | Performed by: PHYSICIAN ASSISTANT

## 2023-11-07 RX ORDER — AZITHROMYCIN 250 MG/1
TABLET, FILM COATED ORAL
Qty: 6 TABLET | Refills: 0 | Status: SHIPPED | OUTPATIENT
Start: 2023-11-07 | End: 2023-11-12

## 2023-11-07 RX ORDER — BENZONATATE 200 MG/1
200 CAPSULE ORAL 3 TIMES DAILY PRN
Qty: 60 CAPSULE | Refills: 0 | Status: SHIPPED | OUTPATIENT
Start: 2023-11-07 | End: 2024-05-15

## 2023-11-07 NOTE — PROGRESS NOTES
Instructions Relayed to Patient by Virtual Roomer:         Reminded patient to ensure they were logged on to virtual visit by arrival time listed. Documented in appointment notes if patient had flexibility to initiate visit sooner than arrival time. If pediatric virtual visit, ensured pediatric patient along with parent/guardian will be present for video visit.     Patient offered the website [email or web address] and/or phone number to [employer] Help line: [phone]      Kelsea is a 73 year old who is being evaluated via a billable video visit.      How would you like to obtain your AVS? Scopelechart  If the video visit is dropped, the invitation should be resent by: Text to cell phone: [phone]  Will anyone else be joining your video visit? No          Assessment & Plan     Acute bronchitis, unspecified organism  Will treat with zpack and another zpack to take on cruise.  Refilled tessalon perles- very helpful in past   - azithromycin (ZITHROMAX) 250 MG tablet  Dispense: 6 tablet; Refill: 0  - azithromycin (ZITHROMAX) 250 MG tablet  Dispense: 6 tablet; Refill: 0  - benzonatate (TESSALON) 200 MG capsule  Dispense: 60 capsule; Refill: 0    Screening for thyroid disorder  TSH one year ago was a bit elevated-normal range 9 months ago- have been monitoring -will check again given extended cruise upcoming - has had blood sugar checked prior to kyphoplasty   - TSH with free T4 reflex      Ordering of each unique test  Prescription drug management         Patient Instructions   Take course of zithromax now and as needed while on your cruise.  Take tessalon three times a day as needed for cough   Return urgently if any change in symptoms.    Schedule nonfasting lab only appointment to recheck your thyroid function.    Schedule your annual exam with Dr. Leonard Roche PA-C  [facility]   Kelsea is a 73 year old, presenting for the following health  issues:  Results      11/7/2023    10:08 AM   Additional Questions   Roomed by anant       History of Present Illness       Reason for visit:  Follow up blood work    She eats 2-3 servings of fruits and vegetables daily.She consumes 0 sweetened beverage(s) daily.She exercises with enough effort to increase her heart rate 20 to 29 minutes per day.  She exercises with enough effort to increase her heart rate 4 days per week.   She is taking medications regularly.           Patient unfamiliar to me with history of COPD, thrombocytopenia, CAD and recent compression fracture of L4 vertebrae presents for couple concerns.  Fighting upper respiratory infection for about 3 weeks.  COPD has a lot to do with that and requests antibiotic.  Reports that she returned from cruise couple  weeks ago.  Cough productive with green sputum for most of it .  No fever.  Just constant infection stuff. Zpack has worked well in the past,  has upcoming appointment with her pulmonologist next month and at one time he talked about putting her on a prophylactic antibiotic- she will discuss with him   I had a bad upper respiratory infection in May (6  months ago) and took a couple different rounds of antiibiotics and unsure if never cleared up or a new infection.  Not struggling with shortness of breath except with exertion which is unchanged for her.  COPD -   When have issues uses rescue inhaler and get over the edge.  Underwent L4 Kyphoplasty 10/11/23 and reports they minimized the amount the pain.      Leaves January 3 for 4 month cruise.  Because of history with upper respiratory infection requesting a prescription for zpack and tessalon perles to take with in case she would develop an upper respiratory infection on the cruise  Cruised a couple years ago. And Dr. Valle sent home with prescription to take on the cruise    Has upcoming appointment with her Cardiologist -had a bout of hypertension -   Will be traveling the world.  Does  have medical care on the cruise ship but they have recommended to take printed prescriptions with them in case something happens to their medications and then they can get refilled if needed.   If prescriptions- take enough medications to make it through  Plans to complete vaccines needed by getting one at a time every couple weeks.  Last time did covid and flu at same time suffered    Has had a really tough time with things.  Went on a cruise-mom got sick and was placed in hospice and   Ct nodule growing- tiny- completed radiation- resection was not a good plan given her COPD   Completed radiation.  No side effects with radiation.    Go back next month for CT-plan to CT every 3 months for a year     Review of Systems   Constitutional, HEENT, cardiovascular, pulmonary, gi and gu systems are negative, except as otherwise noted.      Objective           Vitals:  No vitals were obtained today due to virtual visit.    Physical Exam   GENERAL: Healthy, alert and no distress  EYES: Eyes grossly normal to inspection.  No discharge or erythema, or obvious scleral/conjunctival abnormalities.  RESP: No audible wheeze, has a cough, no visible cyanosis.  No visible retractions or increased work of breathing.    SKIN: Visible skin clear. No significant rash, abnormal pigmentation or lesions.  NEURO: Cranial nerves grossly intact.  Mentation and speech appropriate for age.  PSYCH: Mentation appears normal, affect normal/bright, judgement and insight intact, normal speech and appearance well-groomed.                Video-Visit Details    Type of service:  Video Visit     Originating Location (pt. Location): Home    Distant Location (provider location):  On-site  Platform used for Video Visit: [employer] ([email or web address])

## 2023-11-07 NOTE — PROGRESS NOTES
"  Subjective:    Sienna Bennett is a 73 year old female who presents today for follow-up regarding   L4 compression fracture approximately early August 2023.  She was sent for a TLSO, and a consult with IR for kyphoplasty, and was sent to bone density clinic.    PRIOR HISTORY from   9/20/2023:  She was seen forevaluation of low back pain L4 compression fracture upon referral from Dr. Kalli Valle based on an 8/16/2023 phone call the recording:  \"Pt called in to req a ref to see a specialist for her back. She recently had a CT scan of her chest and it was noted that she has L4 Compression fracture. See below. She has had back pains since May when she was on a trip she had this cough and recalls having a coughing spell which then caused her to have back pains and at the time she thought it was muscle spasms in her back but is thinking this may have been when she injured her back. She called Nathalie Sexton APRN CNS  office because she can see below results on mychart but has not heard from them since her CT scan and they told her to follow up with her PCP regarding her back issues.\"        History today:  Kelsea is here with her  Jason assisting with the history.  She confirms the above.  The onset of pain was sudden with a cough actually 4 months ago or a little longer.  She has plateaued in her progress but she is definitely better now than she was in the first month.  Turning over in bed sometimes brings on the pain..  Pain is now intermittent and is brought on by twisting and bending.  There is no radiating pain in the legs, no numbness tingling or weakness, no bowel or bladder dysfunction, and no saddle anesthesia.  Is been no change since her PET CT scan in terms of symptoms.  She has never had a back fracture before and she has known osteoporosis.  She has not been referred to the bone density people    INTERIM HISTORY:    Kelsea is here with her  Jason assisting with the history. " "  She underwent her L4 kyphoplasty on 10/11/2023.  Her DEXA scan was reviewed and it shows progression of her spinal osteoporosis and the hips are stable.  She cannot get into the bone density clinic until May and she is leaving with her  for a 4-month round the world cruise in January.  The kyphoplasty afforded her 90% pain relief.  She has a little bit of discomfort in the left SI area but more importantly she has pain in her neck now.  She is interested in some physical therapy.  We talked about precautions for her trip to avoid falls which could put her at high risk of the fracture.  Denies any radiating pains into her arms or legs numbness or weakness.    Past medical and surgical history:   Pertinent for COPD, ex smoker, essential thrombocythemia, lung ma  She ss-likely cancer, heart disease, status post radiation therapy to left upper lobe lung, thrombocythemia on baby aspirin     SOCIAL HX: She and her  have 2 children and 3 grandchildren.  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  Sports hobbies activities: Tennis until the COVID pandemic (3.5 [employer]), hip, \", former skier they are leaving for a cruise to the [facility] in mid October, and a 4-month-world cruise January 2024  Objective:    IMAGING:   Images and report reviewed.     PET and CT on 8/9/2023      Severe L4 compression fracture with associated inflammatory uptake.  Mild uptake along the superior compression deformity of T11 vertebral  body. Mild uptake in the soft tissues posterior to the L3-5 spinous  processes with mild stranding, likely inflammatory.     1. Mildly hypermetabolic 1.1 cm left apical solid pulmonary nodule,  which has gradually increased in size and new since chest CT dated  3/17/2022, concerning for primary pulmonary malignancy.     2. No evidence of virginia disease or distant metastasis.     3. Severe L4 compression fracture (favored osteoporotic) with  associated inflammatory uptake.     4. Additional ancillary findings " as described in the body of the  report.    EXAMINATION:    CONSTITUTIONAL:  Vital signs as above.  No acute distress.  The patient is well nourished and well groomed.  She transitions without pushoff and moves fluidly  PSYCHIATRIC:  The patient is awake, alert, oriented to person, place and time.  The patient is answering questions appropriately with clear speech.  Normal affect.  Able to follow commands  Cervical range of motion is full reproducing her pain with extension sidebending and rotation but not with flexion  Negative tenderness trigger points or spasm in the upper quadrant  NEUROLOGICAL:    Motor: 5/5 in C5-T1 myotomes, axillary/median/radial/ulnar nerves  Sensation to light touch is intact in the torso and upper extremities.      Pelvis: No evidence of Pelvic Joint Dysfunction.  Tender to spring test over the left SI joint.  Right shoulder is lower than the left.    Procedure note-OMT:  Risks and benefits and alternatives discussed and she wished to proceed.  Negative active vertebral artery test.  We talked about the possibility of a fracture.  Manual medicine was done for a FRS left at C5.  This was painless.  Afterwards there was really no significant change in her painful cervical range.    Assessment     Sienna Bennett  is a 73 year old y.o. female who presents today for follow-up regarding   Status post successful L4 kyphoplasty for osteoporotic compression fracture-90% therapeutic response  Neck pain  Segmental dysfunction neck  Osteoporosis  Low back pain with a positive spring test of the left SI joint but no Pelvic Joint Dysfunction      Plan:  She and her  are planning on a 4-month cruise around the world in 2 months.  We talked about an RFA work-up but she is really not interested in injections and I think it is more likely that this is muscular rather than joint generated so some standard PT for the neck is ordered.  I am not inclined to recommend a left SI joint injection,.   The last thing her bones need is a little prednisone in her system.  In the meantime, I can see if we can get her into a bone density clinic out of our system before she leaves town.    Advised patient to call or return early if symptoms worsen, or having problems controlling bladder and bowel function or worsening leg weakness.     Please note: Voice recognition software was used in this dictation.  It may therefore contain typographical errors.  Solitario Ortiz MD

## 2023-11-07 NOTE — PATIENT INSTRUCTIONS
Take course of zithromax now and as needed while on your cruise.  Take tessalon three times a day as needed for cough   Return urgently if any change in symptoms.    Schedule nonfasting lab only appointment to recheck your thyroid function.    Schedule your annual exam with Dr. Valle

## 2023-11-08 ENCOUNTER — LAB (OUTPATIENT)
Dept: LAB | Facility: CLINIC | Age: 73
End: 2023-11-08
Payer: COMMERCIAL

## 2023-11-08 DIAGNOSIS — Z13.29 SCREENING FOR THYROID DISORDER: ICD-10-CM

## 2023-11-08 LAB — TSH SERPL DL<=0.005 MIU/L-ACNC: 3.22 UIU/ML (ref 0.3–4.2)

## 2023-11-08 PROCEDURE — 84443 ASSAY THYROID STIM HORMONE: CPT

## 2023-11-08 PROCEDURE — 36415 COLL VENOUS BLD VENIPUNCTURE: CPT

## 2023-11-09 NOTE — RESULT ENCOUNTER NOTE
Dear Kelsea  Your thyroid function was normal.  Please call or MyChart my office with any questions or concerns.   Charlee Roche, PAC

## 2023-11-13 ENCOUNTER — MYC MEDICAL ADVICE (OUTPATIENT)
Dept: ONCOLOGY | Facility: CLINIC | Age: 73
End: 2023-11-13

## 2023-11-13 ENCOUNTER — ANCILLARY PROCEDURE (OUTPATIENT)
Dept: BONE DENSITY | Facility: CLINIC | Age: 73
End: 2023-11-13
Attending: PREVENTIVE MEDICINE
Payer: COMMERCIAL

## 2023-11-13 DIAGNOSIS — S32.040A COMPRESSION FRACTURE OF L4 VERTEBRA, INITIAL ENCOUNTER (H): ICD-10-CM

## 2023-11-13 DIAGNOSIS — M80.00XA AGE-RELATED OSTEOPOROSIS WITH CURRENT PATHOLOGICAL FRACTURE, INITIAL ENCOUNTER: ICD-10-CM

## 2023-11-13 PROCEDURE — 77080 DXA BONE DENSITY AXIAL: CPT | Performed by: RADIOLOGY

## 2023-11-14 ENCOUNTER — OFFICE VISIT (OUTPATIENT)
Dept: NEUROSURGERY | Facility: CLINIC | Age: 73
End: 2023-11-14
Payer: COMMERCIAL

## 2023-11-14 VITALS
BODY MASS INDEX: 19.49 KG/M2 | HEART RATE: 109 BPM | DIASTOLIC BLOOD PRESSURE: 76 MMHG | WEIGHT: 110 LBS | HEIGHT: 63 IN | SYSTOLIC BLOOD PRESSURE: 120 MMHG

## 2023-11-14 DIAGNOSIS — G89.29 CHRONIC LEFT-SIDED LOW BACK PAIN WITHOUT SCIATICA: ICD-10-CM

## 2023-11-14 DIAGNOSIS — S32.040A COMPRESSION FRACTURE OF L4 VERTEBRA, INITIAL ENCOUNTER (H): ICD-10-CM

## 2023-11-14 DIAGNOSIS — M99.01 SEGMENTAL DYSFUNCTION OF CERVICAL REGION: ICD-10-CM

## 2023-11-14 DIAGNOSIS — M54.50 CHRONIC LEFT-SIDED LOW BACK PAIN WITHOUT SCIATICA: ICD-10-CM

## 2023-11-14 DIAGNOSIS — M54.2 NECK PAIN: Primary | ICD-10-CM

## 2023-11-14 PROCEDURE — 99215 OFFICE O/P EST HI 40 MIN: CPT | Mod: 25 | Performed by: PREVENTIVE MEDICINE

## 2023-11-14 PROCEDURE — 98925 OSTEOPATH MANJ 1-2 REGIONS: CPT | Performed by: PREVENTIVE MEDICINE

## 2023-11-14 ASSESSMENT — PAIN SCALES - GENERAL: PAINLEVEL: MODERATE PAIN (4)

## 2023-11-14 NOTE — PATIENT INSTRUCTIONS
Kelsea I am really glad that the kyphoplasty helped.  Lets see if a little physical therapy tunes up your neck.  I do not want to inject your back.  Hope you have a wonderful trip and be careful not to fall.  Let me know if I can be of any further service to you.      Assessment     Sienna Bennett  is a 73 year old y.o. female who presents today for follow-up regarding   Status post successful L4 kyphoplasty for osteoporotic compression fracture-90% therapeutic response  Neck pain  Segmental dysfunction neck  Osteoporosis  Low back pain with a positive spring test of the left SI joint but no Pelvic Joint Dysfunction      Plan:  She and her  are planning on a 4-month cruise around the world in 2 months.  We talked about an RFA work-up but she is really not interested in injections and I think it is more likely that this is muscular rather than joint generated so some standard PT for the neck is ordered.  I am not inclined to recommend a left SI joint injection,.  The last thing her bones need is a little prednisone in her system.  In the meantime, I can see if we can get her into a bone density clinic out of our system before she leaves town.

## 2023-11-14 NOTE — NURSING NOTE
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 No.  Work status?  Retired.    Sports: n  Activities: walking             /76   Pulse 109   Ht 1.588 m (5' 2.5\")   Wt 49.9 kg (110 lb)   BMI 19.80 kg/m        Allergies   Allergen Reactions    Environmental [Adhesive Tape]     Wellbutrin [Bupropion]        Current Outpatient Medications   Medication    aspirin 81 MG EC tablet    benzonatate (TESSALON) 200 MG capsule    fluticasone (FLONASE) 50 MCG/ACT nasal spray    Fluticasone-Umeclidin-Vilanterol (TRELEGY ELLIPTA) 200-62.5-25 MCG/ACT oral inhaler    guaiFENesin (MUCINEX) 600 MG 12 hr tablet    hydroxyurea (HYDREA) 500 MG capsule    levalbuterol (XOPENEX HFA) 45 MCG/ACT inhaler    Probiotic Product (PROBIOTIC DAILY PO)    rosuvastatin (CRESTOR) 20 MG tablet     No current facility-administered medications for this visit.         Darla Severin-Brown, LPN   "

## 2023-11-14 NOTE — LETTER
"    11/14/2023         RE: Sienna Bennett  [address]  [place] [address]        Dear Colleague,    Thank you for referring your patient, Sienna Bennett, to the [facility] [place]. Please see a copy of my visit note below.      Subjective:    Sienna Bennett is a 73 year old female who presents today for follow-up regarding   L4 compression fracture approximately early August 2023.  She was sent for a TLSO, and a consult with IR for kyphoplasty, and was sent to bone density clinic.    PRIOR HISTORY from   9/20/2023:  She was seen forevaluation of low back pain L4 compression fracture upon referral from Dr. Kalli Valle based on an 8/16/2023 phone call the recording:  \"Pt called in to req a ref to see a specialist for her back. She recently had a CT scan of her chest and it was noted that she has L4 Compression fracture. See below. She has had back pains since May when she was on a trip she had this cough and recalls having a coughing spell which then caused her to have back pains and at the time she thought it was muscle spasms in her back but is thinking this may have been when she injured her back. She called Nathalie Sexton APRN CNS  office because she can see below results on mychart but has not heard from them since her CT scan and they told her to follow up with her PCP regarding her back issues.\"        History today:  Kelsea is here with her  Jason assisting with the history.  She confirms the above.  The onset of pain was sudden with a cough actually 4 months ago or a little longer.  She has plateaued in her progress but she is definitely better now than she was in the first month.  Turning over in bed sometimes brings on the pain..  Pain is now intermittent and is brought on by twisting and bending.  There is no radiating pain in the legs, no numbness tingling or weakness, no bowel or bladder dysfunction, and no saddle anesthesia. " " Is been no change since her PET CT scan in terms of symptoms.  She has never had a back fracture before and she has known osteoporosis.  She has not been referred to the bone density people    INTERIM HISTORY:    Kelsea is here with her  Jason assisting with the history.   She underwent her L4 kyphoplasty on 10/11/2023.  Her DEXA scan was reviewed and it shows progression of her spinal osteoporosis and the hips are stable.  She cannot get into the bone density clinic until May and she is leaving with her  for a 4-month round the world cruise in January.  The kyphoplasty afforded her 90% pain relief.  She has a little bit of discomfort in the left SI area but more importantly she has pain in her neck now.  She is interested in some physical therapy.  We talked about precautions for her trip to avoid falls which could put her at high risk of the fracture.  Denies any radiating pains into her arms or legs numbness or weakness.    Past medical and surgical history:   Pertinent for COPD, ex smoker, essential thrombocythemia, lung ma  She ss-likely cancer, heart disease, status post radiation therapy to left upper lobe lung, thrombocythemia on baby aspirin     SOCIAL HX: She and her  have 2 children and 3 grandchildren.  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  Sports hobbies activities: Tennis until the COVID pandemic (3.5 [employer]), hip, \", former skier they are leaving for a cruise to the [facility] in mid October, and a 4-month-world cruise January 2024  Objective:    IMAGING:   Images and report reviewed.     PET and CT on 8/9/2023      Severe L4 compression fracture with associated inflammatory uptake.  Mild uptake along the superior compression deformity of T11 vertebral  body. Mild uptake in the soft tissues posterior to the L3-5 spinous  processes with mild stranding, likely inflammatory.     1. Mildly hypermetabolic 1.1 cm left apical solid pulmonary nodule,  which has gradually increased in size and " new since chest CT dated  3/17/2022, concerning for primary pulmonary malignancy.     2. No evidence of virginia disease or distant metastasis.     3. Severe L4 compression fracture (favored osteoporotic) with  associated inflammatory uptake.     4. Additional ancillary findings as described in the body of the  report.    EXAMINATION:    CONSTITUTIONAL:  Vital signs as above.  No acute distress.  The patient is well nourished and well groomed.  She transitions without pushoff and moves fluidly  PSYCHIATRIC:  The patient is awake, alert, oriented to person, place and time.  The patient is answering questions appropriately with clear speech.  Normal affect.  Able to follow commands  Cervical range of motion is full reproducing her pain with extension sidebending and rotation but not with flexion  Negative tenderness trigger points or spasm in the upper quadrant  NEUROLOGICAL:    Motor: 5/5 in C5-T1 myotomes, axillary/median/radial/ulnar nerves  Sensation to light touch is intact in the torso and upper extremities.      Pelvis: No evidence of Pelvic Joint Dysfunction.  Tender to spring test over the left SI joint.  Right shoulder is lower than the left.    Procedure note-OMT:  Risks and benefits and alternatives discussed and she wished to proceed.  Negative active vertebral artery test.  We talked about the possibility of a fracture.  Manual medicine was done for a FRS left at C5.  This was painless.  Afterwards there was really no significant change in her painful cervical range.    Assessment     Sienna Bennett  is a 73 year old y.o. female who presents today for follow-up regarding   Status post successful L4 kyphoplasty for osteoporotic compression fracture-90% therapeutic response  Neck pain  Segmental dysfunction neck  Osteoporosis  Low back pain with a positive spring test of the left SI joint but no Pelvic Joint Dysfunction      Plan:  She and her  are planning on a 4-month cruise around the world in  2 months.  We talked about an RFA work-up but she is really not interested in injections and I think it is more likely that this is muscular rather than joint generated so some standard PT for the neck is ordered.  I am not inclined to recommend a left SI joint injection,.  The last thing her bones need is a little prednisone in her system.  In the meantime, I can see if we can get her into a bone density clinic out of our system before she leaves town.    Advised patient to call or return early if symptoms worsen, or having problems controlling bladder and bowel function or worsening leg weakness.     Please note: Voice recognition software was used in this dictation.  It may therefore contain typographical errors.  Solitario Ortiz MD      Again, thank you for allowing me to participate in the care of your patient.        Sincerely,        Solitario Ortiz MD

## 2023-11-20 ENCOUNTER — TRANSFERRED RECORDS (OUTPATIENT)
Dept: HEALTH INFORMATION MANAGEMENT | Facility: CLINIC | Age: 73
End: 2023-11-20
Payer: COMMERCIAL

## 2023-11-27 ENCOUNTER — THERAPY VISIT (OUTPATIENT)
Dept: PHYSICAL THERAPY | Facility: CLINIC | Age: 73
End: 2023-11-27
Payer: COMMERCIAL

## 2023-11-27 ENCOUNTER — TELEPHONE (OUTPATIENT)
Dept: FAMILY MEDICINE | Facility: CLINIC | Age: 73
End: 2023-11-27

## 2023-11-27 ENCOUNTER — TELEPHONE (OUTPATIENT)
Dept: ENDOCRINOLOGY | Facility: CLINIC | Age: 73
End: 2023-11-27

## 2023-11-27 DIAGNOSIS — S32.040D COMPRESSION FRACTURE OF L4 VERTEBRA WITH ROUTINE HEALING: ICD-10-CM

## 2023-11-27 DIAGNOSIS — G89.29 CHRONIC BILATERAL LOW BACK PAIN WITHOUT SCIATICA: ICD-10-CM

## 2023-11-27 DIAGNOSIS — M81.0 OSTEOPOROSIS, UNSPECIFIED OSTEOPOROSIS TYPE, UNSPECIFIED PATHOLOGICAL FRACTURE PRESENCE: ICD-10-CM

## 2023-11-27 DIAGNOSIS — M54.2 NECK PAIN: Primary | ICD-10-CM

## 2023-11-27 DIAGNOSIS — M54.50 CHRONIC BILATERAL LOW BACK PAIN WITHOUT SCIATICA: ICD-10-CM

## 2023-11-27 PROCEDURE — 97163 PT EVAL HIGH COMPLEX 45 MIN: CPT | Mod: GP

## 2023-11-27 PROCEDURE — 97110 THERAPEUTIC EXERCISES: CPT | Mod: GP

## 2023-11-27 NOTE — TELEPHONE ENCOUNTER
Would definitely recommend endocrinology visit before jumping to parathyroid removal.  Many things can cause an elevated PTH level that do not need to be treated with surgery.  If she can get in with endocrinology in a different location sooner than planned within [facility] that would be a great option.    We do not have a way to update her once records are received so I would encourage her to check back with us in a few weeks.  It typically takes at least 3 weeks to get medical records.

## 2023-11-27 NOTE — PROGRESS NOTES
PHYSICAL THERAPY EVALUATION  Type of Visit: Evaluation    See electronic medical record for Abuse and Falls Screening details.    Subjective       Presenting condition or subjective complaint: Neck pain  Date of onset: 10/11/23    Relevant medical history: Cancer; COPD; Hearing problems; History of fractures; Osteoporosis; Radiation treatment; Smoking; Thyroid problems   Dates & types of surgery: Thyroid surgery, 40+years ago/Kyphoplasty 10-11-23    Prior diagnostic imaging/testing results: MRI; CT scan; Bone scan     Prior therapy history for the same diagnosis, illness or injury: No      Prior Level of Function  Transfers:   Ambulation:   ADL:   IADL:     Living Environment  Social support: With a significant other or spouse   Type of home: House   Stairs to enter the home: Yes 3 Is there a railing: Yes   Ramp: No   Stairs inside the home: Yes 12 Is there a railing: Yes   Help at home: Home management tasks (cooking, cleaning); Home and Yard maintenance tasks  Equipment owned:       Employment: No    Hobbies/Interests: Reading, knitting, sewing, traveling    Patient goals for therapy: Reduce neck pain    Subjective Summary: Patient reports that she found that in regards to her bone density she found that her parathyroid is out of whack and may need to be removed, but she is having difficulty getting into an endocrinologist.    She reports having chronic low back pain that occurred from a trip in [place] potentially caused by coughing or lifting luggage. Imaging later found a severe L4 fracture. She says she has been told that potentially there is a portion of the bone that may be pushing on the nerve or spine that may be recreating her pain.    In regards to her neck, reading, sewing, twisting her neck and sleeping positions at night are the most bothersome. Walking for prolonged periods irritates her neck in a fatiguing type of way. Her neck pain is her biggest concern and she will be away for 4 months.    Pain  assessment: Location: Neck Pain /Rating: Most severe     Objective   CERVICAL SPINE EVALUATION  PAIN: Pain is Exacerbated By: Rotation, Flexion, Extension, Reading, Sewing  INTEGUMENTARY (edema, incisions):   POSTURE:   GAIT:   Weightbearing Status:   Assistive Device(s):   Gait Deviations:   BALANCE/PROPRIOCEPTION:   WEIGHTBEARING ALIGNMENT:   ROM:  Cervical Flexion: Moderate Limitation with pain in the spine at end range.  Cervical Extension: Moderate limitation with severe pain in the cervical spine at end range.  Left Side Bend: Moderate limitation with Pain in the spine at end range, Right Side Bend: Moderate limitation with Stretch and tightness in the left upper trap.  Right Rotation: 35 degrees rotation.  Left Rotation: 44 degrees    MYOTOMES: WNL  All generalized 4+/5 with no radicular symptoms or reported weaknesses. No strength test weaknesses expected with myotomal strength loss  DTR S:   CORD SIGNS:   DERMATOMES:   NEURAL TENSION:   FLEXIBILITY:    SPECIAL TESTS:   PALPATION:   SPINAL SEGMENTAL CONCLUSIONS:       Assessment & Plan   CLINICAL IMPRESSIONS  Medical Diagnosis: Compression fracture of L4 vertebra, initial encounter (H).  Neck Pain    Treatment Diagnosis: Neck Pain, Low Back Pain, Osteoporosis, History of Compression Fracture   Impression/Assessment: Patient is a 73 year old female with Neck Pain, Low Back Pain, Osteoporosis, History of Compression Fracture complaints.  The following significant findings have been identified: Pain, Decreased ROM/flexibility, Decreased strength, Impaired balance, and Impaired gait. These impairments interfere with their ability to perform self care tasks, work tasks, recreational activities, household chores, driving , household mobility, and community mobility as compared to previous level of function.     Clinical Decision Making (Complexity):  Clinical Presentation: Unstable/Unpredictable   Clinical Presentation Rationale: based on medical and personal  factors listed in PT evaluation  Clinical Decision Making (Complexity): High complexity    PLAN OF CARE  Treatment Interventions:  Interventions: Gait Training, Manual Therapy, Neuromuscular Re-education, Therapeutic Activity, Therapeutic Exercise    Long Term Goals     PT Goal 1  Goal Identifier: Neck Pain  Goal Description: Will be able to rotate her neck left and right to allow her to drive safely looking into her blind spots with 50 degrees of rotation bilaterally without pain worse than 5/10 as well as will be able to read and sew for 30 minutes without pain worse than 5/10  Rationale: to maximize safety and independence with performance of ADLs and functional tasks;to maximize safety and independence within the home;to maximize safety and independence within the community;to maximize safety and independence with transportation;to maximize safety and independence with self cares  Goal Progress: Currently gets pain up to 9/10 in the neck with driving, laying, reading and sewing  Target Date: 01/22/24  PT Goal 2  Goal Identifier: Low Back Pain  Goal Description: Patient will have a home exercise program that she is independent with that she can do to offer weight bearing and weighted strengthening to support her bone density and osteoporosis as well as calm her low back pain to less than 4/10 independently  Rationale: to maximize safety and independence with performance of ADLs and functional tasks;to maximize safety and independence within the home;to maximize safety and independence within the community;to maximize safety and independence with self cares;to maximize safety and independence with transportation  Goal Progress: Currently has no exercises and the advice she has been given leaves her cautious if she can really do any movement  Target Date: 01/22/24      Frequency of Treatment: 1x a week  Duration of Treatment: 8 weeks    Recommended Referrals to Other Professionals:   Education Assessment:         Risks and benefits of evaluation/treatment have been explained.   Patient/Family/caregiver agrees with Plan of Care.     Evaluation Time:     PT Courtneyal, High Complexity Minutes (30743): 37       Signing Clinician: Pasha Elias PT

## 2023-11-27 NOTE — TELEPHONE ENCOUNTER
Called and spoke with patient. Patient reported that she will be having information faxed over. Was seen at [facility] clinic in [place]. Was seen by Sanjuana Flowers CNP.     Reports that she has not seen an endocrinologist. Was seen at this clinic for back compression. Reports that she had some labs completed and they noted that PTH levels were elevated. Was advised by provider that elevated PTH levels can indicate hyperparathyroidism, which can cause osteoporosis. Was advised by this provider to have parathyroid removed. Patient wanted to have this completed through [facility]. However, will be leaving to go out of the country for four months in January. Reports that she cannot wait that long and wants to get this figured out before she leaves.    Patient reports that while seeing provider in [place], they reported that they could place referral for patient to see an endocrinologist. Patient leaning more towards doing this since she knows she could be seen before January with an outside referral.     Patient requesting a call back once clinic receives records. Patient has a physical therapy appointment at 4 PM today so won't be available between 3:45-5 PM today.     Routing to provider to review and advise.     Sharon Rojas, PHYLLISN, RN   [facility]

## 2023-11-27 NOTE — TELEPHONE ENCOUNTER
I guess I would need to see the details of her evaluation.  Typically we will have patient's meet with an endocrinologist to determine if parathyroid surgery is needed.  I see she is scheduled with an endocrinologist in the future.  Did she already see one at the [facility]?

## 2023-11-27 NOTE — TELEPHONE ENCOUNTER
Patient Returning Call    Reason for call:  Kelsea recently went to a bone density clinic in [place], where she was given a referral to see ENT or Endocrinologist to get surgery done to remove parathyroid. She would like to get this done at [place], she will be contacting [place] to fax over all the information that they have. She would like to get a call back from pcp to discuss what her next steps should be.    Information relayed to patient:  They will give you a call back once they receive this message.    Patient has additional questions:  No      Could we send this information to you in [facility] or would you prefer to receive a phone call?:   Patient would prefer a phone call   Okay to leave a detailed message?: Yes at Cell number on file:    Telephone Information:   Mobile [phone]

## 2023-11-27 NOTE — TELEPHONE ENCOUNTER
Spoke with pt and rescheduled 5/9 appt with Dr. Carbone to 6/13 with Dr. Edilson Champion on 11/27/2023 at 9:56 AM

## 2023-11-27 NOTE — TELEPHONE ENCOUNTER
RN called patient and LVM to call clinic at [phone].      If patient calls back, please relay provider message below.     Sharon Rojas RN

## 2023-11-28 NOTE — TELEPHONE ENCOUNTER
Pt returned call to clinic. RN relayed provider's 11/27/2023  3:27 PM message below as written.     Pt states she understands the message from PCP, but states that her initial request had actually been to get a recommendation for the name of a specific provider in the appropriate specialty to put onto a referral to specialty. Pt states the provider she saw in [place] is willing to refer pt anywhere, but had requested that pt find the specific name of who she wants to be referred to.     RN advised that the specialty department referrals placed by [facility] are typically referrals to a department and not a specific provider in the department. Patients are then scheduled with an appropriate available provider within the specialty department based on the dx and scheduling guidelines.    Pt states the provider she saw in [place] had informed her they could get her into the appropriate specialist in [place] relatively quickly compared to the availability pt has found elsewhere. Pt states she will call the provider in [place] back and proceed with scheduling there more quickly before her planned travels out of the country.     RN encouraged pt to call back asap if a surgery date is scheduled so that [facility] staff can assist her with obtaining an appointment for a pre-op physical within 30 days of a scheduled surgery. Pt indicates understanding of issues and agrees with the plan.    CEDRIC [place], RN

## 2023-12-01 ENCOUNTER — MYC MEDICAL ADVICE (OUTPATIENT)
Dept: FAMILY MEDICINE | Facility: CLINIC | Age: 73
End: 2023-12-01
Payer: COMMERCIAL

## 2023-12-04 ENCOUNTER — THERAPY VISIT (OUTPATIENT)
Dept: PHYSICAL THERAPY | Facility: CLINIC | Age: 73
End: 2023-12-04
Payer: COMMERCIAL

## 2023-12-04 DIAGNOSIS — G89.29 CHRONIC BILATERAL LOW BACK PAIN WITHOUT SCIATICA: ICD-10-CM

## 2023-12-04 DIAGNOSIS — M54.2 NECK PAIN: Primary | ICD-10-CM

## 2023-12-04 DIAGNOSIS — S32.040D COMPRESSION FRACTURE OF L4 VERTEBRA WITH ROUTINE HEALING: ICD-10-CM

## 2023-12-04 DIAGNOSIS — M81.0 OSTEOPOROSIS, UNSPECIFIED OSTEOPOROSIS TYPE, UNSPECIFIED PATHOLOGICAL FRACTURE PRESENCE: ICD-10-CM

## 2023-12-04 DIAGNOSIS — M54.50 CHRONIC BILATERAL LOW BACK PAIN WITHOUT SCIATICA: ICD-10-CM

## 2023-12-04 PROCEDURE — 97110 THERAPEUTIC EXERCISES: CPT | Mod: GP

## 2023-12-11 ENCOUNTER — THERAPY VISIT (OUTPATIENT)
Dept: PHYSICAL THERAPY | Facility: CLINIC | Age: 73
End: 2023-12-11
Payer: COMMERCIAL

## 2023-12-11 DIAGNOSIS — M54.2 NECK PAIN: Primary | ICD-10-CM

## 2023-12-11 DIAGNOSIS — S32.040D COMPRESSION FRACTURE OF L4 VERTEBRA WITH ROUTINE HEALING: ICD-10-CM

## 2023-12-11 DIAGNOSIS — G89.29 CHRONIC BILATERAL LOW BACK PAIN WITHOUT SCIATICA: ICD-10-CM

## 2023-12-11 DIAGNOSIS — M54.50 CHRONIC BILATERAL LOW BACK PAIN WITHOUT SCIATICA: ICD-10-CM

## 2023-12-11 DIAGNOSIS — M81.0 OSTEOPOROSIS, UNSPECIFIED OSTEOPOROSIS TYPE, UNSPECIFIED PATHOLOGICAL FRACTURE PRESENCE: ICD-10-CM

## 2023-12-11 PROCEDURE — 97110 THERAPEUTIC EXERCISES: CPT | Mod: GP

## 2023-12-12 ENCOUNTER — ANCILLARY PROCEDURE (OUTPATIENT)
Dept: CT IMAGING | Facility: CLINIC | Age: 73
End: 2023-12-12
Attending: SURGERY
Payer: COMMERCIAL

## 2023-12-12 DIAGNOSIS — C34.12 PRIMARY MALIGNANT NEOPLASM OF LEFT UPPER LOBE OF LUNG (H): ICD-10-CM

## 2023-12-12 PROCEDURE — 71250 CT THORAX DX C-: CPT | Performed by: RADIOLOGY

## 2023-12-13 ENCOUNTER — OFFICE VISIT (OUTPATIENT)
Dept: RADIATION ONCOLOGY | Facility: CLINIC | Age: 73
End: 2023-12-13
Payer: COMMERCIAL

## 2023-12-13 VITALS
RESPIRATION RATE: 18 BRPM | BODY MASS INDEX: 19.8 KG/M2 | HEART RATE: 98 BPM | SYSTOLIC BLOOD PRESSURE: 149 MMHG | WEIGHT: 110 LBS | DIASTOLIC BLOOD PRESSURE: 83 MMHG | OXYGEN SATURATION: 97 % | TEMPERATURE: 98.7 F

## 2023-12-13 DIAGNOSIS — C34.12 PRIMARY MALIGNANT NEOPLASM OF LEFT UPPER LOBE OF LUNG (H): Primary | ICD-10-CM

## 2023-12-13 PROCEDURE — 99024 POSTOP FOLLOW-UP VISIT: CPT | Performed by: SURGERY

## 2023-12-13 ASSESSMENT — PAIN SCALES - GENERAL: PAINLEVEL: SEVERE PAIN (7)

## 2023-12-13 NOTE — LETTER
2023         RE: Sienna Bennett  [address]  [place] [address]        Dear Colleague,    Thank you for referring your patient, Sienna Bennett, to the [facility] [place]. Please see a copy of my visit note below.         Department of Radiation Oncology  [facility]    Health: Cancer Center  [facility]  [address] [address] [place] [address] [phone]       Radiation Oncology Follow-up Visit  Dec 13, 2023      Sienna Bennett  MRN: [id number]   : 1950        DIAGNOSIS / ID: Ms. Bennett is a 72 year old female with poor lung function presenting with left upper lobe nodule, most consistent with a [id number] malignancy. Plan for SBRT to [facility].       INTENT OF RADIOTHERAPY: Definitive     CONCURRENT SYSTEMIC THERAPY: No               SITE OF TREATMENT: [facility]     DATES  OF TREATMENT: - 9/15/23     TOTAL DOSE OF TREATMENT / FRACTIONS: 50Gy/5fx    INTERVAL SINCE COMPLETION OF RADIATION THERAPY: 3 month visit    SUBJECTIVE: Overall, she is doing well since completing radiation. Denies chest pain, hemoptysis, weight loss, neurologic deficit, dyspnea.     CT chest on 2023 demonstrates fairly stable disease, current measurement at 8.3 x 5.8 mm, previously 7.9 x 5.9mm in size.     PHYSICAL EXAM:  BP (!) 149/83 (BP Location: Left arm, Patient Position: Chair, Cuff Size: Adult Regular)   Pulse 98   Temp 98.7  F (37.1  C) (Oral)   Resp 18   Wt 49.9 kg (110 lb)   SpO2 97%   BMI 19.80 kg/m    Gen: Alert, in NAD  Eyes: PERRL, EOMI, sclera anicteric  HENT     Head: NC/AT     Ears: No external auricular lesions     Nose/sinus: No rhinorrhea or epistaxis     Oral Cavity/Oropharynx: MMM  Neck: Supple, full ROM, no LAD  Pulm: No wheezing, stridor or respiratory distress  CV: Well-perfused, no cyanosis, no pedal edema  Abdominal: Soft, nontender, nondistended, no hepatomegaly  Back: No step-offs or pain to  palpation along the thoracolumbar spine, no CVA tenderness  Rectal/: Deferred  Musculoskeletal: Normal bulk and tone   Skin: Normal color and turgor  Neurologic: A/Ox3, CN II-XII intact  Psychiatric: Appropriate mood and affect    LABS AND IMAGING:  Reviewed.    PET/CT 8/9/23 (prior to SBRT)        CT 12/12/23      IMPRESSION: Overall, patient is doing well from a radiation acute toxicity perspective.  CT demonstrates stable disease, without any local or regional progression.    PLAN:   CT Chest 3 months post RT    Peter Gillis M.D.  Department of Radiation Oncology  [facility]         Again, thank you for allowing me to participate in the care of your patient.        Sincerely,        Peter Gillis MD

## 2023-12-13 NOTE — NURSING NOTE
RADIATION ONCOLOGY LUNG FOLLOW-UP VISIT    Patient Name: Sienna Bennett      : 1950     Age: 73 year old        ______________________________________________________________________________       Chief Complaint   Patient presents with    Cancer     Radiation oncology return visit with Dr. Gillis     BP (!) 149/83 (BP Location: Left arm, Patient Position: Chair, Cuff Size: Adult Regular)   Pulse 98   Temp 98.7  F (37.1  C) (Oral)   Resp 18   Wt 49.9 kg (110 lb)   SpO2 97%   BMI 19.80 kg/m        History of Radiation Treatment:  Site: [facility] Lung - SBRT  Total Dose: 5,000 cGy  Date Completed: 9/15/2023  Clinic: [facility]  Physician: Dr. Peter Gillis    Pain:  Current history of pain associated with this visit:   Intensity: 7/10  Current: headache  Treatment: patient reports taking OTC pain medications as needed    Imaging:  CT Chest: 2023    Fatigue:   Grade 0: No toxicity    Skin:  No Concerns      Cough/Shortness of Breath:  No Concerns    Swallowing:  No Concerns    Use of Supplemental Oxygen:  No        Future Appointments:      Dr. Walker Appointment Date: 2023    Appointment Date:     Appointment Date:         Additional Instructions: return to clinic after 2024 for CT Chest and visit with Dr. Gillis when patient returns from winter traveling.    Nurse face-to-face time: Level 5:  over 15 min face to face time.    Li Crowe, RN BSN OCN CBCN

## 2023-12-13 NOTE — PATIENT INSTRUCTIONS
Please contact [facility] RN with questions or concerns following today's appointment: [phone].       Please feel free to leave a detailed message if your call is not answered.    If your call is not received before 3:00 PM, it may not be returned until the following business day.    If you are receiving radiation treatment and need assistance after 3:00 PM or on the weekends, please call [phone] and ask to speak to the radiation oncologist on-call.    Thank you!    Li JAIME

## 2023-12-15 ENCOUNTER — MYC MEDICAL ADVICE (OUTPATIENT)
Dept: CARDIOLOGY | Facility: CLINIC | Age: 73
End: 2023-12-15

## 2023-12-18 ENCOUNTER — THERAPY VISIT (OUTPATIENT)
Dept: PHYSICAL THERAPY | Facility: CLINIC | Age: 73
End: 2023-12-18
Payer: COMMERCIAL

## 2023-12-18 DIAGNOSIS — M81.0 OSTEOPOROSIS, UNSPECIFIED OSTEOPOROSIS TYPE, UNSPECIFIED PATHOLOGICAL FRACTURE PRESENCE: ICD-10-CM

## 2023-12-18 DIAGNOSIS — S32.040D COMPRESSION FRACTURE OF L4 VERTEBRA WITH ROUTINE HEALING: ICD-10-CM

## 2023-12-18 DIAGNOSIS — G89.29 CHRONIC BILATERAL LOW BACK PAIN WITHOUT SCIATICA: ICD-10-CM

## 2023-12-18 DIAGNOSIS — M54.50 CHRONIC BILATERAL LOW BACK PAIN WITHOUT SCIATICA: ICD-10-CM

## 2023-12-18 DIAGNOSIS — M54.2 NECK PAIN: Primary | ICD-10-CM

## 2023-12-18 PROCEDURE — 97110 THERAPEUTIC EXERCISES: CPT | Mod: GP

## 2023-12-18 NOTE — TELEPHONE ENCOUNTER
Discussed via MyChart with patient to review recent elevated blood pressure reading. Per [place] Community Measures guidelines, patients blood pressure is out of parameters and recheck blood pressure is recommended.    Last Blood Pressure: 159/81  Last Heart Rate: 105  Date: 6/8/23  Location: [facility] Cardiology    Most Recent Blood Pressure(s): 107/74  Today's Heart Rate: N/A   Location: Home BP    Patient reported blood pressure updated in Epic. Blood pressure falls within [place] Community Measures guidelines.  Patient will follow up as previously advised.

## 2023-12-18 NOTE — PROGRESS NOTES
Department of Radiation Oncology  [facility]    Health: Cancer Center  [facility]  [address] [place] [address] [phone]       Radiation Oncology Follow-up Visit  Dec 13, 2023      Sienna Bennett  MRN: [id number]   : 1950        DIAGNOSIS / ID: Ms. Bennett is a 72 year old female with poor lung function presenting with left upper lobe nodule, most consistent with a [id number] malignancy. Plan for SBRT to [facility].       INTENT OF RADIOTHERAPY: Definitive     CONCURRENT SYSTEMIC THERAPY: No               SITE OF TREATMENT: [facility]     DATES  OF TREATMENT: - 9/15/23     TOTAL DOSE OF TREATMENT / FRACTIONS: 50Gy/5fx    INTERVAL SINCE COMPLETION OF RADIATION THERAPY: 3 month visit    SUBJECTIVE: Overall, she is doing well since completing radiation. Denies chest pain, hemoptysis, weight loss, neurologic deficit, dyspnea.     CT chest on 2023 demonstrates fairly stable disease, current measurement at 8.3 x 5.8 mm, previously 7.9 x 5.9mm in size.     PHYSICAL EXAM:  BP (!) 149/83 (BP Location: Left arm, Patient Position: Chair, Cuff Size: Adult Regular)   Pulse 98   Temp 98.7  F (37.1  C) (Oral)   Resp 18   Wt 49.9 kg (110 lb)   SpO2 97%   BMI 19.80 kg/m    Gen: Alert, in NAD  Eyes: PERRL, EOMI, sclera anicteric  HENT     Head: NC/AT     Ears: No external auricular lesions     Nose/sinus: No rhinorrhea or epistaxis     Oral Cavity/Oropharynx: MMM  Neck: Supple, full ROM, no LAD  Pulm: No wheezing, stridor or respiratory distress  CV: Well-perfused, no cyanosis, no pedal edema  Abdominal: Soft, nontender, nondistended, no hepatomegaly  Back: No step-offs or pain to palpation along the thoracolumbar spine, no CVA tenderness  Rectal/: Deferred  Musculoskeletal: Normal bulk and tone   Skin: Normal color and turgor  Neurologic: A/Ox3, CN II-XII intact  Psychiatric: Appropriate mood and affect    LABS AND IMAGING:  Reviewed.    PET/CT  8/9/23 (prior to SBRT)        CT 12/12/23      IMPRESSION: Overall, patient is doing well from a radiation acute toxicity perspective.  CT demonstrates stable disease, without any local or regional progression.    PLAN:   CT Chest 3 months post RT    Peter Gillis M.D.  Department of Radiation Oncology  [facility]

## 2023-12-20 ENCOUNTER — OFFICE VISIT (OUTPATIENT)
Dept: PULMONOLOGY | Facility: CLINIC | Age: 73
End: 2023-12-20
Payer: COMMERCIAL

## 2023-12-20 ENCOUNTER — MYC MEDICAL ADVICE (OUTPATIENT)
Dept: CARDIOLOGY | Facility: CLINIC | Age: 73
End: 2023-12-20

## 2023-12-20 ENCOUNTER — TRANSFERRED RECORDS (OUTPATIENT)
Dept: HEALTH INFORMATION MANAGEMENT | Facility: CLINIC | Age: 73
End: 2023-12-20

## 2023-12-20 VITALS
DIASTOLIC BLOOD PRESSURE: 73 MMHG | SYSTOLIC BLOOD PRESSURE: 130 MMHG | WEIGHT: 110 LBS | HEART RATE: 100 BPM | OXYGEN SATURATION: 97 % | BODY MASS INDEX: 19.8 KG/M2

## 2023-12-20 DIAGNOSIS — J44.9 CHRONIC OBSTRUCTIVE PULMONARY DISEASE, UNSPECIFIED COPD TYPE (H): ICD-10-CM

## 2023-12-20 DIAGNOSIS — J43.2 CENTRILOBULAR EMPHYSEMA (H): ICD-10-CM

## 2023-12-20 PROCEDURE — 99215 OFFICE O/P EST HI 40 MIN: CPT | Performed by: INTERNAL MEDICINE

## 2023-12-20 RX ORDER — LEVALBUTEROL TARTRATE 45 UG/1
2 AEROSOL, METERED ORAL EVERY 4 HOURS PRN
Qty: 15 G | Refills: 1 | Status: SHIPPED | OUTPATIENT
Start: 2023-12-20

## 2023-12-20 RX ORDER — PREDNISONE 20 MG/1
20 TABLET ORAL DAILY
Qty: 5 TABLET | Refills: 0 | Status: SHIPPED | OUTPATIENT
Start: 2023-12-20 | End: 2023-12-25

## 2023-12-20 RX ORDER — AZITHROMYCIN 250 MG/1
TABLET, FILM COATED ORAL
Qty: 6 TABLET | Refills: 0 | Status: SHIPPED | OUTPATIENT
Start: 2023-12-20 | End: 2023-12-25

## 2023-12-20 NOTE — NURSING NOTE
Sienna Bennett's goals for this visit include:   Chief Complaint   Patient presents with    Follow Up    COPD       She requests these members of her care team be copied on today's visit information: yes     PCP: Kalli Valle    Referring Provider:  No referring provider defined for this encounter.    /73 (BP Location: Left arm, Patient Position: Chair, Cuff Size: Adult Regular)   Pulse 100   Wt 49.9 kg (110 lb)   SpO2 97%   BMI 19.80 kg/m      Do you need any medication refills at today's visit? Yes     DORINDA Lucas   Neph/Pulm [facility]

## 2023-12-20 NOTE — TELEPHONE ENCOUNTER
Date: 12/20/2023    Time of Call: 3:22 PM     Diagnosis:  CAD, HL, COPD     [ TORB ] Ordering provider: Dr. Crespo  Order: Okay for patient to take prescribed Prednisone if necessary     Order received by: Dee Garcia RN     Follow-up/additional notes: [employer] message sent to patient.    Dee Garcia RN, BSN  Cardiology RN Care Coordinator   [place]   Phone: [phone]  Fax: [phone] ([place]) [phone] ([place])

## 2023-12-20 NOTE — PROGRESS NOTES
Pulmonary Clinic Return Patient Visit  Reason for Visit: COPD  History of Present Illness  Sienna Bennett is a 73-year-old female who presents to clinic for follow up of COPD. I last saw her in 2/2023.  To briefly review, Kelsea was diagnosed with COPD over the last several years based on symptoms alone.  Her symptoms include daily coughing which is productive of tenacious, occasionally copious yellowish sputum, usually worse in the morning with associated dyspnea on exertion.  She also has underlying seasonal allergies with rhinitis and postnasal drip. She continued to be very symptomatic while on previously prescribed Incruse and I escalated her regimen to Trelegy with very good results.  She has done fairly well on her escalated regimen of Trelegy and there has had only one AECOPDs since the last clinic visit in 5/2023 for which she was treated with zpak and steroids. She appears to have better airway clearance since adding guaifenesin/Mucinex for better airway clearance.   Today, she is less SOB and has exertional dyspnea. Rescue inhalers have been helpful and she uses it seldomly. She was found to have a VARSHA spiculated nodule which was discussed at the cancer conference for which she was referred to Rad Onc for radiotherapy. She has since received 5 treatments and continues care with Dr. Chatterjee.  She denies any chest pains, no orthopnea, no PND and no pedal swellings.  No dysphonia and no dysphagia.  She denies any loud snoring nor daytime sleepiness. She is planning on going on a cruise next month.  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 Brother was diagnosed with lung cancer and he was a smoker. No exposure to asbestos, dust or toxic fumes.    Review of Systems:  10 of 14 systems reviewed and are negative unless otherwise stated in HPI.    Past Medical History:   Diagnosis Date    COPD (chronic obstructive pulmonary disease) (H) 2014     Essential thrombocythemia (H) 2022    Heart disease     Lung mass 2023    S/P radiation therapy     5,000 cGy to VARSHA Lung (SBRT) completed on 9/15/2023 [facility]       Past Surgical History:   Procedure Laterality Date    BIOPSY Left ?    breast bx from calcifications, benign    IR LUMBAR KYPHOPLASTY VERTEBRAE  10/11/2023    ORTHOPEDIC SURGERY  1967    Tendon repair Hand    THYROID SURGERY      left part of thyroid gland- age 22       Family History   Problem Relation Age of Onset    Hypertension Mother     Hyperlipidemia Mother     Cerebrovascular Disease Father         At age 86    Prostate Cancer Father     Hypertension Brother     Bladder Cancer Brother     Lung Cancer Brother     Lymphoma Brother        Social History     Socioeconomic History    Marital status:    Tobacco Use    Smoking status: Former Smoker     Packs/day: 1.00     Years: 46.00     Pack years: 46.00     Types: Cigarettes     Start date: 1968     Quit date: 2014     Years since quittin.5    Smokeless tobacco: Never Used    Tobacco comment: Quit in    Vaping Use    Vaping Use: Never used   Substance and Sexual Activity    Alcohol use: Yes     Comment: rare- maybe once a month    Drug use: No    Sexual activity: Not Currently     Birth control/protection: Post-menopausal   Other Topics Concern    Parent/sibling w/ CABG, MI or angioplasty before 65F 55M? No         Allergies   Allergen Reactions    Environmental [Adhesive Tape]     Wellbutrin [Bupropion]          Current Outpatient Medications:     aspirin 81 MG EC tablet, 81 mg daily, Disp: , Rfl:     benzonatate (TESSALON) 200 MG capsule, Take 1 capsule (200 mg) by mouth 3 times daily as needed for cough, Disp: 60 capsule, Rfl: 0    fluticasone (FLONASE) 50 MCG/ACT nasal spray, INSTILL 2 SPRAYS INTO BOTH NOSTRILS DAILY., Disp: 48 mL, Rfl: 2    Fluticasone-Umeclidin-Vilanterol (TRELEGY ELLIPTA) 200-62.5-25 MCG/ACT oral inhaler,  Inhale 1 puff into the lungs daily, Disp: 3 each, Rfl: 3    guaiFENesin (MUCINEX) 600 MG 12 hr tablet, Take 600-1,200 mg by mouth 2 times daily, Disp: , Rfl:     hydroxyurea (HYDREA) 500 MG capsule, Take 1 capsule (500 mg) by mouth daily 5 days/week with 1000 mg the other 2 days, Disp: 225 capsule, Rfl: 4    levalbuterol (XOPENEX HFA) 45 MCG/ACT inhaler, INHALE 2 PUFFS INTO THE LUNGS EVERY 4 HOURS AS NEEDED FOR SHORTNESS OF BREATH / DYSPNEA OR WHEEZING, Disp: 15 g, Rfl: 1    Probiotic Product (PROBIOTIC DAILY PO), Take by mouth daily, Disp: , Rfl:     rosuvastatin (CRESTOR) 20 MG tablet, Take 1 tablet (20 mg) by mouth daily, Disp: 90 tablet, Rfl: 3      Physical Exam:  There were no vitals taken for this visit.  GENERAL: Well developed, well nourished, alert, and in no apparent distress.  HEENT: Normocephalic, atraumatic. PERRL, EOMI. Oral mucosa is moist. No perioral cyanosis.  NECK: supple, no masses, no thyromegaly.  RESP:  Normal respiratory effort.  Markedly reduced breath sounds.  No rales, wheezes, rhonchi.  No cyanosis or clubbing.  CV: Normal S1, S2, regular rhythm, normal rate. No murmur.  No LE edema.   ABDOMEN:  Soft, non-tender, non-distended.   SKIN: warm and dry. No rash.  NEURO: AAOx3.  Normal gait.  Fluent speech.  PSYCH: mentation appears normal.   Results:  PFTs: Reviewed and discussed with patient-moderate obstruction with diffusion impairment.  Air trapping and hyperinflation  Most Recent [facility] Pulmonary Function Testing    FVC-Pred   Date Value Ref Range Status   02/15/2023 2.68 L      FVC-Pre   Date Value Ref Range Status   02/15/2023 2.31 L      FVC-%Pred-Pre   Date Value Ref Range Status   02/15/2023 86 %      FEV1-Pre   Date Value Ref Range Status   02/15/2023 0.92 L      FEV1-%Pred-Pre   Date Value Ref Range Status   02/15/2023 44 %      FEV1FVC-Pred   Date Value Ref Range Status   02/15/2023 78 %      FEV1FVC-Pre   Date Value Ref Range Status   02/15/2023 40 %      No results found for:  "\"20029\"  FEFMax-Pred   Date Value Ref Range Status   02/15/2023 5.38 L/sec      FEFMax-Pre   Date Value Ref Range Status   02/15/2023 3.32 L/sec      FEFMax-%Pred-Pre   Date Value Ref Range Status   02/15/2023 61 %      ExpTime-Pre   Date Value Ref Range Status   02/15/2023 10.15 sec      FIFMax-Pre   Date Value Ref Range Status   02/15/2023 2.97 L/sec      FEV1FEV6-Pred   Date Value Ref Range Status   02/15/2023 79 %      FEV1FEV6-Pre   Date Value Ref Range Status   02/15/2023 46 %      No results found for: \"20055\"  Imaging (personally reviewed in clinic today): CT Chest Lung 12/12/2023  Impression:   Left apical nodule measuring 8.3 mm, slightly increased since 7/5/2023 and 8/9/2023. Continued close follow-up 3 months.  Lung-RADS 4A.       Assessment and Plan:   COPD (Group D)   Significant obstruction on PFTs with emphysematous changes on radiologic imaging.  She does have a CAT score of 10 significantly improved from before. She has done fairly well on her escalated regimen of Trelegy and she appears to have better airway clearance since adding guaifenesin/Mucinex for better airway clearance and prescriptions were given with refills.  She is fairly active and I will hold off on referring her to pulmonary rehab at this time. Since she is going for a cruise next month, I will place a standing order of prednisone 20 mg po daily and Zpak for 5 days in any case she has an exacerbation while on the ship.  Left upper lobe nodule/presumed lung cancer s/p radiotherapy  Reviewed surveillance scans and continues to follow closely with [facility]- Dr. Chatterjee.      Questions and concerns were answered to the patient's satisfaction.  she was provided with my contact information should new questions or concerns arise in the interim.  she should return to clinic in 12 months  Up to date on vaccination    I spent 40 minutes on the date of the encounter doing chart review, history and exam, documentation and further coordination as " noted above exclusive of time interpreting PFT, Chest Xray, CT Chest.     Gilda Walker MD  Pulmonary, Critical Care and Sleep Medicine  [facility]  Pager: [phone]        The above note was dictated using voice recognition software and may include typographical errors. Please contact the author for any clarifications.

## 2023-12-22 ENCOUNTER — THERAPY VISIT (OUTPATIENT)
Dept: PHYSICAL THERAPY | Facility: CLINIC | Age: 73
End: 2023-12-22
Payer: COMMERCIAL

## 2023-12-22 DIAGNOSIS — M81.0 OSTEOPOROSIS, UNSPECIFIED OSTEOPOROSIS TYPE, UNSPECIFIED PATHOLOGICAL FRACTURE PRESENCE: ICD-10-CM

## 2023-12-22 DIAGNOSIS — M54.50 CHRONIC BILATERAL LOW BACK PAIN WITHOUT SCIATICA: ICD-10-CM

## 2023-12-22 DIAGNOSIS — S32.040D COMPRESSION FRACTURE OF L4 VERTEBRA WITH ROUTINE HEALING: ICD-10-CM

## 2023-12-22 DIAGNOSIS — M54.2 NECK PAIN: Primary | ICD-10-CM

## 2023-12-22 DIAGNOSIS — G89.29 CHRONIC BILATERAL LOW BACK PAIN WITHOUT SCIATICA: ICD-10-CM

## 2023-12-22 PROCEDURE — 97110 THERAPEUTIC EXERCISES: CPT | Mod: GP

## 2023-12-29 ENCOUNTER — THERAPY VISIT (OUTPATIENT)
Dept: PHYSICAL THERAPY | Facility: CLINIC | Age: 73
End: 2023-12-29
Payer: COMMERCIAL

## 2023-12-29 DIAGNOSIS — M81.0 OSTEOPOROSIS, UNSPECIFIED OSTEOPOROSIS TYPE, UNSPECIFIED PATHOLOGICAL FRACTURE PRESENCE: ICD-10-CM

## 2023-12-29 DIAGNOSIS — M54.2 NECK PAIN: Primary | ICD-10-CM

## 2023-12-29 DIAGNOSIS — M54.50 CHRONIC BILATERAL LOW BACK PAIN WITHOUT SCIATICA: ICD-10-CM

## 2023-12-29 DIAGNOSIS — S32.040D COMPRESSION FRACTURE OF L4 VERTEBRA WITH ROUTINE HEALING: ICD-10-CM

## 2023-12-29 DIAGNOSIS — G89.29 CHRONIC BILATERAL LOW BACK PAIN WITHOUT SCIATICA: ICD-10-CM

## 2023-12-29 PROCEDURE — 97110 THERAPEUTIC EXERCISES: CPT | Mod: GP

## 2023-12-29 NOTE — PROGRESS NOTES
12/29/23 0500   Appointment Info   Signing clinician's name / credentials Pasha Elias, PT, DPT, CSCS, CLT   Total/Authorized Visits [employer]   Visits Used 6   Medical Diagnosis Compression fracture of L4 vertebra, initial encounter (H).  Neck Pain   PT Tx Diagnosis Neck Pain, Low Back Pain, Osteoporosis, History of Compression Fracture   Progress Note/Certification   Onset of illness/injury or Date of Surgery 10/11/23   Therapy Frequency 1x a week   Predicted Duration 8 weeks   Progress Note Due Date 01/22/24   Progress Note Completed Date 11/27/23   GOALS   PT Goals 2   PT Goal 1   Goal Identifier Neck Pain   Goal Description Will be able to rotate her neck left and right to allow her to drive safely looking into her blind spots with 50 degrees of rotation bilaterally without pain worse than 5/10 as well as will be able to read and sew for 30 minutes without pain worse than 5/10   Rationale to maximize safety and independence with performance of ADLs and functional tasks;to maximize safety and independence within the home;to maximize safety and independence within the community;to maximize safety and independence with transportation;to maximize safety and independence with self cares   Goal Progress Can drive and see her blind spots safely without pain.  Most pain she will feel is 6/10 looking 100% behind her without thoracic rotation. She can read and sew for 30 minutes with pain only to 3/10 that calms immediately with a break   Target Date 01/22/24   Date Met 12/29/23   PT Goal 2   Goal Identifier Low Back Pain   Goal Description Patient will have a home exercise program that she is independent with that she can do to offer weight bearing and weighted strengthening to support her bone density and osteoporosis as well as calm her low back pain to less than 4/10 independently   Rationale to maximize safety and independence with performance of ADLs and functional tasks;to maximize safety and independence within the  home;to maximize safety and independence within the community;to maximize safety and independence with self cares;to maximize safety and independence with transportation   Goal Progress She is safe and independent with her exercises. She doesn't have the back pain she previously had.   Target Date 01/22/24   Date Met 12/29/23   Subjective Report   Subjective Report She reports things are feeling about the same   Objective Measures   Objective Measures Objective Measure 1;Objective Measure 2;Objective Measure 3;Objective Measure 5;Objective Measure 4   Objective Measure 1   Objective Measure Pain on Arrival   Details Low Back: 0/10.  Neck: 2/10   Objective Measure 2   Objective Measure Cervical ROM   Details Flexion: Full and stretch at end range, Extension: 52 degrees with stretch at end range, Left Rotation: 58 degrees, Right Rotation: 58 degrees, Left Side Bend: 28 with hard end feel and painful, Right Side bend: 35 degrees and easy/painfree   Objective Measure 3   Objective Measure Lumbar ROM   Details Flexion: Finger tips to the tops of the feet.  Extension: Deferred due to vertigo and dizziness.  Left Side Bend: Finger Tips to Fibular Head.  Right Side Bend: Finger Tips to Fibular.   Objective Measure 4   Objective Measure HYACINTH / Jos / NDI   Details HYACINTH: 6 (13.33%) / Jos: 1 (LOW)  / NDI: 10 (20%)   Treatment Interventions (PT)   Interventions Therapeutic Procedure/Exercise   Therapeutic Procedure/Exercise   Therapeutic Procedures Ther Proc 2;Ther Proc 3;Ther Proc 4   Ther Proc 1 Bike   Ther Proc 1 - Details x8 minutes for warm up   Ther Proc 2 Discharge Procedure   Ther Proc 2 - Details Objective Measures, Tests and Goals   PTRx Ther Proc 1 Cervical Retraction   PTRx Ther Proc 1 - Details 1x10   PTRx Ther Proc 2 Cervical Retraction With Patient Overpressure   PTRx Ther Proc 2 - Details 1x10   PTRx Ther Proc 3 Scapular Retraction/Depression   PTRx Ther Proc 3 - Details 1x10   PTRx Ther Proc 4 Shoulder  Theraband Rows   PTRx Ther Proc 4 - Details 1x20 with Red Theraband   Skilled Intervention Alternative exercises than HEP   Patient Response/Progress Fatigued easily with tired thighs. But otherwise no complaints   Plan   Home program See PTRx   Updates to plan of care Continue per POC   Plan for next session Discharged           DISCHARGE  Reason for Discharge: Patient has met all goals.    Equipment Issued: None    Discharge Plan: Patient to continue home program.    Referring Provider:  Solitario Ortiz MD

## 2024-02-29 ENCOUNTER — TELEPHONE (OUTPATIENT)
Dept: FAMILY MEDICINE | Facility: CLINIC | Age: 74
End: 2024-02-29
Payer: COMMERCIAL

## 2024-02-29 NOTE — TELEPHONE ENCOUNTER
Patient Quality Outreach    Patient is due for the following:   Depression  -  PHQ-9 needed  Physical Annual Wellness Visit      Topic Date Due    Zoster (Shingles) Vaccine (1 of 2) Never done    Diptheria Tetanus Pertussis (DTAP/TDAP/TD) Vaccine (1 - Tdap) Never done       Next Steps:   Schedule provider appointment     Type of outreach:    Sent [employer] message.      Questions for provider review:    None           Rubi Vega MA

## 2024-03-06 DIAGNOSIS — D47.3 ESSENTIAL THROMBOCYTHEMIA (H): ICD-10-CM

## 2024-03-06 RX ORDER — HYDROXYUREA 500 MG/1
500 CAPSULE ORAL DAILY
Qty: 225 CAPSULE | Refills: 4 | Status: SHIPPED | OUTPATIENT
Start: 2024-03-06

## 2024-03-06 NOTE — TELEPHONE ENCOUNTER
Medication requested: hydroxyurea 500 mg capsule    Last prescribing provider: Dr. Sanjay Lambert on 1/19/23    Last clinic visit date: 7/20/23 with Dr. Lambert    Recommendations for requested medication (if none, N/A): NA    Any other pertinent information (if none, N/A): NA    Refilled: Y/N, if NO, why?    Pended and Routed to Dr. Sanjay Lambert

## 2024-03-23 DIAGNOSIS — I25.10 CORONARY ARTERY DISEASE INVOLVING NATIVE CORONARY ARTERY OF NATIVE HEART WITHOUT ANGINA PECTORIS: ICD-10-CM

## 2024-03-23 DIAGNOSIS — E78.5 HYPERLIPIDEMIA, ACQUIRED: ICD-10-CM

## 2024-03-28 RX ORDER — ROSUVASTATIN CALCIUM 20 MG/1
20 TABLET, COATED ORAL DAILY
Qty: 90 TABLET | Refills: 1 | Status: SHIPPED | OUTPATIENT
Start: 2024-03-28

## 2024-03-28 NOTE — TELEPHONE ENCOUNTER
rosuvastatin (CRESTOR) 20 MG tablet 90 tablet 3 6/25/2023     Last Office Visit: 6/8/23  Future Office visit:  8/22/24    Antihyperlipidemic agents Passed     Sarahy Granados RN  P Red Flag Triage/MRT

## 2024-04-22 ENCOUNTER — TELEPHONE (OUTPATIENT)
Dept: CARDIOLOGY | Facility: CLINIC | Age: 74
End: 2024-04-22
Payer: COMMERCIAL

## 2024-04-22 NOTE — TELEPHONE ENCOUNTER
Sent [employer] (1st Attempt) for the patient to call back and schedule the following:    Appointment type: Return Card  Provider: Dr. Crespo  Return date: due May 2024  Specialty phone number: [phone]  Additional appointment(s) needed:   Additonal Notes:     Attempted to reach the patient and reschedule the appointment on 8/22/2024 with Dr Petr Crespo, provider attending/precepting. Unable to leave a voicemail, sent a [employer] message and appointment reschedule letter.    Prema URBINA/Complex Procedure    [facility]   Neurology, NeuroSurgery, NeuroPsychology, Pain Management and Cardiology Specialties  Medical/Surgical Adult Specialties

## 2024-04-26 NOTE — TELEPHONE ENCOUNTER
Left Voicemail (2nd Attempt) and Sent Mychart (2nd Attempt) for the patient to call back and schedule the following:    Appointment type: Return Card  Provider: Dr. Crespo  Return date: next available  Specialty phone number: [phone]  Additional appointment(s) needed:   Additonal Notes:     2nd attempt to reschedule. Lvm, sent  msg, and appointment reschedule letter.    Prema URBINA/Complex Procedure    [facility]   Neurology, NeuroSurgery, NeuroPsychology, Pain Management and Cardiology Specialties  Medical/Surgical Adult Specialties

## 2024-05-03 DIAGNOSIS — R06.09 DYSPNEA ON EXERTION: ICD-10-CM

## 2024-05-03 DIAGNOSIS — I25.10 CORONARY ARTERY DISEASE INVOLVING NATIVE CORONARY ARTERY OF NATIVE HEART WITHOUT ANGINA PECTORIS: Primary | ICD-10-CM

## 2024-05-03 DIAGNOSIS — E78.5 HYPERLIPIDEMIA, ACQUIRED: ICD-10-CM

## 2024-05-08 ENCOUNTER — ANCILLARY PROCEDURE (OUTPATIENT)
Dept: MAMMOGRAPHY | Facility: CLINIC | Age: 74
End: 2024-05-08
Attending: FAMILY MEDICINE
Payer: COMMERCIAL

## 2024-05-08 DIAGNOSIS — Z12.31 VISIT FOR SCREENING MAMMOGRAM: ICD-10-CM

## 2024-05-08 PROCEDURE — 77067 SCR MAMMO BI INCL CAD: CPT | Mod: GC

## 2024-05-08 PROCEDURE — 77063 BREAST TOMOSYNTHESIS BI: CPT | Mod: GC

## 2024-05-10 ENCOUNTER — ANCILLARY PROCEDURE (OUTPATIENT)
Dept: CT IMAGING | Facility: CLINIC | Age: 74
End: 2024-05-10
Attending: SURGERY
Payer: COMMERCIAL

## 2024-05-10 DIAGNOSIS — C34.12 PRIMARY MALIGNANT NEOPLASM OF LEFT UPPER LOBE OF LUNG (H): ICD-10-CM

## 2024-05-10 PROCEDURE — 71250 CT THORAX DX C-: CPT | Mod: GC | Performed by: RADIOLOGY

## 2024-05-15 ENCOUNTER — OFFICE VISIT (OUTPATIENT)
Dept: RADIATION ONCOLOGY | Facility: CLINIC | Age: 74
End: 2024-05-15
Payer: COMMERCIAL

## 2024-05-15 VITALS
BODY MASS INDEX: 20.84 KG/M2 | DIASTOLIC BLOOD PRESSURE: 69 MMHG | RESPIRATION RATE: 18 BRPM | HEART RATE: 96 BPM | OXYGEN SATURATION: 93 % | WEIGHT: 115.8 LBS | SYSTOLIC BLOOD PRESSURE: 123 MMHG | TEMPERATURE: 97.9 F

## 2024-05-15 DIAGNOSIS — C34.12 PRIMARY MALIGNANT NEOPLASM OF LEFT UPPER LOBE OF LUNG (H): Primary | ICD-10-CM

## 2024-05-15 PROCEDURE — 99207 PR NO CHARGE LOS: CPT | Performed by: SURGERY

## 2024-05-15 PROCEDURE — 99215 OFFICE O/P EST HI 40 MIN: CPT | Performed by: SURGERY

## 2024-05-15 PROCEDURE — G2211 COMPLEX E/M VISIT ADD ON: HCPCS | Performed by: SURGERY

## 2024-05-15 RX ORDER — TERIPARATIDE 250 UG/ML
INJECTION, SOLUTION SUBCUTANEOUS
COMMUNITY
Start: 2024-05-06

## 2024-05-15 RX ORDER — AZITHROMYCIN 250 MG/1
TABLET, FILM COATED ORAL
Qty: 6 TABLET | Refills: 0 | Status: SHIPPED | OUTPATIENT
Start: 2024-05-15 | End: 2024-05-20

## 2024-05-15 ASSESSMENT — PAIN SCALES - GENERAL: PAINLEVEL: NO PAIN (0)

## 2024-05-15 NOTE — PATIENT INSTRUCTIONS
Please contact [facility] RN with questions or concerns following today's appointment: [phone].       Please feel free to leave a detailed message if your call is not answered.    If your call is not received before 3:00 PM, it may not be returned until the following business day.    If you are receiving radiation treatment and need assistance after 3:00 PM or on the weekends, please call [phone] and ask to speak to the radiation oncologist on-call.    Thank you!    Li JAIME

## 2024-05-15 NOTE — PROGRESS NOTES
Department of Radiation Oncology  [facility]    Health: Cancer Center  [facility]  [address] [place] [address] [id number] [phone]       Radiation Oncology Follow-up Visit  May 15, 2024      Sienna Bennett  MRN: [id number]   : 1950        DIAGNOSIS / ID: Ms. Bennett is a 72 year old female with poor lung function presenting with left upper lobe nodule, most consistent with a [id number] malignancy. Plan for SBRT to VARSHA.       INTENT OF RADIOTHERAPY: Definitive     CONCURRENT SYSTEMIC THERAPY: No               SITE OF TREATMENT: VARSHA     DATES  OF TREATMENT: - 9/15/23     TOTAL DOSE OF TREATMENT / FRACTIONS: 50Gy/5fx    INTERVAL SINCE COMPLETION OF RADIATION THERAPY: 6-9 month visit    SUBJECTIVE:     CT chest on 2023 demonstrates fairly stable disease, current measurement at 8.3 x 5.8 mm, previously 7.9 x 5.9mm in size.     CT Chest 5/10/24 demonstrates slight decrease/stable left apex nodule, measuring 7.2 mm in size, with associated post treatment related changes.  However, imaging also demonstrated a spiculated nodular opacity in the right upper lobe measuring up to 1.5 cm in size, which is new.  This may represent infection versus an additional primary, upon my review.  It is unlikely to be radiation pneumonitis, given that this is outside the radiation field    Today, patient endorses that she recently has come back from a trip.  Approximately 2 to 9 months ago, she developed a worsening cough and URI symptoms, which improved with antibiotic use with a Z-Art.  However this is now returned and worsened, and patient is now having worsening of cough, with some mild congestive symptoms and worsening URI symptoms.  She denies any hemoptysis, chest pain.    PHYSICAL EXAM:  /69 (BP Location: Left arm, Patient Position: Chair, Cuff Size: Adult Regular)   Pulse 96   Temp 97.9  F (36.6  C) (Oral)   Resp 18   Wt 52.5 kg (115 lb 12.8  oz)   SpO2 93%   BMI 20.84 kg/m    Gen: Alert, in NAD  Eyes: PERRL, EOMI, sclera anicteric  HENT     Head: NC/AT     Ears: No external auricular lesions     Nose/sinus: No rhinorrhea or epistaxis     Oral Cavity/Oropharynx: MMM  Neck: Supple, full ROM, no LAD  Pulm: No wheezing, stridor or respiratory distress  CV: Well-perfused, no cyanosis, no pedal edema  Abdominal: Soft, nontender, nondistended, no hepatomegaly  Back: No step-offs or pain to palpation along the thoracolumbar spine, no CVA tenderness  Rectal/: Deferred  Musculoskeletal: Normal bulk and tone   Skin: Normal color and turgor  Neurologic: A/Ox3, CN II-XII intact  Psychiatric: Appropriate mood and affect    LABS AND IMAGING:    Per HPI, reviewed and in agreement     PET/CT 8/9/23 (prior to SBRT)        CT 12/12/23       CT 5/10/24                IMPRESSION: Overall, patient is doing well from a radiation acute toxicity perspective.  CT demonstrates stable disease, without any local or regional progression.  However, recent imaging demonstrated a new spiculated nodule on the contralateral lung, outside the radiation field, patient is also endorsing a new onset of URI symptoms, which she has had approximately 2 to 3 months ago after returning from a trip.    PLAN:   I will prescribe a course of antibiotics, and discussed with  and pulmonology  CT scan of the chest with and without contrast in 6 weeks (short interval).  If she does not have resolution of this contralateral lung lesion, I will obtain a PET CT scan at that point.    Peter Gillis M.D.  Department of Radiation Oncology  [facility]     A total of 40 minutes were spent on this visit, including preparation for this visit, face to face with patient, and medical decision making. Medical decision-making included consideration and possible diagnosis, management options, complex record review, review of diagnostic tests, consideration and discussion of significant  complications based up medical history/comorbidities, and discussion with providers involved in care of the patient.

## 2024-05-15 NOTE — NURSING NOTE
"Oncology Rooming Note    May 15, 2024 1:51 PM   Sienna Bennett is a 73 year old female who presents for:    Chief Complaint   Patient presents with    Cancer     Radiation oncology return visit with Dr. Gillis     Initial Vitals: /69 (BP Location: Left arm, Patient Position: Chair, Cuff Size: Adult Regular)   Pulse 96   Temp 97.9  F (36.6  C) (Oral)   Resp 18   Wt 52.5 kg (115 lb 12.8 oz)   SpO2 93%   BMI 20.84 kg/m   Estimated body mass index is 20.84 kg/m  as calculated from the following:    Height as of 11/14/23: 1.588 m (5' 2.5\").    Weight as of this encounter: 52.5 kg (115 lb 12.8 oz). Body surface area is 1.52 meters squared.  No Pain (0) Comment: Data Unavailable   No LMP recorded. Patient is postmenopausal.  Allergies reviewed: Yes  Medications reviewed: Yes    Medications: Medication refills not needed today.  Pharmacy name entered into [facility]: [facility] [id number] [place] [address]    Frailty Screening:   Is the patient here for a new oncology consult visit in cancer care? 2. No      Clinical concerns: patient presents to clinic for CT Chest review and return visit with Dr. Gillis.  Patient reports worsening sinus and chest congestion over the past one month, patient recently returned from four month world cruise.    Previous Radiation:  Site: VARSHA Lung - SBRT  Dose: 5,000 cGy  End Date: 9/15/2023  Physician: Dr. Peter Gillis was notified.      Li Crowe, RN BSN OCN CBCN                "

## 2024-05-15 NOTE — LETTER
5/15/2024         RE: Sienna Bennett  [address]  [place] [address]        Dear Colleague,    Thank you for referring your patient, Sienna Bennett, to the [facility] RADIATION ONCOLOGY [place]. Please see a copy of my visit note below.         Department of Radiation Oncology  [facility]    Health: Cancer Center  [facility]  [address] [place] [id number] [phone]       Radiation Oncology Follow-up Visit  May 15, 2024      Sienna Bennett  MRN: [id number]   : 1950        DIAGNOSIS / ID: Ms. Bennett is a 72 year old female with poor lung function presenting with left upper lobe nodule, most consistent with a [id number] malignancy. Plan for SBRT to [facility].       INTENT OF RADIOTHERAPY: Definitive     CONCURRENT SYSTEMIC THERAPY: No               SITE OF TREATMENT: [facility]     DATES  OF TREATMENT: - 9/15/23     TOTAL DOSE OF TREATMENT / FRACTIONS: 50Gy/5fx    INTERVAL SINCE COMPLETION OF RADIATION THERAPY: 6-9 month visit    SUBJECTIVE:     CT chest on 2023 demonstrates fairly stable disease, current measurement at 8.3 x 5.8 mm, previously 7.9 x 5.9mm in size.     CT Chest 5/10/24 demonstrates slight decrease/stable left apex nodule, measuring 7.2 mm in size, with associated post treatment related changes.  However, imaging also demonstrated a spiculated nodular opacity in the right upper lobe measuring up to 1.5 cm in size, which is new.  This may represent infection versus an additional primary, upon my review.  It is unlikely to be radiation pneumonitis, given that this is outside the radiation field    Today, patient endorses that she recently has come back from a trip.  Approximately 2 to 9 months ago, she developed a worsening cough and URI symptoms, which improved with antibiotic use with a Z-Art.  However this is now returned and worsened, and patient is now having worsening of cough, with some mild  congestive symptoms and worsening URI symptoms.  She denies any hemoptysis, chest pain.    PHYSICAL EXAM:  /69 (BP Location: Left arm, Patient Position: Chair, Cuff Size: Adult Regular)   Pulse 96   Temp 97.9  F (36.6  C) (Oral)   Resp 18   Wt 52.5 kg (115 lb 12.8 oz)   SpO2 93%   BMI 20.84 kg/m    Gen: Alert, in NAD  Eyes: PERRL, EOMI, sclera anicteric  HENT     Head: NC/AT     Ears: No external auricular lesions     Nose/sinus: No rhinorrhea or epistaxis     Oral Cavity/Oropharynx: MMM  Neck: Supple, full ROM, no LAD  Pulm: No wheezing, stridor or respiratory distress  CV: Well-perfused, no cyanosis, no pedal edema  Abdominal: Soft, nontender, nondistended, no hepatomegaly  Back: No step-offs or pain to palpation along the thoracolumbar spine, no CVA tenderness  Rectal/: Deferred  Musculoskeletal: Normal bulk and tone   Skin: Normal color and turgor  Neurologic: A/Ox3, CN II-XII intact  Psychiatric: Appropriate mood and affect    LABS AND IMAGING:    Per HPI, reviewed and in agreement     PET/CT 8/9/23 (prior to SBRT)        CT 12/12/23       CT 5/10/24                IMPRESSION: Overall, patient is doing well from a radiation acute toxicity perspective.  CT demonstrates stable disease, without any local or regional progression.  However, recent imaging demonstrated a new spiculated nodule on the contralateral lung, outside the radiation field, patient is also endorsing a new onset of URI symptoms, which she has had approximately 2 to 3 months ago after returning from a trip.    PLAN:   I will prescribe a course of antibiotics, and discussed with  and pulmonology  CT scan of the chest with and without contrast in 6 weeks (short interval).  If she does not have resolution of this contralateral lung lesion, I will obtain a PET CT scan at that point.    Peter Gillis M.D.  Department of Radiation Oncology  [facility]     A total of 40 minutes were spent on this visit, including  preparation for this visit, face to face with patient, and medical decision making. Medical decision-making included consideration and possible diagnosis, management options, complex record review, review of diagnostic tests, consideration and discussion of significant complications based up medical history/comorbidities, and discussion with providers involved in care of the patient.       Again, thank you for allowing me to participate in the care of your patient.        Sincerely,        Peter Gillis MD

## 2024-05-17 NOTE — PROGRESS NOTES
[facility] Cardiology   Cardiology Clinic Note      HPI:   Ms. Sienna Bennett is a pleasant 73 year old female with medical history pertinent for COPD, essential thrombocytosis, HLD, and coronary artery calcifications on CT. She presents to cardiology clinic for routine follow up.    Patient was last seen in cardiology by Dr. Crespo on 6/8/24. At that time, she was feeling well and was reporting no new symptoms. She was originally seen by her PCP, Dr. Valle, on 3/2022 (note reviewed); CT chest showed coronary artery calcification prompting referral. She was also scheduled for pulmonology referral and PFTs and started on a bronchodilator. She has chronic TALLEY that is worse with heat, humidity, and higher workloads. Per chart review, patient also with a history of palpitations that occur at night and resolved with deep breathing.     Of note, patient with poor lung function presenting with left upper lobe nodule, most consistent with a [id number] malignancy for which she follows with radiation oncology.     Since her last visit, patient reports feeling well other than her pulmonary issues. States she's been fighting a URI since February. She reports going on a 4 month cruise and feels this may be related. She plans to follow up with pulmonology.    She reports her blood pressures usually runs around 120s-130s/70s. She attributes higher numbers in office today to not feeling well from URI.    Today in clinic, she denies chest pain, palpitations, dizziness, syncope, or lower extremity edema.     PAST MEDICAL HISTORY:  Past Medical History:   Diagnosis Date    COPD (chronic obstructive pulmonary disease) (H) 2014    Essential thrombocythemia (H) 07/20/2022    Heart disease 2022    Lung mass 04/03/2023    S/P radiation therapy     5,000 cGy to VAR[facility] Lung (SBRT) completed on 9/15/2023 - [facility]       FAMILY HISTORY:  Family History   Problem Relation Age of Onset    Hypertension Mother      Hyperlipidemia Mother     Cerebrovascular Disease Father         At age 86    Prostate Cancer Father     Hypertension Brother     Bladder Cancer Brother     Lung Cancer Brother     Lymphoma Brother        SOCIAL HISTORY:  Social History     Socioeconomic History    Marital status:    Tobacco Use    Smoking status: Former     Current packs/day: 0.00     Average packs/day: 1.5 packs/day for 45.0 years (67.5 ttl pk-yrs)     Types: Cigarettes     Start date: 1968     Quit date: 2013     Years since quittin.3    Smokeless tobacco: Never   Vaping Use    Vaping status: Never Used   Substance and Sexual Activity    Alcohol use: Yes     Comment: rare social use - maybe once a month    Drug use: No    Sexual activity: Not Currently     Birth control/protection: Post-menopausal   Other Topics Concern    Parent/sibling w/ CABG, MI or angioplasty before 65F 55M? No     Social Determinants of Health     Financial Resource Strain: Low Risk  (2023)    Financial Resource Strain     Within the past 12 months, have you or your family members you live with been unable to get utilities (heat, electricity) when it was really needed?: No   Food Insecurity: Low Risk  (2023)    Food Insecurity     Within the past 12 months, did you worry that your food would run out before you got money to buy more?: No     Within the past 12 months, did the food you bought just not last and you didn t have money to get more?: No   Transportation Needs: Low Risk  (2023)    Transportation Needs     Within the past 12 months, has lack of transportation kept you from medical appointments, getting your medicines, non-medical meetings or appointments, work, or from getting things that you need?: No   Housing Stability: Low Risk  (2023)    Housing Stability     Do you have housing? : Yes     Are you worried about losing your housing?: No       CURRENT MEDICATIONS:  Current Outpatient Medications   Medication Sig Dispense  "Refill    aspirin 81 MG EC tablet 81 mg daily      calcium carbonate-vitamin D (CALTRATE) 600-10 MG-MCG per tablet Take 1 tablet by mouth 2 times daily      fluticasone (FLONASE) 50 MCG/ACT nasal spray INSTILL 2 SPRAYS INTO BOTH NOSTRILS DAILY. 48 mL 2    Fluticasone-Umeclidin-Vilanterol (TRELEGY ELLIPTA) 200-62.5-25 MCG/ACT oral inhaler Inhale 1 puff into the lungs daily 3 each 3    guaiFENesin (MUCINEX) 600 MG 12 hr tablet Take 600-1,200 mg by mouth 2 times daily      hydroxyurea (HYDREA) 500 MG capsule Take 1 capsule (500 mg) by mouth daily 5 days/week with 1000 mg the other 2 days 225 capsule 4    levalbuterol (XOPENEX HFA) 45 MCG/ACT inhaler Inhale 2 puffs into the lungs every 4 hours as needed for shortness of breath or wheezing 15 g 1    Probiotic Product (PROBIOTIC DAILY PO) Take by mouth daily      rosuvastatin (CRESTOR) 20 MG tablet Take 1 tablet (20 mg) by mouth daily 90 tablet 1    teriparatide, recombinant, (FORTEO) 600 MCG/2.4ML SOPN injection  (Patient not taking: Reported on 5/15/2024)      vitamin D2 (ERGOCALCIFEROL) 71558 units (1250 mcg) capsule Take 50,000 Units by mouth once a week (Patient not taking: Reported on 5/21/2024)       No current facility-administered medications for this visit.       ROS:   Refer to HPI    EXAM:  BP (!) 123/91 (BP Location: Right arm, Patient Position: Sitting, Cuff Size: Adult Regular)   Pulse 104   Ht 1.588 m (5' 2.5\")   Wt 53.4 kg (117 lb 11.2 oz)   SpO2 96%   BMI 21.18 kg/m      GENERAL: Appears comfortable, in no acute distress.   HEENT: Eye symmetrical, no discharge or icterus bilaterally. Mucous membranes moist and without lesions.  CV: RRR, +S1S2, no murmur, rub, or gallop.   RESPIRATORY: Respirations regular, even, and unlabored. Lungs CTA throughout.   GI: Soft and non distended  EXTREMITIES: no peripheral edema.  NEUROLOGIC: Alert and oriented x 3. No focal deficits.   MUSCULOSKELETAL: No joint swelling or tenderness.   SKIN: No jaundice. No rashes " "or lesions.     Labs, reviewed with patient in clinic today:  CBC RESULTS:  Lab Results   Component Value Date    WBC 4.3 10/11/2023    RBC 3.40 (L) 10/11/2023    HGB 13.5 10/11/2023    HCT 39.2 10/11/2023     (H) 10/11/2023    MCH 39.7 (H) 10/11/2023    MCHC 34.4 10/11/2023    RDW 13.5 10/11/2023     10/11/2023       CMP RESULTS:  Lab Results   Component Value Date     05/21/2024    POTASSIUM 4.2 05/21/2024    POTASSIUM 4.8 04/19/2022    CHLORIDE 104 05/21/2024    CHLORIDE 104 04/19/2022    CO2 27 05/21/2024    CO2 28 04/19/2022    ANIONGAP 9 05/21/2024    ANIONGAP 7 04/19/2022     (H) 05/21/2024     (H) 01/12/2023    BUN 15.9 05/21/2024    BUN 22 04/19/2022    CR 0.84 05/21/2024    GFRESTIMATED 74 05/21/2024    GFRESTIMATED 40 (L) 08/09/2023    KAYLAN 9.0 05/21/2024    BILITOTAL 0.5 04/19/2022    ALBUMIN 4.1 04/19/2022    ALKPHOS 123 04/19/2022    ALT 36 04/19/2022    AST 21 04/19/2022        INR RESULTS:  Lab Results   Component Value Date    INR 0.97 10/11/2023       No results found for: \"MAG\"  No results found for: \"NTBNPI\"  No results found for: \"NTBNP\"    LIPIDS:  Lab Results   Component Value Date    CHOL 174 07/13/2023     Lab Results   Component Value Date    HDL 65 07/13/2023     Lab Results   Component Value Date    LDL 87 07/13/2023     Lab Results   Component Value Date    TRIG 108 07/13/2023       Assessment and Plan:   Ms. Bennett is a 73 year old female with a PMH of COPD, essential thrombocytosis, HLD, and coronary artery calcifications on CT    # Coronary artery calcifications on CT  # HLD   Patient reports feeling well without any issues or complaints. She reports her only complaints are pulmonary-related for which she follows with a pulmonologist   Most recent lipid panel 5/21/24: , HDL 53, LDL 74,   - Continue ASA 81mg daily + Crestor 20mg daily       Follow up:  1 year with Dr. Crespo   Chart review time today: 10 minutes  Visit time today: 20 " minutes  Total time spent today: 30 minutes      Marleen Glez PA-C  General Cardiology   05/21/24

## 2024-05-20 ENCOUNTER — MYC MEDICAL ADVICE (OUTPATIENT)
Dept: FAMILY MEDICINE | Facility: CLINIC | Age: 74
End: 2024-05-20
Payer: COMMERCIAL

## 2024-05-21 ENCOUNTER — TRANSFERRED RECORDS (OUTPATIENT)
Dept: HEALTH INFORMATION MANAGEMENT | Facility: CLINIC | Age: 74
End: 2024-05-21

## 2024-05-21 ENCOUNTER — LAB (OUTPATIENT)
Dept: LAB | Facility: CLINIC | Age: 74
End: 2024-05-21
Payer: COMMERCIAL

## 2024-05-21 ENCOUNTER — OFFICE VISIT (OUTPATIENT)
Dept: CARDIOLOGY | Facility: CLINIC | Age: 74
End: 2024-05-21
Payer: COMMERCIAL

## 2024-05-21 VITALS
HEART RATE: 104 BPM | BODY MASS INDEX: 20.86 KG/M2 | OXYGEN SATURATION: 96 % | WEIGHT: 117.7 LBS | SYSTOLIC BLOOD PRESSURE: 123 MMHG | HEIGHT: 63 IN | DIASTOLIC BLOOD PRESSURE: 91 MMHG

## 2024-05-21 DIAGNOSIS — R06.09 DYSPNEA ON EXERTION: ICD-10-CM

## 2024-05-21 DIAGNOSIS — E78.5 HYPERLIPIDEMIA, ACQUIRED: ICD-10-CM

## 2024-05-21 DIAGNOSIS — I25.10 CORONARY ARTERY DISEASE INVOLVING NATIVE CORONARY ARTERY OF NATIVE HEART WITHOUT ANGINA PECTORIS: ICD-10-CM

## 2024-05-21 DIAGNOSIS — E78.5 HYPERLIPIDEMIA LDL GOAL <100: Primary | ICD-10-CM

## 2024-05-21 LAB
ANION GAP SERPL CALCULATED.3IONS-SCNC: 9 MMOL/L (ref 7–15)
BUN SERPL-MCNC: 15.9 MG/DL (ref 8–23)
CALCIUM SERPL-MCNC: 9 MG/DL (ref 8.8–10.2)
CHLORIDE SERPL-SCNC: 104 MMOL/L (ref 98–107)
CHOLEST SERPL-MCNC: 148 MG/DL
CREAT SERPL-MCNC: 0.84 MG/DL (ref 0.51–0.95)
DEPRECATED HCO3 PLAS-SCNC: 27 MMOL/L (ref 22–29)
EGFRCR SERPLBLD CKD-EPI 2021: 74 ML/MIN/1.73M2
FASTING STATUS PATIENT QL REPORTED: YES
FASTING STATUS PATIENT QL REPORTED: YES
GLUCOSE SERPL-MCNC: 103 MG/DL (ref 70–99)
HDLC SERPL-MCNC: 53 MG/DL
LDLC SERPL CALC-MCNC: 74 MG/DL
NONHDLC SERPL-MCNC: 95 MG/DL
POTASSIUM SERPL-SCNC: 4.2 MMOL/L (ref 3.4–5.3)
SODIUM SERPL-SCNC: 140 MMOL/L (ref 135–145)
TRIGL SERPL-MCNC: 103 MG/DL

## 2024-05-21 PROCEDURE — 80061 LIPID PANEL: CPT

## 2024-05-21 PROCEDURE — 80048 BASIC METABOLIC PNL TOTAL CA: CPT

## 2024-05-21 PROCEDURE — 36415 COLL VENOUS BLD VENIPUNCTURE: CPT

## 2024-05-21 PROCEDURE — 99214 OFFICE O/P EST MOD 30 MIN: CPT

## 2024-05-21 RX ORDER — CALCIUM CARBONATE/VITAMIN D3 600 MG-10
1 TABLET ORAL 2 TIMES DAILY
COMMUNITY

## 2024-05-21 RX ORDER — ERGOCALCIFEROL 1.25 MG/1
50000 CAPSULE, LIQUID FILLED ORAL WEEKLY
COMMUNITY
Start: 2023-12-20 | End: 2024-07-25

## 2024-05-21 ASSESSMENT — PAIN SCALES - GENERAL: PAINLEVEL: NO PAIN (0)

## 2024-05-21 NOTE — PATIENT INSTRUCTIONS
Take your medicines every day, as directed     Changes made today:  No medication changes.        Cardiology Care Coordinators:      Chitra MARQUEZ RN     Cardiology Rooming staff:  Eulalia TRONCOSO    Phone  [phone]      Fax [phone]    To Contact us     During Business Hours:  [phone]     If you are needing refills please contact your pharmacy.     For urgent after hour care please call the [facility] at [phone] or the [facility] at [phone] and ask to speak to the cardiologist on call.            HOW TO CHECK YOUR BLOOD PRESSURE AT HOME:     Avoid eating, smoking, and exercising for at least 30 minutes before taking a reading.     Be sure you have taken your BP medication at least 2-3 hours before you check it.      Sit quietly for 10 minutes before a reading.      Sit in a chair with your feet flat on the floor. Rest your  arm on a table so that the arm cuff is at the same level as your heart.     Remain still during the reading.  Record your blood pressure and pulse in a log and bring to your next appointment.       Use [employer] allows you to communicate directly with your heart team through secure messaging.  [employer] can be accessed any time on your phone, computer, or tablet.  If you need assistance, we'd be happy to help!             Keep your Heart Appointments:     Follow-up in 1 year with Dr Crespo

## 2024-05-21 NOTE — TELEPHONE ENCOUNTER
Writer called [facility] and they will be faxing over records so we can sent to HIMs

## 2024-05-21 NOTE — TELEPHONE ENCOUNTER
's: Please request records from Dr. Marleen Caballero at the [facility] in [place] per patient's [employer] message.      Kristina Kjellberg, MSN, RN

## 2024-05-21 NOTE — TELEPHONE ENCOUNTER
Received Medical records from [facility] For provider to review and placed on providers desk.

## 2024-05-21 NOTE — NURSING NOTE
"Chief Complaint   Patient presents with    Follow Up     Return general cardiology for annual       Initial BP (!) 123/91 (BP Location: Right arm, Patient Position: Sitting, Cuff Size: Adult Regular)   Pulse 104   Ht 1.588 m (5' 2.5\")   Wt 53.4 kg (117 lb 11.2 oz)   SpO2 96%   BMI 21.18 kg/m   Estimated body mass index is 21.18 kg/m  as calculated from the following:    Height as of this encounter: 1.588 m (5' 2.5\").    Weight as of this encounter: 53.4 kg (117 lb 11.2 oz)..  BP completed using cuff size: regular    Marah Villagran, EMT  "

## 2024-05-22 ENCOUNTER — E-VISIT (OUTPATIENT)
Dept: FAMILY MEDICINE | Facility: CLINIC | Age: 74
End: 2024-05-22
Payer: COMMERCIAL

## 2024-05-22 ENCOUNTER — TELEPHONE (OUTPATIENT)
Dept: FAMILY MEDICINE | Facility: CLINIC | Age: 74
End: 2024-05-22

## 2024-05-22 DIAGNOSIS — N89.8 VAGINAL DISCHARGE: Primary | ICD-10-CM

## 2024-05-22 DIAGNOSIS — B00.9 HERPES SIMPLEX VIRUS INFECTION: ICD-10-CM

## 2024-05-22 DIAGNOSIS — D47.3 ESSENTIAL THROMBOCYTHEMIA (H): Primary | ICD-10-CM

## 2024-05-22 PROCEDURE — 99421 OL DIG E/M SVC 5-10 MIN: CPT | Performed by: FAMILY MEDICINE

## 2024-05-22 RX ORDER — VALACYCLOVIR HYDROCHLORIDE 500 MG/1
500 TABLET, FILM COATED ORAL 2 TIMES DAILY
Qty: 6 TABLET | Refills: 3 | Status: SHIPPED | OUTPATIENT
Start: 2024-05-22 | End: 2024-07-25

## 2024-05-22 NOTE — PROGRESS NOTES
Virtual Visit Details    Type of service:  Video Visit     Originating Location (pt. Location): Home    Distant Location (provider location):  On-site  Platform used for Video Visit: [facility] Hematology Note  Date of visit: May 23, 2024  Outpatient Clinic Note        Assessment:     Essential thrombocythemia, approavching 22 years from the time of diagnosis, with stable platelet count on current dosing of hydroxyurea, 1000 mg p.o. Saturdays and Sundays, with 500 mg p.o. daily the rest of the week.   The chance of this becoming a major issue for her is extraordinarily low, though not zero.  There is a one in 20 chance of progressing to either myelofibrosis or an acute leukemia.  This is why I have continued  to see Kelsea and its been a pleasure to do so.  As has always been my concern, the major healthcare issue is related to her heavy smoking history. She has been struggling with worsening COPD.  She did develop a small (~1cm) lung cancer, but she was medically inoperable because of the COPD.  She is status post SBRT to that lesion.    Unfortunately her recent CT scan shows a new lesion nearby, outside of the radiated field, that is spiculated and also concerning for another lung cancer. medical oncologist, I work with some wonderful ones here at the [facility].  Multiple other psychosocial stressors discussed at length.       Plan:     Follow-up with Dr. Gillis for repeat CT scan in 4 weeks.  If the lesion remains there, and there is a high index of suspicion that it is a another lung cancer, then she would be appropriate for another course of SBRT.  Continue current dosing of hydroxyurea  Continue current dosing of aspirin  Continue follow-up with Dr. Valle  Return to clinic with me in 6 months with a CBC as we continue in follow-up mode. Per Kelsea's request, we can continue with virtual visits.      Sanjay Lambert MD, MSc  Associate Professor of Medicine  [facility]  Medical School  [facility]  [address] [place] [address]  [phone]    __________________________________________________________________    DIAGNOSES     Essential thrombocythemia, diagnosed by bone marrow biopsy on 04/10/2001.  Kelsea has low risk features but has a significantly increased risk of thrombosis because of her long-term history of smoking.  She finally quit 6 years ago  Clinical Stage I ([id number]) lung malignancy. Kelsea was felt to be medically inoperable due to poor lung function. She is s/p curative intent SBRT, completed 9/13/2023.  COPD    History of Present Illness:     Hydroxyurea at varying doses.  She has been on this for 20 years.  Her current dose is 1000 mg p.o. daily on Saturdays and Sundays, with 500 mg daily the rest of the week  She also remains on a daily aspirin.      Interval history:     Kelsea is back for her visit  Saw Dr. Gillis of Rad Onc 5/15/2024. CT Chest 5/10/2024 showed a new contralateral 1.5 cm spiculated lung nodule, outside of the radiation field. (Previously radiated nodule is smaller). She was prescribed antibiotics, with a new CT scheduled for 4 weeks from now.  Congestion getting somewhat better after being on the antibiotics.  Still present however.  She can no longer play tennis because of her severe lung issues.  She is not on home oxygen yet.  She does get short of breath with exertion.  She and her  Jason were on a 4-month cruise recently.  They had a great time.  Has been following with endocrinology.  She is on Forteo as well as vitamin D  Does not get out very much.  The weather kind of affects her breathing.  No new lumps or bumps  No bleeding or bruising  No new swelling          Past Medical History:   I have reviewed this patient's past medical history   Past Medical History:   Diagnosis Date    COPD (chronic obstructive pulmonary disease) (H) 2014    Essential thrombocythemia (H) 07/20/2022    Heart disease 2022    Lung mass  04/03/2023    S/P radiation therapy     5,000 cGy to [facility] Lung (SBRT) completed on 9/15/2023 [facility]          Past Surgical History:    I have reviewed this patient's past surgical history       Social History:   Tobacco, ETOH, and rec drugs reviewed and as noted below with the following exceptions:  Kelsea is  to Jason who is 15 years her senior, but has no biologic children.  She is originally from [place].  She has been traveling back and forth to [place] because her 93-year-old mother, who most recently lived with her and her  Jason, wanted to see family.  Kelsea's brothers think of her as a Saint.  She wanted them to share some of the burden of their mother's care.  Her mother finally passed away at Kelsea's house about a year ago (mid 2023).          Family History:     Family History   Problem Relation Age of Onset    Hypertension Mother     Hyperlipidemia Mother     Cerebrovascular Disease Father         At age 86    Prostate Cancer Father     Hypertension Brother     Bladder Cancer Brother     Lung Cancer Brother     Lymphoma Brother             Medications:     Current Outpatient Medications   Medication Sig Dispense Refill    aspirin 81 MG EC tablet 81 mg daily      calcium carbonate-vitamin D (CALTRATE) 600-10 MG-MCG per tablet Take 1 tablet by mouth 2 times daily      fluticasone (FLONASE) 50 MCG/ACT nasal spray INSTILL 2 SPRAYS INTO BOTH NOSTRILS DAILY. 48 mL 2    Fluticasone-Umeclidin-Vilanterol (TRELEGY ELLIPTA) 200-62.5-25 MCG/ACT oral inhaler Inhale 1 puff into the lungs daily 3 each 3    guaiFENesin (MUCINEX) 600 MG 12 hr tablet Take 600-1,200 mg by mouth 2 times daily      hydroxyurea (HYDREA) 500 MG capsule Take 1 capsule (500 mg) by mouth daily 5 days/week with 1000 mg the other 2 days 225 capsule 4    levalbuterol (XOPENEX HFA) 45 MCG/ACT inhaler Inhale 2 puffs into the lungs every 4 hours as needed for shortness of breath or wheezing 15 g 1    Probiotic Product  (PROBIOTIC DAILY PO) Take by mouth daily      rosuvastatin (CRESTOR) 20 MG tablet Take 1 tablet (20 mg) by mouth daily 90 tablet 1    teriparatide, recombinant, (FORTEO) 600 MCG/2.4ML SOPN injection  (Patient not taking: Reported on 5/15/2024)      vitamin D2 (ERGOCALCIFEROL) 68180 units (1250 mcg) capsule Take 50,000 Units by mouth once a week (Patient not taking: Reported on 5/21/2024)                Physical Exam:   There were no vitals taken for this visit.    ECOG PS: 0  Constitutional: WDWN female in NAD, pleasant and appropriate  Neurologic: alert, answering questions appropriately, moving all extremities spontaneously. CN 2-12 grossly intact.  Psych: appropriate affect  No further exam could be done because this was a virtual visit  Data:      Latest Reference Range & Units 10/11/23 13:16 05/23/24 11:17   WBC 4.0 - 11.0 10e3/uL 4.3 3.9 (L)   Hemoglobin 11.7 - 15.7 g/dL 13.5 13.7   Hematocrit 35.0 - 47.0 % 39.2 41.1   Platelet Count 150 - 450 10e3/uL 378 472 (H)   RBC Count 3.80 - 5.20 10e6/uL 3.40 (L) 3.69 (L)   MCV 78 - 100 fL 115 (H) 111 (H)   (L): Data is abnormally low  (H): Data is abnormally high        No results found for this or any previous visit (from the past 24 hour(s)).    Other Data     CT Chest:  1. Continued increase in left upper lobe spiculated solid nodule now  measuring 8 mm is considered lung rads 4B. Consider contrast CT and/or  biopsy.    Labs, imaging and treatment plan reviewed with patient. All questions answered.        30 minutes spent on the date of the encounter doing chart review, review of outside records, review of test results, interpretation of tests, patient visit and documentation      Retention Suture Bite Size: 3 mm

## 2024-05-23 ENCOUNTER — VIRTUAL VISIT (OUTPATIENT)
Dept: ONCOLOGY | Facility: CLINIC | Age: 74
End: 2024-05-23
Attending: INTERNAL MEDICINE
Payer: COMMERCIAL

## 2024-05-23 ENCOUNTER — LAB (OUTPATIENT)
Dept: LAB | Facility: CLINIC | Age: 74
End: 2024-05-23
Payer: COMMERCIAL

## 2024-05-23 VITALS
SYSTOLIC BLOOD PRESSURE: 123 MMHG | DIASTOLIC BLOOD PRESSURE: 91 MMHG | HEIGHT: 62 IN | BODY MASS INDEX: 21.35 KG/M2 | WEIGHT: 116 LBS

## 2024-05-23 DIAGNOSIS — D47.3 ESSENTIAL THROMBOCYTHEMIA (H): ICD-10-CM

## 2024-05-23 DIAGNOSIS — D47.3 ESSENTIAL THROMBOCYTHEMIA (H): Primary | ICD-10-CM

## 2024-05-23 LAB
ALBUMIN SERPL BCG-MCNC: 4 G/DL (ref 3.5–5.2)
ALP SERPL-CCNC: 123 U/L (ref 40–150)
ALT SERPL W P-5'-P-CCNC: 27 U/L (ref 0–50)
ANION GAP SERPL CALCULATED.3IONS-SCNC: 12 MMOL/L (ref 7–15)
AST SERPL W P-5'-P-CCNC: 37 U/L (ref 0–45)
BASOPHILS # BLD AUTO: 0 10E3/UL (ref 0–0.2)
BASOPHILS NFR BLD AUTO: 1 %
BILIRUB SERPL-MCNC: 0.3 MG/DL
BUN SERPL-MCNC: 13.5 MG/DL (ref 8–23)
CALCIUM SERPL-MCNC: 9.2 MG/DL (ref 8.8–10.2)
CHLORIDE SERPL-SCNC: 102 MMOL/L (ref 98–107)
CREAT SERPL-MCNC: 0.78 MG/DL (ref 0.51–0.95)
DEPRECATED HCO3 PLAS-SCNC: 26 MMOL/L (ref 22–29)
EGFRCR SERPLBLD CKD-EPI 2021: 81 ML/MIN/1.73M2
EOSINOPHIL # BLD AUTO: 0.2 10E3/UL (ref 0–0.7)
EOSINOPHIL NFR BLD AUTO: 4 %
ERYTHROCYTE [DISTWIDTH] IN BLOOD BY AUTOMATED COUNT: 12.4 % (ref 10–15)
GLUCOSE SERPL-MCNC: 101 MG/DL (ref 70–99)
HCT VFR BLD AUTO: 41.1 % (ref 35–47)
HGB BLD-MCNC: 13.7 G/DL (ref 11.7–15.7)
IMM GRANULOCYTES # BLD: 0 10E3/UL
IMM GRANULOCYTES NFR BLD: 1 %
LYMPHOCYTES # BLD AUTO: 0.7 10E3/UL (ref 0.8–5.3)
LYMPHOCYTES NFR BLD AUTO: 19 %
MCH RBC QN AUTO: 37.1 PG (ref 26.5–33)
MCHC RBC AUTO-ENTMCNC: 33.3 G/DL (ref 31.5–36.5)
MCV RBC AUTO: 111 FL (ref 78–100)
MONOCYTES # BLD AUTO: 0.3 10E3/UL (ref 0–1.3)
MONOCYTES NFR BLD AUTO: 9 %
NEUTROPHILS # BLD AUTO: 2.6 10E3/UL (ref 1.6–8.3)
NEUTROPHILS NFR BLD AUTO: 68 %
NRBC # BLD AUTO: 0 10E3/UL
NRBC BLD AUTO-RTO: 0 /100
PLATELET # BLD AUTO: 472 10E3/UL (ref 150–450)
POTASSIUM SERPL-SCNC: 4.2 MMOL/L (ref 3.4–5.3)
PROT SERPL-MCNC: 6.9 G/DL (ref 6.4–8.3)
RBC # BLD AUTO: 3.69 10E6/UL (ref 3.8–5.2)
SODIUM SERPL-SCNC: 140 MMOL/L (ref 135–145)
WBC # BLD AUTO: 3.9 10E3/UL (ref 4–11)

## 2024-05-23 PROCEDURE — 36415 COLL VENOUS BLD VENIPUNCTURE: CPT

## 2024-05-23 PROCEDURE — 80053 COMPREHEN METABOLIC PANEL: CPT

## 2024-05-23 PROCEDURE — 99214 OFFICE O/P EST MOD 30 MIN: CPT | Mod: 95 | Performed by: INTERNAL MEDICINE

## 2024-05-23 PROCEDURE — 85025 COMPLETE CBC W/AUTO DIFF WBC: CPT

## 2024-05-23 ASSESSMENT — PAIN SCALES - GENERAL: PAINLEVEL: NO PAIN (0)

## 2024-05-23 NOTE — TELEPHONE ENCOUNTER
Looks like patient is scheduled with me in July.  Contact her to see if she would like to do a quick virtual visit (video) if needed prior to that visit?      Per message from patient in an e-visit:  Thanks for the response. I have tried on numerous occasions to make an appointment and get a response that there are no appointments available even when I put an open ended date on it. It is very frustrating. I will make a lab appointment asap. I am sure the vaginal issue is antibiotic related. Had no issues until I took the zpack.

## 2024-05-23 NOTE — LETTER
5/23/2024         RE: Sienna Bennett  [address]  [place] [address]        Dear Colleague,    Thank you for referring your patient, Sienna Bennett, to the [facility]. Please see a copy of my visit note below.    Virtual Visit Details    Type of service:  Video Visit     Originating Location (pt. Location): Home    Distant Location (provider location):  On-site  Platform used for Video Visit: [facility] Hematology Note  Date of visit: May 23, 2024  Outpatient Clinic Note        Assessment:     Essential thrombocythemia, approavching 22 years from the time of diagnosis, with stable platelet count on current dosing of hydroxyurea, 1000 mg p.o. Saturdays and Sundays, with 500 mg p.o. daily the rest of the week.   The chance of this becoming a major issue for her is extraordinarily low, though not zero.  There is a one in 20 chance of progressing to either myelofibrosis or an acute leukemia.  This is why I have continued  to see Kelsea and its been a pleasure to do so.  As has always been my concern, the major healthcare issue is related to her heavy smoking history. She has been struggling with worsening COPD.  She did develop a small (~1cm) lung cancer, but she was medically inoperable because of the COPD.  She is status post SBRT to that lesion.    Unfortunately her recent CT scan shows a new lesion nearby, outside of the radiated field, that is spiculated and also concerning for another lung cancer. medical oncologist, I work with some wonderful ones here at the [facility].  Multiple other psychosocial stressors discussed at length.       Plan:     Follow-up with Dr. Gillis for repeat CT scan in 4 weeks.  If the lesion remains there, and there is a high index of suspicion that it is a another lung cancer, then she would be appropriate for another course of SBRT.  Continue current dosing of hydroxyurea  Continue current dosing of  aspirin  Continue follow-up with Dr. Valle  Return to clinic with me in 6 months with a CBC as we continue in follow-up mode. Per Kelsea's request, we can continue with virtual visits.      Sanjay Lambert MD, MSc  Associate Professor of Medicine  [employer]  [facility]  [address] [place] [address]  [phone]    __________________________________________________________________    DIAGNOSES     Essential thrombocythemia, diagnosed by bone marrow biopsy on 04/10/2001.  Kelsea has low risk features but has a significantly increased risk of thrombosis because of her long-term history of smoking.  She finally quit 6 years ago  Clinical Stage I ([id number]) lung malignancy. Kelsea was felt to be medically inoperable due to poor lung function. She is s/p curative intent SBRT, completed 9/13/2023.  COPD    History of Present Illness:     Hydroxyurea at varying doses.  She has been on this for 20 years.  Her current dose is 1000 mg p.o. daily on Saturdays and Sundays, with 500 mg daily the rest of the week  She also remains on a daily aspirin.      Interval history:     Kelsea is back for her visit  Saw Dr. Gillis of Rad Onc 5/15/2024. CT Chest 5/10/2024 showed a new contralateral 1.5 cm spiculated lung nodule, outside of the radiation field. (Previously radiated nodule is smaller). She was prescribed antibiotics, with a new CT scheduled for 4 weeks from now.  Congestion getting somewhat better after being on the antibiotics.  Still present however.  She can no longer play tennis because of her severe lung issues.  She is not on home oxygen yet.  She does get short of breath with exertion.  She and her  Jason were on a 4-month cruise recently.  They had a great time.  Has been following with endocrinology.  She is on Forteo as well as vitamin D  Does not get out very much.  The weather kind of affects her breathing.  No new lumps or bumps  No bleeding or bruising  No  new swelling          Past Medical History:   I have reviewed this patient's past medical history   Past Medical History:   Diagnosis Date    COPD (chronic obstructive pulmonary disease) (H) 2014    Essential thrombocythemia (H) 07/20/2022    Heart disease 2022    Lung mass 04/03/2023    S/P radiation therapy     5,000 cGy to VARSHA Lung (SBRT) completed on 9/15/2023 [facility]          Past Surgical History:    I have reviewed this patient's past surgical history       Social History:   Tobacco, ETOH, and rec drugs reviewed and as noted below with the following exceptions:  Kelsea is  to Jason who is 15 years her senior, but has no biologic children.  She is originally from [place].  She has been traveling back and forth to [place] because her 93-year-old mother, who most recently lived with her and her  Jason, wanted to see family.  Kelsea's brothers think of her as a Saint.  She wanted them to share some of the burden of their mother's care.  Her mother finally passed away at Kelsea's house about a year ago (mid 2023).          Family History:     Family History   Problem Relation Age of Onset    Hypertension Mother     Hyperlipidemia Mother     Cerebrovascular Disease Father         At age 86    Prostate Cancer Father     Hypertension Brother     Bladder Cancer Brother     Lung Cancer Brother     Lymphoma Brother             Medications:     Current Outpatient Medications   Medication Sig Dispense Refill    aspirin 81 MG EC tablet 81 mg daily      calcium carbonate-vitamin D (CALTRATE) 600-10 MG-MCG per tablet Take 1 tablet by mouth 2 times daily      fluticasone (FLONASE) 50 MCG/ACT nasal spray INSTILL 2 SPRAYS INTO BOTH NOSTRILS DAILY. 48 mL 2    Fluticasone-Umeclidin-Vilanterol (TRELEGY ELLIPTA) 200-62.5-25 MCG/ACT oral inhaler Inhale 1 puff into the lungs daily 3 each 3    guaiFENesin (MUCINEX) 600 MG 12 hr tablet Take 600-1,200 mg by mouth 2 times daily      hydroxyurea (HYDREA) 500 MG  capsule Take 1 capsule (500 mg) by mouth daily 5 days/week with 1000 mg the other 2 days 225 capsule 4    levalbuterol (XOPENEX HFA) 45 MCG/ACT inhaler Inhale 2 puffs into the lungs every 4 hours as needed for shortness of breath or wheezing 15 g 1    Probiotic Product (PROBIOTIC DAILY PO) Take by mouth daily      rosuvastatin (CRESTOR) 20 MG tablet Take 1 tablet (20 mg) by mouth daily 90 tablet 1    teriparatide, recombinant, (FORTEO) 600 MCG/2.4ML SOPN injection  (Patient not taking: Reported on 5/15/2024)      vitamin D2 (ERGOCALCIFEROL) 26258 units (1250 mcg) capsule Take 50,000 Units by mouth once a week (Patient not taking: Reported on 5/21/2024)                Physical Exam:   There were no vitals taken for this visit.    ECOG PS: 0  Constitutional: WDWN female in NAD, pleasant and appropriate  Neurologic: alert, answering questions appropriately, moving all extremities spontaneously. CN 2-12 grossly intact.  Psych: appropriate affect  No further exam could be done because this was a virtual visit  Data:      Latest Reference Range & Units 10/11/23 13:16 05/23/24 11:17   WBC 4.0 - 11.0 10e3/uL 4.3 3.9 (L)   Hemoglobin 11.7 - 15.7 g/dL 13.5 13.7   Hematocrit 35.0 - 47.0 % 39.2 41.1   Platelet Count 150 - 450 10e3/uL 378 472 (H)   RBC Count 3.80 - 5.20 10e6/uL 3.40 (L) 3.69 (L)   MCV 78 - 100 fL 115 (H) 111 (H)   (L): Data is abnormally low  (H): Data is abnormally high        No results found for this or any previous visit (from the past 24 hour(s)).    Other Data     CT Chest:  1. Continued increase in left upper lobe spiculated solid nodule now  measuring 8 mm is considered lung rads 4B. Consider contrast CT and/or  biopsy.    Labs, imaging and treatment plan reviewed with patient. All questions answered.        30 minutes spent on the date of the encounter doing chart review, review of outside records, review of test results, interpretation of tests, patient visit and documentation

## 2024-05-23 NOTE — TELEPHONE ENCOUNTER
Spoke to pt and she has a lab appt scheduled tomorrow to complete the wet prep. Pt said she would do whatever is needed to get an abx. I tried to scheduled pt with PCP for virtual visit as recommended but next virtual visit is not until 6/11. I did also look at opening tomorrow with other providers and there is only one SD slot with Charlee. Will see if PCP wants to work pt in or if I should offer an appt at another clinic

## 2024-05-23 NOTE — NURSING NOTE
Is the patient currently in the state of [place]? YES    Visit mode:VIDEO    If the visit is dropped, the patient can be reconnected by: VIDEO VISIT: Send to e-mail at: [email or web address]    Will anyone else be joining the visit? NO  (If patient encounters technical issues they should call [phone]    How would you like to obtain your AVS? MyChart    Are changes needed to the allergy or medication list? No  Reason for visit: Video Visit (Follow Up )    Enid DIAMOND

## 2024-05-24 ENCOUNTER — LAB (OUTPATIENT)
Dept: LAB | Facility: CLINIC | Age: 74
End: 2024-05-24
Payer: COMMERCIAL

## 2024-05-24 DIAGNOSIS — N89.8 VAGINAL DISCHARGE: ICD-10-CM

## 2024-05-24 LAB
CLUE CELLS: ABNORMAL
TRICHOMONAS, WET PREP: ABNORMAL
WBC'S/HIGH POWER FIELD, WET PREP: ABNORMAL
YEAST, WET PREP: ABNORMAL

## 2024-05-24 PROCEDURE — 87210 SMEAR WET MOUNT SALINE/INK: CPT

## 2024-05-24 NOTE — RESULT ENCOUNTER NOTE
Hi Kelsea,  Your vaginal infection screening test, wet prep (screens for yeast/bacteria/trichomonas infections), was normal.  No signs of a yeast infection were noted.  The reason I had to do the testing was typical discharge with a yeast infection is whitish (not the yellow-green you described) and is usually associated with symptoms such as vaginal itching, irritation or burning.   It is possible your discharge could be from something else such as a herpes infection.  Certainly, since symptoms started after the antibiotics it might be worthwhile to empirically try a course of over-the-counter Monistat cream for 3 days.  This is an anti-yeast cream that will treat most low-level yeast infections.  It would be fine to use even though we have not seen a formal infection on a trial basis.  Please MyChart or call if you have any concerns or questions.   Kind regards,  Kalli Valle MD

## 2024-05-24 NOTE — TELEPHONE ENCOUNTER
Provided we can address her acute concern with her lab visit/wet prep I do not think there is urgency to her appointment.  Okay to keep appointment as scheduled in July for offer her the virtual visit on 6/11.

## 2024-05-29 ENCOUNTER — OFFICE VISIT (OUTPATIENT)
Dept: DERMATOLOGY | Facility: CLINIC | Age: 74
End: 2024-05-29
Payer: COMMERCIAL

## 2024-05-29 DIAGNOSIS — L81.4 LENTIGINES: ICD-10-CM

## 2024-05-29 DIAGNOSIS — Z85.828 HISTORY OF NONMELANOMA SKIN CANCER: Primary | ICD-10-CM

## 2024-05-29 DIAGNOSIS — L57.0 ACTINIC KERATOSIS: ICD-10-CM

## 2024-05-29 DIAGNOSIS — D18.01 CHERRY ANGIOMA: ICD-10-CM

## 2024-05-29 DIAGNOSIS — L82.1 SEBORRHEIC KERATOSES: ICD-10-CM

## 2024-05-29 DIAGNOSIS — D09.9 SQUAMOUS CELL CARCINOMA IN SITU (SCCIS): ICD-10-CM

## 2024-05-29 DIAGNOSIS — B00.9 HERPES: ICD-10-CM

## 2024-05-29 DIAGNOSIS — L72.9 SKIN CYST: ICD-10-CM

## 2024-05-29 DIAGNOSIS — L72.0 EIC (EPIDERMAL INCLUSION CYST): ICD-10-CM

## 2024-05-29 DIAGNOSIS — D22.9 MULTIPLE BENIGN NEVI: ICD-10-CM

## 2024-05-29 PROCEDURE — 99214 OFFICE O/P EST MOD 30 MIN: CPT | Mod: 25 | Performed by: PHYSICIAN ASSISTANT

## 2024-05-29 PROCEDURE — 88304 TISSUE EXAM BY PATHOLOGIST: CPT | Performed by: DERMATOLOGY

## 2024-05-29 PROCEDURE — 11440 EXC FACE-MM B9+MARG 0.5 CM/<: CPT | Performed by: PHYSICIAN ASSISTANT

## 2024-05-29 RX ORDER — VALACYCLOVIR HYDROCHLORIDE 500 MG/1
500 TABLET, FILM COATED ORAL DAILY
Qty: 90 TABLET | Refills: 3 | Status: SHIPPED | OUTPATIENT
Start: 2024-05-29

## 2024-05-29 RX ORDER — CHOLECALCIFEROL (VITAMIN D3) 125 MCG
CAPSULE ORAL DAILY
COMMUNITY
End: 2024-07-25

## 2024-05-29 NOTE — LETTER
5/29/2024         RE: Sienna Bennett  [address]  [place] [address]        Dear Colleague,    Thank you for referring your patient, Sienna Bennett, to the [facility]. Please see a copy of my visit note below.    [facility] Dermatology Note  Encounter Date: May 29, 2024  Office Visit      Dermatology Problem List:  FBSE: 5/29/24    1. AK- cryo   2. Hx NMSC  - SCCIS -L gluteal cleft -  ED&C 8/31/22  3. EIC x 1 L cheek - S/p punch excision 5/29/24   4. 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      Social:   ____________________________________________    Assessment & Plan:    # HSV- R buttock - has been having increased frequency of flares lately, about 1x per month for last 3 consecutive months. Would like trial of daily suppressive therapy.  - Start on Valtrex 500 mg to take daily     # epidermal inclusion cyst x 1 L cheek    - punch excision performed today - see procedure below    # History of nonmelanoma skin cancer, no clincial evidence of recurrence.   - Sunscreen: Apply 20 minutes prior to going outdoors and reapply every two hours, when wet or sweating. We recommend using an SPF 30 or higher, and to use one that is water resistant.     - Advised to monitor for changing, non-healing, bleeding, painful, changing, or otherwise symptomatic lesions  - Continue bi-annual skin exams     # Benign findings: multiple benign nevi, lentigines, cherry angiomas, SKs  - edu on benign etiology  - Signs and Symptoms of non-melanoma skin cancer and ABCDEs of melanoma reviewed with patient. Patient encouraged to perform monthly self skin exams and educated on how to perform them. UV precautions reviewed with patient. Patient was asked about new or changing moles/lesions on body.   - Sunscreen: Apply 20 minutes prior to going outdoors and reapply every two hours, when wet or sweating. We recommend using an SPF 30 or higher, and to use one that is water  resistant.     - RTC for changes    Procedures Performed:     NAME OF PROCEDURE: PUNCH EXCISION AND INTERMEDIATE CLOSURE    Surgeon: Amber Baumann PA-C    PREOPERATIVE DIAGNOSIS: EIC  POSTOPERATIVE DIAGNOSIS: Same  FINAL EXCISION SIZE: 4mm lesion with 0mm margins  TOTAL EXCISED DIAMETER: 4mm  FINAL REPAIR LENGTH:  4mm    INDICATIONS:  This patient presented with a 4mm x 4mm EIC on the L cheek. Excision was indicated. We discussed the principles of treatment and most likely complications including bleeding, infection, wound dehiscence, pain, nerve damage, and scarring. Informed consent was obtained and the patient underwent the procedure as follows.    PROCEDURE:  The patient was taken to the operative suite. The treatment area was anesthetized with 1% lidocaine with [id number] epinephrine. The area was washed with Hibiclens, rinsed with saline and draped with sterile towels. The lesion was delineated and excised down to subcutaneous fat using a 4mm punch tool. Hemostasis was obtained by electrocoagulation. With a margin of 0mm, the final excision size was 4mm x 4mm.      REPAIR:  In order to repair this defect while maintaining the normal anatomic relations and function, we elected to utilize an intermediate linear closure. Closure was oriented so that the wound was in the patient's natural skin tension lines.  Final cutaneous approximation was achieved with 5-0 Prolene simple running sutures.     The final wound length was 4mm.  A total of 1mL of anesthesia was administered for all surgical sites. Estimated blood loss was less than 1mL for all surgical sites. A sterile pressure dressing was applied and wound care instructions, with a written handout, were given. The patient was discharged alert and ambulatory.    Follow-up: 1 year(s) in-person, or earlier for new or changing lesions    Staff and scribe:    Scribe Disclosure:   JESSIE FARAH, am serving as a scribe; to document services personally performed by  Amber Baumann PA-C -based on data collection and the provider's statements to me.     Provider Disclosure:  I agree with above History, Review of Systems, Physical exam and Plan.  I have reviewed the content of the documentation and have edited it as needed. I have personally performed the services documented here and the documentation accurately represents those services and the decisions I have made.      Electronically signed by:    All risks, benefits and alternatives were discussed with patient.  Patient is in agreement and understands the assessment and plan.  All questions were answered.    Amber Baumann PA-C, MPAS  [facility]: Phone: [phone], Fax: [phone]  [facility]: Phone: [phone],  Fax: [phone]  [facility]: Phone: [phone], Fax: [phone]  ____________________________________________    CC: Skin Check (Pt is here for FBSC. No areas of concern.)      Reviewed patients past medical history and pertinent chart review prior to patient's visit today.     HPI:  Ms. Sienna Bennett is a 73 year old female who presents today as a return patient for FBSC. Last time she was seen Derm was by myself on 9/13/23 and had a few Ak's treated with cryotherapy.     Today patient did not report any spots of concern.     Has a hx of NMSC     Patient is otherwise feeling well, without additional concerns.    Labs:  N/A    Physical Exam:  Vitals: There were no vitals taken for this visit.  SKIN: Total skin excluding the undergarment areas was performed. The exam included the head/face, neck, both arms, chest, back, abdomen, both legs, digits and/or nails.    - Johnson's skin type II, has <100 nevi  - There are dome shaped bright red papules on the trunk.   - Multiple regular brown pigmented macules and papules are identified on the trunk and extremities.   - Scattered brown  macules on sun exposed areas.  - There are waxy stuck on tan to brown papules on the trunk.    - There is a raised dome shaped 4 mm nodule with a central punctum located on Lcheek x1  - R buttock there is a bandage - deferred exam today  - No other lesions of concern on areas examined.     Medications:  Current Outpatient Medications   Medication Sig Dispense Refill     aspirin 81 MG EC tablet 81 mg daily       calcium carbonate-vitamin D (CALTRATE) 600-10 MG-MCG per tablet Take 1 tablet by mouth 2 times daily       Cholecalciferol (VITAMIN D) 125 MCG (5000 UT) capsule Take by mouth daily       fluticasone (FLONASE) 50 MCG/ACT nasal spray INSTILL 2 SPRAYS INTO BOTH NOSTRILS DAILY. 48 mL 2     Fluticasone-Umeclidin-Vilanterol (TRELEGY ELLIPTA) 200-62.5-25 MCG/ACT oral inhaler Inhale 1 puff into the lungs daily 3 each 3     guaiFENesin (MUCINEX) 600 MG 12 hr tablet Take 600-1,200 mg by mouth 2 times daily       hydroxyurea (HYDREA) 500 MG capsule Take 1 capsule (500 mg) by mouth daily 5 days/week with 1000 mg the other 2 days 225 capsule 4     levalbuterol (XOPENEX HFA) 45 MCG/ACT inhaler Inhale 2 puffs into the lungs every 4 hours as needed for shortness of breath or wheezing 15 g 1     Probiotic Product (PROBIOTIC DAILY PO) Take by mouth daily       rosuvastatin (CRESTOR) 20 MG tablet Take 1 tablet (20 mg) by mouth daily 90 tablet 1     teriparatide, recombinant, (FORTEO) 600 MCG/2.4ML SOPN injection        valACYclovir (VALTREX) 500 MG tablet Take 1 tablet (500 mg) by mouth 2 times daily for 3 days For outbreak of herpes. 6 tablet 3     vitamin D2 (ERGOCALCIFEROL) 69321 units (1250 mcg) capsule Take 50,000 Units by mouth once a week (Patient not taking: Reported on 5/29/2024)       No current facility-administered medications for this visit.      Past Medical/Surgical History:   Patient Active Problem List   Diagnosis     Abnormal TSH     Impaired fasting glucose     Hyperlipidemia LDL goal <100     Chronic  obstructive pulmonary disease, unspecified COPD type (H)     Abnormal CT scan, chest     Essential thrombocythemia (H)     Herpes simplex virus infection     Lung nodules     Neck pain     Chronic bilateral low back pain without sciatica     Osteoporosis     Compression fracture of L4 vertebra with routine healing     Past Medical History:   Diagnosis Date     COPD (chronic obstructive pulmonary disease) (H) 2014     Essential thrombocythemia (H) 07/20/2022     Heart disease 2022     Lung mass 04/03/2023     S/P radiation therapy     5,000 cGy to VARSHA Lung (SBRT) completed on 9/15/2023 [facility]                        The following medication was given:     MEDICATION:  Lidocaine with epinephrine 1% [id number]  ROUTE: SQ  SITE: see procedure note  DOSE:1.5mL  LOT #: [id number]  : [employer]  EXPIRATION DATE: 05-  NDC#: 56401-441-97  Was there drug waste? Yes, .5mL  Multi-dose vial: Yes    Flora Mckinnon LPN  May 29, 2024      Again, thank you for allowing me to participate in the care of your patient.        Sincerely,        Amber Baumann PA-C

## 2024-05-29 NOTE — PROGRESS NOTES
The following medication was given:     MEDICATION:  Lidocaine with epinephrine 1% [id number]  ROUTE: SQ  SITE: see procedure note  DOSE:1.5mL  LOT #: [id number]  : [employer]  EXPIRATION DATE: 05-  NDC#: 67625-752-97  Was there drug waste? Yes, .5mL  Multi-dose vial: Yes    Flora Mckinnon LPN  May 29, 2024

## 2024-05-29 NOTE — PROGRESS NOTES
[facility] Dermatology Note  Encounter Date: May 29, 2024  Office Visit      Dermatology Problem List:  FBSE: 5/29/24    1. AK- cryo   2. Hx NMSC  - SCCIS -L gluteal cleft -  ED&C 8/31/22  3. EIC x 1 L cheek - S/p punch excision 5/29/24   4. 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      Social:   ____________________________________________    Assessment & Plan:    # HSV- R buttock - has been having increased frequency of flares lately, about 1x per month for last 3 consecutive months. Would like trial of daily suppressive therapy.  - Start on Valtrex 500 mg to take daily     # epidermal inclusion cyst x 1 L cheek    - punch excision performed today - see procedure below    # History of nonmelanoma skin cancer, no clincial evidence of recurrence.   - Sunscreen: Apply 20 minutes prior to going outdoors and reapply every two hours, when wet or sweating. We recommend using an SPF 30 or higher, and to use one that is water resistant.     - Advised to monitor for changing, non-healing, bleeding, painful, changing, or otherwise symptomatic lesions  - Continue bi-annual skin exams     # Benign findings: multiple benign nevi, lentigines, cherry angiomas, SKs  - edu on benign etiology  - Signs and Symptoms of non-melanoma skin cancer and ABCDEs of melanoma reviewed with patient. Patient encouraged to perform monthly self skin exams and educated on how to perform them. UV precautions reviewed with patient. Patient was asked about new or changing moles/lesions on body.   - Sunscreen: Apply 20 minutes prior to going outdoors and reapply every two hours, when wet or sweating. We recommend using an SPF 30 or higher, and to use one that is water resistant.     - RTC for changes    Procedures Performed:     NAME OF PROCEDURE: PUNCH EXCISION AND INTERMEDIATE CLOSURE    Surgeon: Amber Baumann PA-C    PREOPERATIVE DIAGNOSIS: EIC  POSTOPERATIVE DIAGNOSIS: Same  FINAL EXCISION SIZE: 4mm lesion with 0mm  margins  TOTAL EXCISED DIAMETER: 4mm  FINAL REPAIR LENGTH:  4mm    INDICATIONS:  This patient presented with a 4mm x 4mm EIC on the L cheek. Excision was indicated. We discussed the principles of treatment and most likely complications including bleeding, infection, wound dehiscence, pain, nerve damage, and scarring. Informed consent was obtained and the patient underwent the procedure as follows.    PROCEDURE:  The patient was taken to the operative suite. The treatment area was anesthetized with 1% lidocaine with [id number] epinephrine. The area was washed with Hibiclens, rinsed with saline and draped with sterile towels. The lesion was delineated and excised down to subcutaneous fat using a 4mm punch tool. Hemostasis was obtained by electrocoagulation. With a margin of 0mm, the final excision size was 4mm x 4mm.      REPAIR:  In order to repair this defect while maintaining the normal anatomic relations and function, we elected to utilize an intermediate linear closure. Closure was oriented so that the wound was in the patient's natural skin tension lines.  Final cutaneous approximation was achieved with 5-0 Prolene simple running sutures.     The final wound length was 4mm.  A total of 1mL of anesthesia was administered for all surgical sites. Estimated blood loss was less than 1mL for all surgical sites. A sterile pressure dressing was applied and wound care instructions, with a written handout, were given. The patient was discharged alert and ambulatory.    Follow-up: 1 year(s) in-person, or earlier for new or changing lesions    Staff and scribe:    Scribe Disclosure:   I, JESSIE REED, am serving as a scribe; to document services personally performed by Amber Baumann PA-C -based on data collection and the provider's statements to me.     Provider Disclosure:  I agree with above History, Review of Systems, Physical exam and Plan.  I have reviewed the content of the documentation and have edited it as  needed. I have personally performed the services documented here and the documentation accurately represents those services and the decisions I have made.      Electronically signed by:    All risks, benefits and alternatives were discussed with patient.  Patient is in agreement and understands the assessment and plan.  All questions were answered.    Amber Baumann PA-C, MPAS  [facility]: Phone: [phone], Fax: [phone]  [facility]: Phone: [phone],  Fax: [phone]  [facility]: Phone: [phone], Fax: [phone]  ____________________________________________    CC: Skin Check (Pt is here for FBSC. No areas of concern.)      Reviewed patients past medical history and pertinent chart review prior to patient's visit today.     HPI:  Ms. Sienna Bennett is a 73 year old female who presents today as a return patient for FBSC. Last time she was seen Derm was by myself on 9/13/23 and had a few Ak's treated with cryotherapy.     Today patient did not report any spots of concern.     Has a hx of NMSC     Patient is otherwise feeling well, without additional concerns.    Labs:  N/A    Physical Exam:  Vitals: There were no vitals taken for this visit.  SKIN: Total skin excluding the undergarment areas was performed. The exam included the head/face, neck, both arms, chest, back, abdomen, both legs, digits and/or nails.    - Johnson's skin type II, has <100 nevi  - There are dome shaped bright red papules on the trunk.   - Multiple regular brown pigmented macules and papules are identified on the trunk and extremities.   - Scattered brown macules on sun exposed areas.  - There are waxy stuck on tan to brown papules on the trunk.    - There is a raised dome shaped 4 mm nodule with a central punctum located on Lcheek x1  - R buttock there is a bandage - deferred exam today  - No other lesions  of concern on areas examined.     Medications:  Current Outpatient Medications   Medication Sig Dispense Refill    aspirin 81 MG EC tablet 81 mg daily      calcium carbonate-vitamin D (CALTRATE) 600-10 MG-MCG per tablet Take 1 tablet by mouth 2 times daily      Cholecalciferol (VITAMIN D) 125 MCG (5000 UT) capsule Take by mouth daily      fluticasone (FLONASE) 50 MCG/ACT nasal spray INSTILL 2 SPRAYS INTO BOTH NOSTRILS DAILY. 48 mL 2    Fluticasone-Umeclidin-Vilanterol (TRELEGY ELLIPTA) 200-62.5-25 MCG/ACT oral inhaler Inhale 1 puff into the lungs daily 3 each 3    guaiFENesin (MUCINEX) 600 MG 12 hr tablet Take 600-1,200 mg by mouth 2 times daily      hydroxyurea (HYDREA) 500 MG capsule Take 1 capsule (500 mg) by mouth daily 5 days/week with 1000 mg the other 2 days 225 capsule 4    levalbuterol (XOPENEX HFA) 45 MCG/ACT inhaler Inhale 2 puffs into the lungs every 4 hours as needed for shortness of breath or wheezing 15 g 1    Probiotic Product (PROBIOTIC DAILY PO) Take by mouth daily      rosuvastatin (CRESTOR) 20 MG tablet Take 1 tablet (20 mg) by mouth daily 90 tablet 1    teriparatide, recombinant, (FORTEO) 600 MCG/2.4ML SOPN injection       valACYclovir (VALTREX) 500 MG tablet Take 1 tablet (500 mg) by mouth 2 times daily for 3 days For outbreak of herpes. 6 tablet 3    vitamin D2 (ERGOCALCIFEROL) 65282 units (1250 mcg) capsule Take 50,000 Units by mouth once a week (Patient not taking: Reported on 5/29/2024)       No current facility-administered medications for this visit.      Past Medical/Surgical History:   Patient Active Problem List   Diagnosis    Abnormal TSH    Impaired fasting glucose    Hyperlipidemia LDL goal <100    Chronic obstructive pulmonary disease, unspecified COPD type (H)    Abnormal CT scan, chest    Essential thrombocythemia (H)    Herpes simplex virus infection    Lung nodules    Neck pain    Chronic bilateral low back pain without sciatica    Osteoporosis    Compression fracture of L4  vertebra with routine healing     Past Medical History:   Diagnosis Date    COPD (chronic obstructive pulmonary disease) (H) 2014    Essential thrombocythemia (H) 07/20/2022    Heart disease 2022    Lung mass 04/03/2023    S/P radiation therapy     5,000 cGy to [facility] (SBRT) completed on 9/15/2023 [facility]

## 2024-05-29 NOTE — NURSING NOTE
Sienna Bennett's goals for this visit include:   Chief Complaint   Patient presents with    Skin Check     Pt is here for FBSC. No areas of concern.       She requests these members of her care team be copied on today's visit information:     PCP: Kalli Valle    Referring Provider:  Referred Self, MD  No address on file    There were no vitals taken for this visit.    Do you need any medication refills at today's visit?     Flora Mckinnon LPN on 5/29/2024 at 2:12 PM

## 2024-05-29 NOTE — PATIENT INSTRUCTIONS
Patient Education       Proper skin care from [facility]:    -Eliminate harsh soaps as they strip the natural oils from the skin, often resulting in dry itchy skin ( i.e. Dial, Zest, Japanese Spring)  -Use mild soaps such as Cetaphil or Dove Sensitive Skin in the shower. You do not need to use soap on arms, legs, and trunk every time you shower unless visibly soiled.   -Avoid hot or cold showers.  -After showering, lightly dry off and apply moisturizing within 2-3 minutes. This will help trap moisture in the skin.   -Aggressive use of a moisturizer at least 1-2 times a day to the entire body (including -Vanicream, Cetaphil, Aquaphor or Cerave) and moisturize hands after every washing.  -We recommend using moisturizers that come in a tub that needs to be scooped out, not a pump. This has more of an oil base. It will hold moisture in your skin much better than a water base moisturizer. The above recommended are non-pore clogging.      Wear a sunscreen with at least SPF 30 on your face, ears, neck and V of the chest daily. Wear sunscreen on other areas of the body if those areas are exposed to the sun throughout the day. Sunscreens can contain physical and/or chemical blockers. Physical blockers are less likely to clog pores, these include zinc oxide and titanium dioxide. Reapply every two hour and after swimming.     Sunscreen examples: https://www.ewg.org/sunscreen/    UV radiation  UVA radiation remains constant throughout the day and throughout the year. It is a longer wavelength than UVB and therefore penetrates deeper into the skin leading to immediate and delayed tanning, photoaging, and skin cancer. 70-80% of UVA and UVB radiation occurs between the hours of 10am-2pm.  UVB radiation  UVB radiation causes the most harmful effects and is more significant during the summer months. However, snow and ice can reflect UVB radiation leading to skin damage during the winter months as well. UVB radiation is  responsible for tanning, burning, inflammation, delayed erythema (pinkness), pigmentation (brown spots), and skin cancer.     I recommend self monthly full body exams and yearly full body exams with a dermatology provider. If you develop a new or changing lesion please follow up for examination. Most skin cancers are pink and scaly or pink and pearly. However, we do see blue/brown/black skin cancers.  Consider the ABCDEs of melanoma when giving yourself your monthly full body exam ( don't forget the groin, buttocks, feet, toes, etc). A-asymmetry, B-borders, C-color, D-diameter, E-elevation or evolving. If you see any of these changes please follow up in clinic. If you cannot see your back I recommend purchasing a hand held mirror to use with a larger wall mirror.       Checking for Skin Cancer  You can find cancer early by checking your skin each month. There are 3 kinds of skin cancer. They are melanoma, basal cell carcinoma, and squamous cell carcinoma. Doing monthly skin checks is the best way to find new marks or skin changes. Follow the instructions below for checking your skin.   The ABCDEs of checking moles for melanoma   Check your moles or growths for signs of melanoma using ABCDE:   Asymmetry: the sides of the mole or growth don t match  Border: the edges are ragged, notched, or blurred  Color: the color within the mole or growth varies  Diameter: the mole or growth is larger than 6 mm (size of a pencil eraser)  Evolving: the size, shape, or color of the mole or growth is changing (evolving is not shown in the images below)    Checking for other types of skin cancer  Basal cell carcinoma or squamous cell carcinoma have symptoms such as:     A spot or mole that looks different from all other marks on your skin  Changes in how an area feels, such as itching, tenderness, or pain  Changes in the skin's surface, such as oozing, bleeding, or scaliness  A sore that does not heal  New swelling or redness beyond  the border of a mole    Who s at risk?  Anyone can get skin cancer. But you are at greater risk if you have:   Fair skin, light-colored hair, or light-colored eyes  Many moles or abnormal moles on your skin  A history of sunburns from sunlight or tanning beds  A family history of skin cancer  A history of exposure to radiation or chemicals  A weakened immune system  If you have had skin cancer in the past, you are at risk for recurring skin cancer.   How to check your skin  Do your monthly skin checkups in front of a full-length mirror. Check all parts of your body, including your:   Head (ears, face, neck, and scalp)  Torso (front, back, and sides)  Arms (tops, undersides, upper, and lower armpits)  Hands (palms, backs, and fingers, including under the nails)  Buttocks and genitals  Legs (front, back, and sides)  Feet (tops, soles, toes, including under the nails, and between toes)  If you have a lot of moles, take digital photos of them each month. Make sure to take photos both up close and from a distance. These can help you see if any moles change over time.   Most skin changes are not cancer. But if you see any changes in your skin, call your doctor right away. Only he or she can diagnose a problem. If you have skin cancer, seeing your doctor can be the first step toward getting the treatment that could save your life.   [employer] last reviewed this educational content on 4/1/2019 2000-2020 [employer]. [address] [place] [address], [place] [address]. All rights reserved. This information is not intended as a substitute for professional medical care. Always follow your healthcare professional's instructions.

## 2024-06-02 LAB
PATH REPORT.COMMENTS IMP SPEC: NORMAL
PATH REPORT.COMMENTS IMP SPEC: NORMAL
PATH REPORT.FINAL DX SPEC: NORMAL
PATH REPORT.GROSS SPEC: NORMAL
PATH REPORT.MICROSCOPIC SPEC OTHER STN: NORMAL
PATH REPORT.RELEVANT HX SPEC: NORMAL

## 2024-06-06 ENCOUNTER — ALLIED HEALTH/NURSE VISIT (OUTPATIENT)
Dept: DERMATOLOGY | Facility: CLINIC | Age: 74
End: 2024-06-06
Payer: COMMERCIAL

## 2024-06-06 DIAGNOSIS — Z48.02 ENCOUNTER FOR REMOVAL OF SUTURES: Primary | ICD-10-CM

## 2024-06-06 PROCEDURE — 99207 PR NO CHARGE NURSE ONLY: CPT | Performed by: DERMATOLOGY

## 2024-06-06 NOTE — NURSING NOTE
Siennarosi Bennett's goals for this visit include:   Chief Complaint   Patient presents with    Nurse Visit     Patient is here for a suture removal on the L cheek        She requests these members of her care team be copied on today's visit information:     PCP: Kalli Valle    Referring Provider: Ibis XIONG  Referred MD Ezio  No address on file    There were no vitals taken for this visit.    Do you need any medication refills at today's visit?       Ashlee Shrestha EMT          Dermatology Suture Removal Record  S: Sienna presents to the clinic today for  removal of sutures.  The patient has had the sutures in place for 8 days.    There has been no history of infection or drainage.    O: 2 sutures are seen located on the L cheek.  The wound is healing well with no signs of infection.      A: Suture removal.    P:  All sutures were easily removed today.  Routine wound care discussed.  The patient will follow up as needed.       Supervising physician is

## 2024-07-03 ENCOUNTER — ANCILLARY PROCEDURE (OUTPATIENT)
Dept: CT IMAGING | Facility: CLINIC | Age: 74
End: 2024-07-03
Attending: SURGERY
Payer: COMMERCIAL

## 2024-07-03 DIAGNOSIS — C34.12 PRIMARY MALIGNANT NEOPLASM OF LEFT UPPER LOBE OF LUNG (H): ICD-10-CM

## 2024-07-03 LAB
CREAT BLD-MCNC: 1.1 MG/DL (ref 0.5–1)
EGFRCR SERPLBLD CKD-EPI 2021: 53 ML/MIN/1.73M2

## 2024-07-03 PROCEDURE — 82565 ASSAY OF CREATININE: CPT

## 2024-07-03 PROCEDURE — 71270 CT THORAX DX C-/C+: CPT | Performed by: RADIOLOGY

## 2024-07-03 RX ORDER — IOPAMIDOL 755 MG/ML
57 INJECTION, SOLUTION INTRAVASCULAR ONCE
Status: COMPLETED | OUTPATIENT
Start: 2024-07-03 | End: 2024-07-03

## 2024-07-03 RX ADMIN — IOPAMIDOL 57 ML: 755 INJECTION, SOLUTION INTRAVASCULAR at 15:02

## 2024-07-03 NOTE — DISCHARGE INSTRUCTIONS

## 2024-07-04 ENCOUNTER — MYC MEDICAL ADVICE (OUTPATIENT)
Dept: PULMONOLOGY | Facility: CLINIC | Age: 74
End: 2024-07-04
Payer: COMMERCIAL

## 2024-07-04 DIAGNOSIS — J44.9 CHRONIC OBSTRUCTIVE PULMONARY DISEASE, UNSPECIFIED COPD TYPE (H): Primary | ICD-10-CM

## 2024-07-05 ENCOUNTER — OFFICE VISIT (OUTPATIENT)
Dept: RADIATION ONCOLOGY | Facility: CLINIC | Age: 74
End: 2024-07-05
Payer: COMMERCIAL

## 2024-07-05 VITALS
BODY MASS INDEX: 21.02 KG/M2 | TEMPERATURE: 97.6 F | SYSTOLIC BLOOD PRESSURE: 111 MMHG | DIASTOLIC BLOOD PRESSURE: 58 MMHG | OXYGEN SATURATION: 97 % | WEIGHT: 114.9 LBS | HEART RATE: 99 BPM

## 2024-07-05 DIAGNOSIS — R91.8 PULMONARY NODULES: ICD-10-CM

## 2024-07-05 DIAGNOSIS — C34.12 PRIMARY MALIGNANT NEOPLASM OF LEFT UPPER LOBE OF LUNG (H): Primary | ICD-10-CM

## 2024-07-05 PROCEDURE — G2211 COMPLEX E/M VISIT ADD ON: HCPCS | Performed by: SURGERY

## 2024-07-05 PROCEDURE — 99214 OFFICE O/P EST MOD 30 MIN: CPT | Performed by: SURGERY

## 2024-07-05 ASSESSMENT — PAIN SCALES - GENERAL: PAINLEVEL: MILD PAIN (2)

## 2024-07-05 NOTE — LETTER
2024      Sienna Bennett  [address]  [place] [address]      Dear Colleague,    Thank you for referring your patient, Sienna Bennett, to the [facility] RADIATION ONCOLOGY [place]. Please see a copy of my visit note below.         Department of Radiation Oncology  [facility]: Cancer Center  [facility]  [address] [address] [place] [address] [phone]       Radiation Oncology Follow-up Visit  2024      Sienna Bennett  MRN: [id number]   : 1950        DIAGNOSIS / ID: Ms. Bennett is a 73 year old female with poor lung function presenting with left upper lobe nodule, most consistent with a [id number] malignancy. Plan for SBRT to [facility].       INTENT OF RADIOTHERAPY: Definitive     CONCURRENT SYSTEMIC THERAPY: No               SITE OF TREATMENT: [facility]     DATES  OF TREATMENT: - 9/15/23     TOTAL DOSE OF TREATMENT / FRACTIONS: 50Gy/5fx    INTERVAL SINCE COMPLETION OF RADIATION THERAPY: 10 months    SUBJECTIVE:     CT chest on 2023 demonstrates fairly stable disease, current measurement at 8.3 x 5.8 mm, previously 7.9 x 5.9mm in size.     CT Chest 5/10/24 demonstrates slight decrease/stable left apex nodule, measuring 7.2 mm in size, with associated post treatment related changes.  However, imaging also demonstrated a spiculated nodular opacity in the right upper lobe measuring up to 1.5 cm in size, which is new.  This may represent infection versus an additional primary, upon my review.  It is unlikely to be radiation pneumonitis, given that this is outside the radiation field.  At that time, she also endorsed recent URI symptoms, and thus a course of antibiotics and short interval CT scan was recommended    Today, she presents after a short interval CT scan which was performed on 7/3/2024.  This exam, upon my review, demonstrates stability of the left upper lobe nodule that was treated with SBRT with  associated postradiation changes.  The right upper lobe spiculated nodule has significantly improved in size and nodularity, making this less likely to be malignancy and most likely infectious/inflammatory.    Today, overall she states she is feeling improved but still has a cough and some mild phlegm, denies any chest pain, dyspnea.  She will continue to manage her COPD with Dr. Walker. She continue to see Dr. Lambert for essential thrombocytopenia.     PHYSICAL EXAM:  /58 (BP Location: Left arm, Patient Position: Sitting, Cuff Size: Adult Regular)   Pulse 99   Temp 97.6  F (36.4  C) (Oral)   Wt 52.1 kg (114 lb 14.4 oz)   SpO2 97%   BMI 21.02 kg/m    Gen: Alert, in NAD  Eyes: PERRL, EOMI, sclera anicteric  HENT     Head: NC/AT     Ears: No external auricular lesions     Nose/sinus: No rhinorrhea or epistaxis     Oral Cavity/Oropharynx: MMM  Neck: Supple, full ROM, no LAD  Pulm: No wheezing, stridor or respiratory distress  CV: Well-perfused, no cyanosis, no pedal edema  Abdominal: Soft, nontender, nondistended, no hepatomegaly  Back: No step-offs or pain to palpation along the thoracolumbar spine, no CVA tenderness  Rectal/: Deferred  Musculoskeletal: Normal bulk and tone   Skin: Normal color and turgor  Neurologic: A/Ox3, CN II-XII intact  Psychiatric: Appropriate mood and affect    LABS AND IMAGING:    Per HPI, reviewed and in agreement     PET/CT 8/9/23 (prior to SBRT)        CT 12/12/23       CT 5/10/24              7/3/24              I have reviewed Dr. Boogie and Dr Walker's notes     IMPRESSION: Overall, patient is doing well from a radiation acute toxicity perspective.  CT demonstrates stable disease, without any local or regional progression.  Further, right upper lobe nodularity has decreased in size/prominence, making this less likely related to malignancy and more likely infectious/inflammatory.       PLAN:   Follow up in 3 months with CT chest    Peter Gillis M.D.  Department of Radiation  Oncology  [facility]     A total of 40 minutes were spent on this visit, including preparation for this visit, face to face with patient, and medical decision making. Medical decision-making included consideration and possible diagnosis, management options, complex record review, review of diagnostic tests, consideration and discussion of significant complications based up medical history/comorbidities, and discussion with providers involved in care of the patient.       Again, thank you for allowing me to participate in the care of your patient.        Sincerely,        Peter Gillis MD

## 2024-07-05 NOTE — PROGRESS NOTES
Department of Radiation Oncology  [facility]    Health: Cancer Center  [facility]  [address] [place] [id number] [phone]       Radiation Oncology Follow-up Visit  2024      Sienna Bennett  MRN: [id number]   : 1950        DIAGNOSIS / ID: Ms. Bennett is a 73 year old female with poor lung function presenting with left upper lobe nodule, most consistent with a [id number] malignancy. Plan for SBRT to [facility].       INTENT OF RADIOTHERAPY: Definitive     CONCURRENT SYSTEMIC THERAPY: No               SITE OF TREATMENT: [facility]     DATES  OF TREATMENT: - 9/15/23     TOTAL DOSE OF TREATMENT / FRACTIONS: 50Gy/5fx    INTERVAL SINCE COMPLETION OF RADIATION THERAPY: 10 months    SUBJECTIVE:     CT chest on 2023 demonstrates fairly stable disease, current measurement at 8.3 x 5.8 mm, previously 7.9 x 5.9mm in size.     CT Chest 5/10/24 demonstrates slight decrease/stable left apex nodule, measuring 7.2 mm in size, with associated post treatment related changes.  However, imaging also demonstrated a spiculated nodular opacity in the right upper lobe measuring up to 1.5 cm in size, which is new.  This may represent infection versus an additional primary, upon my review.  It is unlikely to be radiation pneumonitis, given that this is outside the radiation field.  At that time, she also endorsed recent URI symptoms, and thus a course of antibiotics and short interval CT scan was recommended    Today, she presents after a short interval CT scan which was performed on 7/3/2024.  This exam, upon my review, demonstrates stability of the left upper lobe nodule that was treated with SBRT with associated postradiation changes.  The right upper lobe spiculated nodule has significantly improved in size and nodularity, making this less likely to be malignancy and most likely infectious/inflammatory.    Today, overall she states she is feeling improved  but still has a cough and some mild phlegm, denies any chest pain, dyspnea.  She will continue to manage her COPD with Dr. Walker. She continue to see Dr. Lambert for essential thrombocytopenia.     PHYSICAL EXAM:  /58 (BP Location: Left arm, Patient Position: Sitting, Cuff Size: Adult Regular)   Pulse 99   Temp 97.6  F (36.4  C) (Oral)   Wt 52.1 kg (114 lb 14.4 oz)   SpO2 97%   BMI 21.02 kg/m    Gen: Alert, in NAD  Eyes: PERRL, EOMI, sclera anicteric  HENT     Head: NC/AT     Ears: No external auricular lesions     Nose/sinus: No rhinorrhea or epistaxis     Oral Cavity/Oropharynx: MMM  Neck: Supple, full ROM, no LAD  Pulm: No wheezing, stridor or respiratory distress  CV: Well-perfused, no cyanosis, no pedal edema  Abdominal: Soft, nontender, nondistended, no hepatomegaly  Back: No step-offs or pain to palpation along the thoracolumbar spine, no CVA tenderness  Rectal/: Deferred  Musculoskeletal: Normal bulk and tone   Skin: Normal color and turgor  Neurologic: A/Ox3, CN II-XII intact  Psychiatric: Appropriate mood and affect    LABS AND IMAGING:    Per HPI, reviewed and in agreement     PET/CT 8/9/23 (prior to SBRT)        CT 12/12/23       CT 5/10/24              7/3/24              I have reviewed Dr. Boogie and Dr Walker's notes     IMPRESSION: Overall, patient is doing well from a radiation acute toxicity perspective.  CT demonstrates stable disease, without any local or regional progression.  Further, right upper lobe nodularity has decreased in size/prominence, making this less likely related to malignancy and more likely infectious/inflammatory.       PLAN:   Follow up in 3 months with CT chest    Peter Gillis M.D.  Department of Radiation Oncology  [facility]     A total of 40 minutes were spent on this visit, including preparation for this visit, face to face with patient, and medical decision making. Medical decision-making included consideration and possible diagnosis, management  options, complex record review, review of diagnostic tests, consideration and discussion of significant complications based up medical history/comorbidities, and discussion with providers involved in care of the patient.

## 2024-07-05 NOTE — PATIENT INSTRUCTIONS
Please contact [facility] RN with questions or concerns following today's appointment: [phone].       Please feel free to leave a detailed message if your call is not answered.    If your call is not received before 3:00 PM, it may not be returned until the following business day.    If you are receiving radiation treatment and need assistance after 3:00 PM or on the weekends, please call [phone] and ask to speak to the radiation oncologist on-call.    Thank you!    Li JAIME

## 2024-07-15 RX ORDER — AZITHROMYCIN 250 MG/1
TABLET, FILM COATED ORAL
Qty: 6 TABLET | Refills: 0 | Status: SHIPPED | OUTPATIENT
Start: 2024-07-15 | End: 2024-07-20

## 2024-07-15 RX ORDER — AZITHROMYCIN 250 MG/1
250 TABLET, FILM COATED ORAL
Qty: 36 TABLET | Refills: 3 | Status: SHIPPED | OUTPATIENT
Start: 2024-07-15 | End: 2025-07-10

## 2024-07-20 SDOH — HEALTH STABILITY: PHYSICAL HEALTH: ON AVERAGE, HOW MANY DAYS PER WEEK DO YOU ENGAGE IN MODERATE TO STRENUOUS EXERCISE (LIKE A BRISK WALK)?: 2 DAYS

## 2024-07-20 SDOH — HEALTH STABILITY: PHYSICAL HEALTH: ON AVERAGE, HOW MANY MINUTES DO YOU ENGAGE IN EXERCISE AT THIS LEVEL?: 20 MIN

## 2024-07-20 ASSESSMENT — SOCIAL DETERMINANTS OF HEALTH (SDOH): HOW OFTEN DO YOU GET TOGETHER WITH FRIENDS OR RELATIVES?: ONCE A WEEK

## 2024-07-25 ENCOUNTER — OFFICE VISIT (OUTPATIENT)
Dept: FAMILY MEDICINE | Facility: CLINIC | Age: 74
End: 2024-07-25
Attending: PHYSICIAN ASSISTANT
Payer: COMMERCIAL

## 2024-07-25 VITALS
WEIGHT: 113.19 LBS | RESPIRATION RATE: 16 BRPM | TEMPERATURE: 97.7 F | BODY MASS INDEX: 20.83 KG/M2 | OXYGEN SATURATION: 99 % | SYSTOLIC BLOOD PRESSURE: 120 MMHG | HEIGHT: 62 IN | DIASTOLIC BLOOD PRESSURE: 79 MMHG | HEART RATE: 89 BPM

## 2024-07-25 DIAGNOSIS — B00.9 HERPES SIMPLEX VIRUS INFECTION: ICD-10-CM

## 2024-07-25 DIAGNOSIS — R79.89 ABNORMAL TSH: ICD-10-CM

## 2024-07-25 DIAGNOSIS — Z23 NEED FOR SHINGLES VACCINE: ICD-10-CM

## 2024-07-25 DIAGNOSIS — M81.0 OSTEOPOROSIS, UNSPECIFIED OSTEOPOROSIS TYPE, UNSPECIFIED PATHOLOGICAL FRACTURE PRESENCE: ICD-10-CM

## 2024-07-25 DIAGNOSIS — Z00.00 ENCOUNTER FOR MEDICARE ANNUAL WELLNESS EXAM: Primary | ICD-10-CM

## 2024-07-25 DIAGNOSIS — J44.9 CHRONIC OBSTRUCTIVE PULMONARY DISEASE, UNSPECIFIED COPD TYPE (H): ICD-10-CM

## 2024-07-25 DIAGNOSIS — R94.6 ABNORMAL RESULTS OF THYROID FUNCTION STUDIES: ICD-10-CM

## 2024-07-25 DIAGNOSIS — R73.01 IMPAIRED FASTING GLUCOSE: ICD-10-CM

## 2024-07-25 DIAGNOSIS — Z23 NEED FOR TDAP VACCINATION: ICD-10-CM

## 2024-07-25 PROCEDURE — G0439 PPPS, SUBSEQ VISIT: HCPCS | Performed by: FAMILY MEDICINE

## 2024-07-25 PROCEDURE — 99213 OFFICE O/P EST LOW 20 MIN: CPT | Mod: 25 | Performed by: FAMILY MEDICINE

## 2024-07-25 RX ORDER — VITAMIN B COMPLEX
TABLET ORAL DAILY
COMMUNITY

## 2024-07-25 ASSESSMENT — PAIN SCALES - GENERAL: PAINLEVEL: NO PAIN (0)

## 2024-07-25 NOTE — PATIENT INSTRUCTIONS
Get Tdap (tetanus) and shingles vaccines with your pharmacy    Start powdered pyllium fiber such as Metamucil or Citrucel: start 1 tsp per day (mixed with fluid), and increase by 1 tsp per week to goal total dosing of 1-2 tablespoons per day. Drink plenty of water daily (at least 40-60 oz) for fiber to be effective.               Patient Education   Preventive Care Advice   This is general advice given by our system to help you stay healthy. However, your care team may have specific advice just for you. Please talk to your care team about your preventive care needs.  Nutrition  Eat 5 or more servings of fruits and vegetables each day.  Try wheat bread, brown rice and whole grain pasta (instead of white bread, rice, and pasta).  Get enough calcium and vitamin D. Check the label on foods and aim for 100% of the RDA (recommended daily allowance).  Lifestyle  Exercise at least 150 minutes each week  (30 minutes a day, 5 days a week).  Do muscle strengthening activities 2 days a week. These help control your weight and prevent disease.  No smoking.  Wear sunscreen to prevent skin cancer.  Have a dental exam and cleaning every 6 months.  Yearly exams  See your health care team every year to talk about:  Any changes in your health.  Any medicines your care team has prescribed.  Preventive care, family planning, and ways to prevent chronic diseases.  Shots (vaccines)   HPV shots (up to age 26), if you've never had them before.  Hepatitis B shots (up to age 59), if you've never had them before.  COVID-19 shot: Get this shot when it's due.  Flu shot: Get a flu shot every year.  Tetanus shot: Get a tetanus shot every 10 years.  Pneumococcal, hepatitis A, and RSV shots: Ask your care team if you need these based on your risk.  Shingles shot (for age 50 and up)  General health tests  Diabetes screening:  Starting at age 35, Get screened for diabetes at least every 3 years.  If you are younger than age 35, ask your care team if  you should be screened for diabetes.  Cholesterol test: At age 39, start having a cholesterol test every 5 years, or more often if advised.  Bone density scan (DEXA): At age 50, ask your care team if you should have this scan for osteoporosis (brittle bones).  Hepatitis C: Get tested at least once in your life.  STIs (sexually transmitted infections)  Before age 24: Ask your care team if you should be screened for STIs.  After age 24: Get screened for STIs if you're at risk. You are at risk for STIs (including HIV) if:  You are sexually active with more than one person.  You don't use condoms every time.  You or a partner was diagnosed with a sexually transmitted infection.  If you are at risk for HIV, ask about PrEP medicine to prevent HIV.  Get tested for HIV at least once in your life, whether you are at risk for HIV or not.  Cancer screening tests  Cervical cancer screening: If you have a cervix, begin getting regular cervical cancer screening tests starting at age 21.  Breast cancer scan (mammogram): If you've ever had breasts, begin having regular mammograms starting at age 40. This is a scan to check for breast cancer.  Colon cancer screening: It is important to start screening for colon cancer at age 45.  Have a colonoscopy test every 10 years (or more often if you're at risk) Or, ask your provider about stool tests like a FIT test every year or Cologuard test every 3 years.  To learn more about your testing options, visit:   .  For help making a decision, visit:   https://bit.ly/ns19743.  Prostate cancer screening test: If you have a prostate, ask your care team if a prostate cancer screening test (PSA) at age 55 is right for you.  Lung cancer screening: If you are a current or former smoker ages 50 to 80, ask your care team if ongoing lung cancer screenings are right for you.  For informational purposes only. Not to replace the advice of your health care provider. Copyright   2023 [employer].  All rights reserved. Clinically reviewed by the [facility] Transitions Program. [employer] [id number] - REV 01/24.  Hearing Loss: Care Instructions  Overview     Hearing loss is a sudden or slow decrease in how well you hear. It can range from slight to profound. Permanent hearing loss can occur with aging. It also can happen when you are exposed long-term to loud noise. Examples include listening to loud music, riding motorcycles, or being around other loud machines.  Hearing loss can affect your work and home life. It can make you feel lonely or depressed. You may feel that you have lost your independence. But hearing aids and other devices can help you hear better and feel connected to others.  Follow-up care is a key part of your treatment and safety. Be sure to make and go to all appointments, and call your doctor if you are having problems. It's also a good idea to know your test results and keep a list of the medicines you take.  How can you care for yourself at home?  Avoid loud noises whenever possible. This helps keep your hearing from getting worse.  Always wear hearing protection around loud noises.  Wear a hearing aid as directed.  A professional can help you pick a hearing aid that will work best for you.  You can also get hearing aids over the counter for mild to moderate hearing loss.  Have hearing tests as your doctor suggests. They can show whether your hearing has changed. Your hearing aid may need to be adjusted.  Use other devices as needed. These may include:  Telephone amplifiers and hearing aids that can connect to a television, stereo, radio, or microphone.  Devices that use lights or vibrations. These alert you to the doorbell, a ringing telephone, or a baby monitor.  Television closed-captioning. This shows the words at the bottom of the screen. Most new TVs can do this.  TTY (text telephone). This lets you type messages back and forth on the telephone instead of talking or listening.  "These devices are also called TDD. When messages are typed on the keyboard, they are sent over the phone line to a receiving TTY. The message is shown on a monitor.  Use text messaging, social media, and email if it is hard for you to communicate by telephone.  Try to learn a listening technique called speechreading. It is not lipreading. You pay attention to people's gestures, expressions, posture, and tone of voice. These clues can help you understand what a person is saying. Face the person you are talking to, and have them face you. Make sure the lighting is good. You need to see the other person's face clearly.  Think about counseling if you need help to adjust to your hearing loss.  When should you call for help?  Watch closely for changes in your health, and be sure to contact your doctor if:    You think your hearing is getting worse.     You have new symptoms, such as dizziness or nausea.   Where can you learn more?  Go to https://www.[employer].net/patiented  Enter R798 in the search box to learn more about \"Hearing Loss: Care Instructions.\"  Current as of: September 27, 2023               Content Version: 14.0    1871-6045 [employer].   Care instructions adapted under license by your healthcare professional. If you have questions about a medical condition or this instruction, always ask your healthcare professional. [employer] disclaims any warranty or liability for your use of this information.         "

## 2024-07-25 NOTE — PROGRESS NOTES
Preventive Care Visit  [facility]  Kalli Merary Valle MD, Family Medicine  Jul 25, 2024      Assessment & Plan     Encounter for Medicare annual wellness exam  Discussed fiber supplementation for constipation management    Impaired fasting glucose  Monitoring labs.  Reviewed healthy diet, exercise  - Hemoglobin A1c; Future    Abnormal TSH  History of.  Will recheck labs  - TSH with free T4 reflex; Future    Chronic obstructive pulmonary disease, unspecified COPD type (H)  Is now on Trelegy.  Follows with pulmonary and on chronic antibiotics given CT scan changes.  Has completed lung cancer radiation treatment and be monitored closely for recurrence    Osteoporosis, unspecified osteoporosis type, unspecified pathological fracture presence  Lumbar fracture this last year.  Is now working with endocrinology and currently on Forteo  - TSH with free T4 reflex; Future    Need for Tdap vaccination  Need for shingles vaccine  Will get vaccines with her pharmacy    Herpes simplex virus infection  On daily valacyclovir            Counseling  Appropriate preventive services were addressed with this patient via screening, questionnaire, or discussion as appropriate for fall prevention, nutrition, physical activity, Tobacco-use cessation, weight loss and cognition.  Checklist reviewing preventive services available has been given to the patient.  Reviewed patient's diet, addressing concerns and/or questions.   She is at risk for lack of exercise and has been provided with information to increase physical activity for the benefit of her well-being.   She is at risk for psychosocial distress and has been provided with information to reduce risk.   The patient was provided with written information regarding signs of hearing loss.           Subjective   Kelsea is a 73 year old, presenting for the following:  Physical    - finished radiation for lung nodule/cancer. Tolerating radiation. Following with  radiation oncology and pulmonary as several changes on CT. Next CT planned in Oct.   On azithro for one year, just started along with trelegy and mucinex and prn xopenex    [place] cruise for 5 mo this last Jan.   Still active. Stairs will wind her temporarily    Start forteo for bone health- following with endocrinology. Thyroid also checked there. Compression fracture this last year.     Eat well. Needs to exercise.     Occasional constipation        7/25/2024     8:12 AM   Additional Questions   Roomed by Diane Lynne Schoenherr RN         Health Care Directive  Patient does not have a Health Care Directive or Living Will: Discussed advance care planning with patient; however, patient declined at this time.    HPI    Moving to [place], [place] this spring to be by daughter.     Wants to wait for new covid 19 vaccine. Limited exposure right now        7/20/2024   General Health   How would you rate your overall physical health? Good   Feel stress (tense, anxious, or unable to sleep) Only a little      (!) STRESS CONCERN      7/20/2024   Nutrition   Diet: Regular (no restrictions)            7/20/2024   Exercise   Days per week of moderate/strenous exercise 2 days   Average minutes spent exercising at this level 20 min      (!) EXERCISE CONCERN      7/20/2024   Social Factors   Frequency of gathering with friends or relatives Once a week   Worry food won't last until get money to buy more No   Food not last or not have enough money for food? No   Do you have housing? (Housing is defined as stable permanent housing and does not include staying ouside in a car, in a tent, in an abandoned building, in an overnight shelter, or couch-surfing.) Yes   Are you worried about losing your housing? No   Lack of transportation? No   Unable to get utilities (heat,electricity)? No            7/20/2024   Fall Risk   Fallen 2 or more times in the past year? No   Trouble with walking or balance? No             7/20/2024    Activities of Daily Living- Home Safety   Needs help with the following daily activites None of the above   Safety concerns in the home None of the above            2024   Dental   Dentist two times every year? Yes            2024   Hearing Screening   Hearing concerns? (!) I NEED TO ASK PEOPLE TO SPEAK UP OR REPEAT THEMSELVES.            2024   Driving Risk Screening   Patient/family members have concerns about driving No            2024   General Alertness/Fatigue Screening   Have you been more tired than usual lately? No            2024   Urinary Incontinence Screening   Bothered by leaking urine in past 6 months No            2024   TB Screening   Were you born outside of the [place]? No            Today's PHQ-2 Score:       2024     7:59 AM   PHQ-2 (  [employer])   Q1: Little interest or pleasure in doing things 0   Q2: Feeling down, depressed or hopeless 0   PHQ-2 Score 0   Q1: Little interest or pleasure in doing things Not at all   Q2: Feeling down, depressed or hopeless Not at all   PHQ-2 Score 0           2024   Substance Use   Alcohol more than 3/day or more than 7/wk No   Do you have a current opioid prescription? No   How severe/bad is pain from 1 to 10? 3/10   Do you use any other substances recreationally? No        Social History     Tobacco Use    Smoking status: Former     Current packs/day: 0.00     Average packs/day: 1.5 packs/day for 45.0 years (67.5 ttl pk-yrs)     Types: Cigarettes     Start date: 1968     Quit date: 2013     Years since quittin.5    Smokeless tobacco: Never   Vaping Use    Vaping status: Never Used   Substance Use Topics    Alcohol use: Yes     Comment: rare social use - 0-2 x's month    Drug use: No           2024   LAST FHS-7 RESULTS   1st degree relative breast or ovarian cancer No   Any relative bilateral breast cancer No   Any male have breast cancer No   Any ONE woman have BOTH breast AND ovarian cancer No   Any  woman with breast cancer before 50yrs No   2 or more relatives with breast AND/OR ovarian cancer No   2 or more relatives with breast AND/OR bowel cancer No               ASCVD Risk   The 10-year ASCVD risk score (Heath MANCERA, et al., 2019) is: 8.7%    Values used to calculate the score:      Age: 73 years      Sex: Female      Is Non- : No      Diabetic: No      Tobacco smoker: No      Systolic Blood Pressure: 120 mmHg      Is BP treated: No      HDL Cholesterol: 53 mg/dL      Total Cholesterol: 148 mg/dL            Reviewed and updated as needed this visit by Provider   Tobacco   Meds                  Current providers sharing in care for this patient include:  Patient Care Team:  Kalli Valle MD as PCP - General (Family Medicine)  Unassigned Eve Alas MD Bloom, Stuart Hunegs, MD as MD (Hematology & Oncology)  Kalli Valle MD as Referring Physician (Family Medicine)  Cami Villatoro MD as MD (Dermatology)  Bogdan Crespo MD as MD (Cardiovascular Disease)  Amber Baumann PA-C as Physician Assistant (Dermatology)  Kerri Stevenson RN as Specialty Care Coordinator (Hematology & Oncology)  Gilda Walker MD as Assigned Pulmonology Provider  Amber Baumann PA-C as Assigned Surgical Provider  David Arias MD as MD (Cardiovascular & Thoracic Surgery)  Peter Gillis MD as MD (Radiation Oncology)  Chinmay Moscoso MD as Fellow (Endocrinology, Diabetes, and Metabolism)  Solitario Ortiz MD as MD (Occupational Medicine)  Chinmay Moscoso MD as Fellow (Endocrinology, Diabetes, and Metabolism)  Peter Gillis MD as Assigned Cancer Care Provider  Kalli Valle MD as Assigned PCP  Marleen Glez PA-C as Assigned Heart and Vascular Provider    The following health maintenance items are reviewed in Epic and correct as of today:  Health Maintenance   Topic Date Due    COPD ACTION PLAN  Never done    ZOSTER IMMUNIZATION  "(1 of 2) Never done    DTAP/TDAP/TD IMMUNIZATION (1 - Tdap) Never done    ANNUAL REVIEW OF HM ORDERS  07/20/2023    COVID-19 Vaccine (7 - 2023-24 season) 02/06/2024    INFLUENZA VACCINE (1) 09/01/2024    LIPID  05/21/2025    MEDICARE ANNUAL WELLNESS VISIT  07/25/2025    FALL RISK ASSESSMENT  07/25/2025    COLORECTAL CANCER SCREENING  08/31/2025    MAMMO SCREENING  05/08/2026    GLUCOSE  05/23/2027    ADVANCE CARE PLANNING  07/25/2029    DEXA  11/13/2038    SPIROMETRY  Completed    HEPATITIS C SCREENING  Completed    PHQ-2 (once per calendar year)  Completed    Pneumococcal Vaccine: 65+ Years  Completed    RSV VACCINE (Pregnancy & 60+)  Completed    IPV IMMUNIZATION  Aged Out    HPV IMMUNIZATION  Aged Out    MENINGITIS IMMUNIZATION  Aged Out    RSV MONOCLONAL ANTIBODY  Aged Out    LUNG CANCER SCREENING  Discontinued         Review of Systems  Constitutional, neuro, ENT, endocrine, pulmonary, cardiac, gastrointestinal, genitourinary, musculoskeletal, integument and psychiatric systems are negative, except as otherwise noted.     Objective    Exam  /79 (BP Location: Right arm, Patient Position: Sitting, Cuff Size: Adult Regular)   Pulse 89   Temp 97.7  F (36.5  C) (Oral)   Resp 16   Ht 1.585 m (5' 2.4\")   Wt 51.3 kg (113 lb 3 oz)   SpO2 99%   BMI 20.44 kg/m     Estimated body mass index is 20.44 kg/m  as calculated from the following:    Height as of this encounter: 1.585 m (5' 2.4\").    Weight as of this encounter: 51.3 kg (113 lb 3 oz).    Physical Exam  GENERAL: alert and no distress  EYES: Eyes grossly normal to inspection, PERRL and conjunctivae and sclerae normal  HENT: ear canals and TM's normal, nose and mouth without ulcers or lesions  NECK: no adenopathy, no asymmetry, masses, or scars  RESP: lungs clear to auscultation - no rales, rhonchi or wheezes  BREAST: normal without masses, tenderness or nipple discharge and no palpable axillary masses or adenopathy  CV: regular rate and rhythm, normal S1 " S2, no S3 or S4, no murmur, click or rub, no peripheral edema  ABDOMEN: soft, nontender, no hepatosplenomegaly, no masses and bowel sounds normal  MS: no gross musculoskeletal defects noted, no edema  SKIN: no suspicious lesions or rashes  NEURO: Normal strength and tone, mentation intact and speech normal  PSYCH: mentation appears normal, affect normal/bright         No data to display                       Signed Electronically by: Kalli Valle MD

## 2024-11-07 ENCOUNTER — ANCILLARY PROCEDURE (OUTPATIENT)
Dept: CT IMAGING | Facility: CLINIC | Age: 74
End: 2024-11-07
Attending: SURGERY
Payer: COMMERCIAL

## 2024-11-07 DIAGNOSIS — C34.12 PRIMARY MALIGNANT NEOPLASM OF LEFT UPPER LOBE OF LUNG (H): ICD-10-CM

## 2024-11-07 DIAGNOSIS — R91.8 PULMONARY NODULES: ICD-10-CM

## 2024-11-07 PROCEDURE — 71250 CT THORAX DX C-: CPT | Performed by: RADIOLOGY

## 2024-11-12 ENCOUNTER — OFFICE VISIT (OUTPATIENT)
Dept: RADIATION ONCOLOGY | Facility: CLINIC | Age: 74
End: 2024-11-12
Payer: COMMERCIAL

## 2024-11-12 VITALS
OXYGEN SATURATION: 97 % | TEMPERATURE: 97.5 F | HEART RATE: 100 BPM | DIASTOLIC BLOOD PRESSURE: 77 MMHG | RESPIRATION RATE: 16 BRPM | WEIGHT: 115.5 LBS | BODY MASS INDEX: 20.85 KG/M2 | SYSTOLIC BLOOD PRESSURE: 117 MMHG

## 2024-11-12 DIAGNOSIS — C34.12 PRIMARY MALIGNANT NEOPLASM OF LEFT UPPER LOBE OF LUNG (H): Primary | ICD-10-CM

## 2024-11-12 PROCEDURE — 99214 OFFICE O/P EST MOD 30 MIN: CPT | Performed by: SURGERY

## 2024-11-12 PROCEDURE — G2211 COMPLEX E/M VISIT ADD ON: HCPCS | Performed by: SURGERY

## 2024-11-12 ASSESSMENT — PAIN SCALES - GENERAL: PAINLEVEL_OUTOF10: NO PAIN (0)

## 2024-11-12 NOTE — PATIENT INSTRUCTIONS
Please contact [facility] RN with questions or concerns following today's appointment.    If you are a patient of Dr. Gillis call: [phone]   If you are a patient of Dr. Myers call: [phone]    Please feel free to leave a detailed message if your call is not answered.    If your call is not received before 3:00 PM, it may not be returned until the following business day.    If you are receiving radiation treatment and need assistance after 3:00 PM or on the weekends, please call [phone] and ask to speak to the radiation oncologist on-call.    Thank you!    [facility] Nursing

## 2024-11-12 NOTE — PROGRESS NOTES
Department of Radiation Oncology  [facility]    Health: Cancer Center  [facility]  [address] [place] [id number] [phone]       Radiation Oncology Follow-up Visit  2024      Sienna Bennett  MRN: [id number]   : 1950        DIAGNOSIS / ID: Ms. Bennett is a 74 year old female with poor lung function presenting with left upper lobe nodule, most consistent with a [id number] malignancy. Plan for SBRT to [facility].       INTENT OF RADIOTHERAPY: Definitive     CONCURRENT SYSTEMIC THERAPY: No               SITE OF TREATMENT: [facility]     DATES  OF TREATMENT: - 9/15/23     TOTAL DOSE OF TREATMENT / FRACTIONS: 50Gy/5fx    INTERVAL SINCE COMPLETION OF RADIATION THERAPY: 14 months    SUBJECTIVE:     CT chest on 2023 demonstrates fairly stable disease, current measurement at 8.3 x 5.8 mm, previously 7.9 x 5.9mm in size.     CT Chest 5/10/24 demonstrates slight decrease/stable left apex nodule, measuring 7.2 mm in size, with associated post treatment related changes.  However, imaging also demonstrated a spiculated nodular opacity in the right upper lobe measuring up to 1.5 cm in size, which is new.  This may represent infection versus an additional primary, upon my review.  It is unlikely to be radiation pneumonitis, given that this is outside the radiation field.  At that time, she also endorsed recent URI symptoms, and thus a course of antibiotics and short interval CT scan was recommended    Today, she presents after a short interval CT scan which was performed on 7/3/2024.  This exam, upon my review, demonstrates stability of the left upper lobe nodule that was treated with SBRT with associated postradiation changes.  The right upper lobe spiculated nodule has significantly improved in size and nodularity, making this less likely to be malignancy and most likely infectious/inflammatory.    Interval history:    CT scan was performed on  11/7/2024, which demonstrated stable postradiation changes/scarring left upper lobe treated location.  There is resolution of the right upper lobe groundglass opacity, no evidence of any local, regional, distant recurrence.    Overall, patient is feeling quite well.  She denies any active chest pain, dyspnea, hemoptysis, focal neurologic changes.  She still is managed by Dr. Walker for her COPD and will see Dr. Lambert for essential thrombocytopenia.    PHYSICAL EXAM:  /77 (BP Location: Left arm, Patient Position: Sitting, Cuff Size: Adult Regular)   Pulse 100   Temp 97.5  F (36.4  C) (Oral)   Resp 16   Wt 52.4 kg (115 lb 8 oz)   SpO2 97%   BMI 20.85 kg/m    Gen: Alert, in NAD  Eyes: PERRL, EOMI, sclera anicteric  HENT     Head: NC/AT     Ears: No external auricular lesions     Nose/sinus: No rhinorrhea or epistaxis     Oral Cavity/Oropharynx: MMM  Neck: Supple, full ROM, no LAD  Pulm: No wheezing, stridor or respiratory distress  CV: Well-perfused, no cyanosis, no pedal edema  Abdominal: Soft, nontender, nondistended, no hepatomegaly  Back: No step-offs or pain to palpation along the thoracolumbar spine, no CVA tenderness  Rectal/: Deferred  Musculoskeletal: Normal bulk and tone   Skin: Normal color and turgor  Neurologic: A/Ox3, CN II-XII intact  Psychiatric: Appropriate mood and affect    LABS AND IMAGING:    Per HPI, reviewed and in agreement     PET/CT 8/9/23 (prior to SBRT)        CT 12/12/23       CT 5/10/24              7/3/24            CT 11/7/24              I have reviewed Dr. Boogie and Dr Walker's notes     IMPRESSION: Overall, patient is doing well from a radiation acute toxicity perspective.  CT demonstrates postradiation/stable disease of the left upper lobe, without any local or regional progression.  Further, right upper lobe nodularity has decreased in size/prominence, making this less likely related to malignancy and more likely infectious/inflammatory.       PLAN:   Follow up in 3  months with CT chest with Dr. Myers, given that I will be relocating for another job at the end of the year.    Peter Gillis M.D.  Department of Radiation Oncology  [facility]     A total of 30 minutes were spent on this visit, including preparation for this visit, face to face with patient, and medical decision making. Medical decision-making included consideration and possible diagnosis, management options, complex record review, review of diagnostic tests, consideration and discussion of significant complications based up medical history/comorbidities, and discussion with providers involved in care of the patient.

## 2024-11-12 NOTE — NURSING NOTE
"Oncology Rooming Note    November 12, 2024 12:04 PM   Sienna Benentt is a 74 year old female who presents for:    Chief Complaint   Patient presents with    Cancer     Radiation Oncology Return Visit with Dr. Gillis     Initial Vitals: /77 (BP Location: Left arm, Patient Position: Sitting, Cuff Size: Adult Regular)   Pulse 100   Temp 97.5  F (36.4  C) (Oral)   Resp 16   Wt 52.4 kg (115 lb 8 oz)   SpO2 97%   BMI 20.85 kg/m   Estimated body mass index is 20.85 kg/m  as calculated from the following:    Height as of 7/25/24: 1.585 m (5' 2.4\").    Weight as of this encounter: 52.4 kg (115 lb 8 oz). Body surface area is 1.52 meters squared.  No Pain (0) Comment: Data Unavailable   No LMP recorded. Patient is postmenopausal.  Allergies reviewed: Yes  Medications reviewed: Yes    Medications: Medication refills not needed today.  Pharmacy name entered into [facility]: [id number] [place] [address]    Frailty Screening:   Is the patient here for a new oncology consult visit in cancer care? 2. No      Clinical concerns: Radiation Oncology Return Visit  Dr. Gillis was notified.      Anali Esqueda             "

## 2024-11-12 NOTE — LETTER
2024      Sienna Bennett  [address]  [place] [address]      Dear Colleague,    Thank you for referring your patient, Sienna Bennett, to the [facility] RADIATION ONCOLOGY [place]. Please see a copy of my visit note below.         Department of Radiation Oncology  [facility]  [facility]  [address] [address] [place] [address] [phone]       Radiation Oncology Follow-up Visit  2024      Sienna Bennett  MRN: [id number]   : 1950        DIAGNOSIS / ID: Ms. Bennett is a 74 year old female with poor lung function presenting with left upper lobe nodule, most consistent with a [id number] malignancy. Plan for SBRT to [facility].       INTENT OF RADIOTHERAPY: Definitive     CONCURRENT SYSTEMIC THERAPY: No               SITE OF TREATMENT: [facility]     DATES  OF TREATMENT: - 9/15/23     TOTAL DOSE OF TREATMENT / FRACTIONS: 50Gy/5fx    INTERVAL SINCE COMPLETION OF RADIATION THERAPY: 14 months    SUBJECTIVE:     CT chest on 2023 demonstrates fairly stable disease, current measurement at 8.3 x 5.8 mm, previously 7.9 x 5.9mm in size.     CT Chest 5/10/24 demonstrates slight decrease/stable left apex nodule, measuring 7.2 mm in size, with associated post treatment related changes.  However, imaging also demonstrated a spiculated nodular opacity in the right upper lobe measuring up to 1.5 cm in size, which is new.  This may represent infection versus an additional primary, upon my review.  It is unlikely to be radiation pneumonitis, given that this is outside the radiation field.  At that time, she also endorsed recent URI symptoms, and thus a course of antibiotics and short interval CT scan was recommended    Today, she presents after a short interval CT scan which was performed on 7/3/2024.  This exam, upon my review, demonstrates stability of the left upper lobe nodule that was treated with SBRT  with associated postradiation changes.  The right upper lobe spiculated nodule has significantly improved in size and nodularity, making this less likely to be malignancy and most likely infectious/inflammatory.    Interval history:    CT scan was performed on 11/7/2024, which demonstrated stable postradiation changes/scarring left upper lobe treated location.  There is resolution of the right upper lobe groundglass opacity, no evidence of any local, regional, distant recurrence.    Overall, patient is feeling quite well.  She denies any active chest pain, dyspnea, hemoptysis, focal neurologic changes.  She still is managed by Dr. Walker for her COPD and will see Dr. Lambert for essential thrombocytopenia.    PHYSICAL EXAM:  /77 (BP Location: Left arm, Patient Position: Sitting, Cuff Size: Adult Regular)   Pulse 100   Temp 97.5  F (36.4  C) (Oral)   Resp 16   Wt 52.4 kg (115 lb 8 oz)   SpO2 97%   BMI 20.85 kg/m    Gen: Alert, in NAD  Eyes: PERRL, EOMI, sclera anicteric  HENT     Head: NC/AT     Ears: No external auricular lesions     Nose/sinus: No rhinorrhea or epistaxis     Oral Cavity/Oropharynx: MMM  Neck: Supple, full ROM, no LAD  Pulm: No wheezing, stridor or respiratory distress  CV: Well-perfused, no cyanosis, no pedal edema  Abdominal: Soft, nontender, nondistended, no hepatomegaly  Back: No step-offs or pain to palpation along the thoracolumbar spine, no CVA tenderness  Rectal/: Deferred  Musculoskeletal: Normal bulk and tone   Skin: Normal color and turgor  Neurologic: A/Ox3, CN II-XII intact  Psychiatric: Appropriate mood and affect    LABS AND IMAGING:    Per HPI, reviewed and in agreement     PET/CT 8/9/23 (prior to SBRT)        CT 12/12/23       CT 5/10/24              7/3/24            CT 11/7/24              I have reviewed Dr. Boogie and Dr Walker's notes     IMPRESSION: Overall, patient is doing well from a radiation acute toxicity perspective.  CT demonstrates postradiation/stable disease  of the left upper lobe, without any local or regional progression.  Further, right upper lobe nodularity has decreased in size/prominence, making this less likely related to malignancy and more likely infectious/inflammatory.       PLAN:   Follow up in 3 months with CT chest with Dr. Myers, given that I will be relocating for another job at the end of the year.    Peter Gillis M.D.  Department of Radiation Oncology  [facility]     A total of 30 minutes were spent on this visit, including preparation for this visit, face to face with patient, and medical decision making. Medical decision-making included consideration and possible diagnosis, management options, complex record review, review of diagnostic tests, consideration and discussion of significant complications based up medical history/comorbidities, and discussion with providers involved in care of the patient.       Again, thank you for allowing me to participate in the care of your patient.        Sincerely,        Peter Gillis MD

## 2024-11-18 ENCOUNTER — LAB (OUTPATIENT)
Dept: LAB | Facility: CLINIC | Age: 74
End: 2024-11-18
Attending: INTERNAL MEDICINE
Payer: COMMERCIAL

## 2024-11-18 DIAGNOSIS — D47.3 ESSENTIAL THROMBOCYTHEMIA (H): ICD-10-CM

## 2024-11-18 LAB
BASOPHILS # BLD AUTO: 0 10E3/UL (ref 0–0.2)
BASOPHILS NFR BLD AUTO: 0 %
EOSINOPHIL # BLD AUTO: 0.1 10E3/UL (ref 0–0.7)
EOSINOPHIL NFR BLD AUTO: 2 %
ERYTHROCYTE [DISTWIDTH] IN BLOOD BY AUTOMATED COUNT: 12.7 % (ref 10–15)
HCT VFR BLD AUTO: 40.3 % (ref 35–47)
HGB BLD-MCNC: 13.6 G/DL (ref 11.7–15.7)
IMM GRANULOCYTES # BLD: 0 10E3/UL
IMM GRANULOCYTES NFR BLD: 0 %
LYMPHOCYTES # BLD AUTO: 0.9 10E3/UL (ref 0.8–5.3)
LYMPHOCYTES NFR BLD AUTO: 20 %
MCH RBC QN AUTO: 40.1 PG (ref 26.5–33)
MCHC RBC AUTO-ENTMCNC: 33.7 G/DL (ref 31.5–36.5)
MCV RBC AUTO: 119 FL (ref 78–100)
MONOCYTES # BLD AUTO: 0.3 10E3/UL (ref 0–1.3)
MONOCYTES NFR BLD AUTO: 7 %
NEUTROPHILS # BLD AUTO: 3.4 10E3/UL (ref 1.6–8.3)
NEUTROPHILS NFR BLD AUTO: 72 %
NRBC # BLD AUTO: 0 10E3/UL
NRBC BLD AUTO-RTO: 0 /100
PLATELET # BLD AUTO: 360 10E3/UL (ref 150–450)
RBC # BLD AUTO: 3.39 10E6/UL (ref 3.8–5.2)
WBC # BLD AUTO: 4.7 10E3/UL (ref 4–11)

## 2024-11-18 PROCEDURE — 36415 COLL VENOUS BLD VENIPUNCTURE: CPT

## 2024-11-18 PROCEDURE — 85025 COMPLETE CBC W/AUTO DIFF WBC: CPT

## 2024-11-24 NOTE — PROGRESS NOTES
Virtual Visit Details    Type of service:  Video Visit   Video Start Time:  10:35  Video End Time:10:57 AM    Originating Location (pt. Location): Home    Distant Location (provider location):  On-site  Platform used for Video Visit: [facility] Hematology Note  Date of visit: November 25, 2024  Outpatient Clinic Note        Assessment:     Essential thrombocythemia, approavching 22 years from the time of diagnosis, with stable platelet count on current dosing of hydroxyurea, 1000 mg p.o. Saturdays and Sundays, with 500 mg p.o. daily the rest of the week.   The chance of this becoming a major issue for her is extraordinarily low, though not zero.  There is a one in 20 chance of progressing to either myelofibrosis or an acute leukemia.  This is why I have continued  to see Kelsea and its been a pleasure to do so. How meaningful for me to have been her doctor for the last over 20 years!  As has always been my concern, the major healthcare issue is related to her heavy smoking history. She has been struggling with worsening COPD.  She did develop a small (~1cm) lung cancer, but she was medically inoperable because of the COPD.  She is status post SBRT to that lesion.    Multiple other psychosocial stressors discussed at length.   She will be moving to [place] soon. I recommend following up at the [facility] for both her ET and lung cancer      Plan:       Continue current dosing of hydroxyurea  Continue current dosing of aspirin  Continue follow-up with Dr. Valle  I gave Kelsea my cell phone number today. If her new doctor needs any input, I am always happy to provide that!      Sanjay Lambert MD, MSc  Associate Professor of Medicine  [employer]  [facility]  [address] [place] [address]  [phone]    __________________________________________________________________    DIAGNOSES     Essential thrombocythemia, diagnosed by bone  marrow biopsy on 04/10/2001.  Kelsea has low risk features but has a significantly increased risk of thrombosis because of her long-term history of smoking.  She finally quit 6 years ago  Clinical Stage I ([id number]) lung malignancy. Kelsea was felt to be medically inoperable due to poor lung function. She is s/p curative intent SBRT, completed 9/13/2023. Most recent CT 11/2024 demonstrates postradiation/stable disease of the left upper lobe, without any local or regional progression.   COPD    History of Present Illness:     Hydroxyurea at varying doses.  She has been on this for 20 years.  Her current dose is 1000 mg p.o. daily on Saturdays and Sundays, with 500 mg daily the rest of the week  She also remains on a daily aspirin.      Interval history:     Kelsea is back for her visit  Saw Dr. Gillis of Rad Onc 11/12/2024. No evidence of progression. Follow-up in 3 months.  Moving to [place] in a few months. Worried about the care she will receive there.  Gravelly voice.  Feels more frail.  Feels good, though.  Normal aches and pains.  Does not get out very much.  The weather kind of affects her breathing.  No new lumps or bumps  No bleeding or bruising  No new swelling          Past Medical History:   I have reviewed this patient's past medical history   Past Medical History:   Diagnosis Date    COPD (chronic obstructive pulmonary disease) (H) 2014    Essential thrombocythemia (H) 07/20/2022    Heart disease 2022    Lung mass 04/03/2023    S/P radiation therapy     5,000 cGy to [facility] Lung (SBRT) completed on 9/15/2023 - [facility]          Past Surgical History:    I have reviewed this patient's past surgical history       Social History:   Tobacco, ETOH, and rec drugs reviewed and as noted below with the following exceptions:  Kelsea is  to Jason who is 15 years her senior, but has no biologic children.  She is originally from [place].  She has been traveling back and forth to [place] because her  93-year-old mother, who most recently lived with her and her  Jason, wanted to see family.  Kelsea's brothers think of her as a Saint.  She wanted them to share some of the burden of their mother's care.  Her mother finally passed away at Kelsea's house about a year ago (mid ).          Family History:     Family History   Problem Relation Age of Onset    Hypertension Mother     Hyperlipidemia Mother     Heart Failure Mother          age 97    Cerebrovascular Disease Father         At age 86    Prostate Cancer Father     Hypertension Brother     Bladder Cancer Brother     Lung Cancer Brother     Lymphoma Brother             Medications:     Current Outpatient Medications   Medication Sig Dispense Refill    aspirin 81 MG EC tablet 81 mg daily      azithromycin (ZITHROMAX) 250 MG tablet Take 1 tablet (250 mg) by mouth Every Mon, Wed, Fri Morning for 360 days 36 tablet 3    calcium carbonate-vitamin D (CALTRATE) 600-10 MG-MCG per tablet Take 1 tablet by mouth 2 times daily      fluticasone (FLONASE) 50 MCG/ACT nasal spray INSTILL 2 SPRAYS INTO BOTH NOSTRILS DAILY. 48 mL 2    Fluticasone-Umeclidin-Vilanterol (TRELEGY ELLIPTA) 200-62.5-25 MCG/ACT oral inhaler Inhale 1 puff into the lungs daily 3 each 3    guaiFENesin (MUCINEX) 600 MG 12 hr tablet Take 600-1,200 mg by mouth 2 times daily      hydroxyurea (HYDREA) 500 MG capsule Take 1 capsule (500 mg) by mouth daily 5 days/week with 1000 mg the other 2 days 225 capsule 4    levalbuterol (XOPENEX HFA) 45 MCG/ACT inhaler Inhale 2 puffs into the lungs every 4 hours as needed for shortness of breath or wheezing 15 g 1    Probiotic Product (PROBIOTIC DAILY PO) Take by mouth daily      rosuvastatin (CRESTOR) 20 MG tablet Take 1 tablet (20 mg) by mouth daily 90 tablet 1    teriparatide, recombinant, (FORTEO) 600 MCG/2.4ML SOPN injection       valACYclovir (VALTREX) 500 MG tablet Take 1 tablet (500 mg) by mouth daily 90 tablet 3    Vitamin D3 (VITAMIN D, CHOLECALCIFEROL,)  25 mcg (1000 units) tablet Take by mouth daily                Physical Exam:   There were no vitals taken for this visit.    ECOG PS: 0  Constitutional: WDWN female in NAD, pleasant and appropriate  Neurologic: alert, answering questions appropriately, moving all extremities spontaneously. CN 2-12 grossly intact.  Psych: appropriate affect  No further exam could be done because this was a virtual visit  Data:      Latest Reference Range & Units 07/13/23 10:19 10/11/23 13:16 05/23/24 11:17 11/18/24 10:30   WBC 4.0 - 11.0 10e3/uL 4.1 4.3 3.9 (L) 4.7   Hemoglobin 11.7 - 15.7 g/dL 13.6 13.5 13.7 13.6   Hematocrit 35.0 - 47.0 % 41.5 39.2 41.1 40.3   Platelet Count 150 - 450 10e3/uL 332 378 472 (H) 360   RBC Count 3.80 - 5.20 10e6/uL 3.60 (L) 3.40 (L) 3.69 (L) 3.39 (L)   MCV 78 - 100 fL 115 (H) 115 (H) 111 (H) 119 (H)   MCH 26.5 - 33.0 pg 37.8 (H) 39.7 (H) 37.1 (H) 40.1 (H)   MCHC 31.5 - 36.5 g/dL 32.8 34.4 33.3 33.7   RDW 10.0 - 15.0 % 12.8 13.5 12.4 12.7   % Neutrophils % 68  68 72   (L): Data is abnormally low  (H): Data is abnormally high        No results found for this or any previous visit (from the past 24 hours).    Other Data     CT Chest:  1. Continued increase in left upper lobe spiculated solid nodule now  measuring 8 mm is considered lung rads 4B. Consider contrast CT and/or  biopsy.    Labs, imaging and treatment plan reviewed with patient. All questions answered.        30 minutes spent on the date of the encounter doing chart review, review of outside records, review of test results, interpretation of tests, patient visit and documentation

## 2024-11-25 ENCOUNTER — VIRTUAL VISIT (OUTPATIENT)
Dept: ONCOLOGY | Facility: CLINIC | Age: 74
End: 2024-11-25
Attending: INTERNAL MEDICINE
Payer: COMMERCIAL

## 2024-11-25 DIAGNOSIS — D47.3 ESSENTIAL THROMBOCYTHEMIA (H): Primary | ICD-10-CM

## 2024-11-25 PROCEDURE — 99214 OFFICE O/P EST MOD 30 MIN: CPT | Mod: 95 | Performed by: INTERNAL MEDICINE

## 2024-11-25 NOTE — NURSING NOTE
Current patient location: [address] [facility] [address]    Is the patient currently in the state of [place]? YES    Visit mode:VIDEO    If the visit is dropped, the patient can be reconnected by:VIDEO VISIT: Text to cell phone:   Telephone Information:   Mobile [phone]       Will anyone else be joining the visit? NO  (If patient encounters technical issues they should call [phone]    Are changes needed to the allergy or medication list? Pt stated no changes to allergies and Pt stated no med changes    Are refills needed on medications prescribed by this physician? NO    Rooming Documentation:  Not applicable    Reason for visit: RECHECK    Richi DIAMOND

## 2024-11-25 NOTE — LETTER
11/25/2024      Sienna Bennett  [address]  [place] [address]      Dear Colleague,    Thank you for referring your patient, Sienna Bennett, to the [facility]. Please see a copy of my visit note below.    Virtual Visit Details    Type of service:  Video Visit   Video Start Time:  10:35  Video End Time:10:57 AM    Originating Location (pt. Location): Home    Distant Location (provider location):  On-site  Platform used for Video Visit: [facility] Hematology Note  Date of visit: November 25, 2024  Outpatient Clinic Note        Assessment:     Essential thrombocythemia, approavching 22 years from the time of diagnosis, with stable platelet count on current dosing of hydroxyurea, 1000 mg p.o. Saturdays and Sundays, with 500 mg p.o. daily the rest of the week.   The chance of this becoming a major issue for her is extraordinarily low, though not zero.  There is a one in 20 chance of progressing to either myelofibrosis or an acute leukemia.  This is why I have continued  to see Kelsea and its been a pleasure to do so. How meaningful for me to have been her doctor for the last over 20 years!  As has always been my concern, the major healthcare issue is related to her heavy smoking history. She has been struggling with worsening COPD.  She did develop a small (~1cm) lung cancer, but she was medically inoperable because of the COPD.  She is status post SBRT to that lesion.    Multiple other psychosocial stressors discussed at length.   She will be moving to [place] soon. I recommend following up at the [facility] for both her ET and lung cancer      Plan:       Continue current dosing of hydroxyurea  Continue current dosing of aspirin  Continue follow-up with Dr. Valle  I gave Kelsea my cell phone number today. If her new doctor needs any input, I am always happy to provide that!      Sanjay Lambert MD, MSc   of  Medicine  [employer]  [facility]  [address] [place] [address]  [phone]    __________________________________________________________________    DIAGNOSES     Essential thrombocythemia, diagnosed by bone marrow biopsy on 04/10/2001.  Kelsea has low risk features but has a significantly increased risk of thrombosis because of her long-term history of smoking.  She finally quit 6 years ago  Clinical Stage I ([id number]) lung malignancy. Kelsea was felt to be medically inoperable due to poor lung function. She is s/p curative intent SBRT, completed 9/13/2023. Most recent CT 11/2024 demonstrates postradiation/stable disease of the left upper lobe, without any local or regional progression.   COPD    History of Present Illness:     Hydroxyurea at varying doses.  She has been on this for 20 years.  Her current dose is 1000 mg p.o. daily on Saturdays and Sundays, with 500 mg daily the rest of the week  She also remains on a daily aspirin.      Interval history:     Kelsea is back for her visit  Saw Dr. Gillis of Rad Onc 11/12/2024. No evidence of progression. Follow-up in 3 months.  Moving to [place] in a few months. Worried about the care she will receive there.  Gravelly voice.  Feels more frail.  Feels good, though.  Normal aches and pains.  Does not get out very much.  The weather kind of affects her breathing.  No new lumps or bumps  No bleeding or bruising  No new swelling          Past Medical History:   I have reviewed this patient's past medical history   Past Medical History:   Diagnosis Date     COPD (chronic obstructive pulmonary disease) (H) 2014     Essential thrombocythemia (H) 07/20/2022     Heart disease 2022     Lung mass 04/03/2023     S/P radiation therapy     5,000 cGy to [facility] Lung (SBRT) completed on 9/15/2023 [facility]          Past Surgical History:    I have reviewed this patient's past surgical history       Social History:    Tobacco, ETOH, and rec drugs reviewed and as noted below with the following exceptions:  Kelsea is  to Jason who is 15 years her senior, but has no biologic children.  She is originally from [place].  She has been traveling back and forth to [place] because her 93-year-old mother, who most recently lived with her and her  Jason, wanted to see family.  Kelsea's brothers think of her as a Saint.  She wanted them to share some of the burden of their mother's care.  Her mother finally passed away at Kelsea's house about a year ago (mid ).          Family History:     Family History   Problem Relation Age of Onset     Hypertension Mother      Hyperlipidemia Mother      Heart Failure Mother          age 97     Cerebrovascular Disease Father         At age 86     Prostate Cancer Father      Hypertension Brother      Bladder Cancer Brother      Lung Cancer Brother      Lymphoma Brother             Medications:     Current Outpatient Medications   Medication Sig Dispense Refill     aspirin 81 MG EC tablet 81 mg daily       azithromycin (ZITHROMAX) 250 MG tablet Take 1 tablet (250 mg) by mouth Every Mon, Wed, Fri Morning for 360 days 36 tablet 3     calcium carbonate-vitamin D (CALTRATE) 600-10 MG-MCG per tablet Take 1 tablet by mouth 2 times daily       fluticasone (FLONASE) 50 MCG/ACT nasal spray INSTILL 2 SPRAYS INTO BOTH NOSTRILS DAILY. 48 mL 2     Fluticasone-Umeclidin-Vilanterol (TRELEGY ELLIPTA) 200-62.5-25 MCG/ACT oral inhaler Inhale 1 puff into the lungs daily 3 each 3     guaiFENesin (MUCINEX) 600 MG 12 hr tablet Take 600-1,200 mg by mouth 2 times daily       hydroxyurea (HYDREA) 500 MG capsule Take 1 capsule (500 mg) by mouth daily 5 days/week with 1000 mg the other 2 days 225 capsule 4     levalbuterol (XOPENEX HFA) 45 MCG/ACT inhaler Inhale 2 puffs into the lungs every 4 hours as needed for shortness of breath or wheezing 15 g 1     Probiotic Product (PROBIOTIC DAILY PO) Take by mouth daily        rosuvastatin (CRESTOR) 20 MG tablet Take 1 tablet (20 mg) by mouth daily 90 tablet 1     teriparatide, recombinant, (FORTEO) 600 MCG/2.4ML SOPN injection        valACYclovir (VALTREX) 500 MG tablet Take 1 tablet (500 mg) by mouth daily 90 tablet 3     Vitamin D3 (VITAMIN D, CHOLECALCIFEROL,) 25 mcg (1000 units) tablet Take by mouth daily                Physical Exam:   There were no vitals taken for this visit.    ECOG PS: 0  Constitutional: WDWN female in NAD, pleasant and appropriate  Neurologic: alert, answering questions appropriately, moving all extremities spontaneously. CN 2-12 grossly intact.  Psych: appropriate affect  No further exam could be done because this was a virtual visit  Data:      Latest Reference Range & Units 07/13/23 10:19 10/11/23 13:16 05/23/24 11:17 11/18/24 10:30   WBC 4.0 - 11.0 10e3/uL 4.1 4.3 3.9 (L) 4.7   Hemoglobin 11.7 - 15.7 g/dL 13.6 13.5 13.7 13.6   Hematocrit 35.0 - 47.0 % 41.5 39.2 41.1 40.3   Platelet Count 150 - 450 10e3/uL 332 378 472 (H) 360   RBC Count 3.80 - 5.20 10e6/uL 3.60 (L) 3.40 (L) 3.69 (L) 3.39 (L)   MCV 78 - 100 fL 115 (H) 115 (H) 111 (H) 119 (H)   MCH 26.5 - 33.0 pg 37.8 (H) 39.7 (H) 37.1 (H) 40.1 (H)   MCHC 31.5 - 36.5 g/dL 32.8 34.4 33.3 33.7   RDW 10.0 - 15.0 % 12.8 13.5 12.4 12.7   % Neutrophils % 68  68 72   (L): Data is abnormally low  (H): Data is abnormally high        No results found for this or any previous visit (from the past 24 hours).    Other Data     CT Chest:  1. Continued increase in left upper lobe spiculated solid nodule now  measuring 8 mm is considered lung rads 4B. Consider contrast CT and/or  biopsy.    Labs, imaging and treatment plan reviewed with patient. All questions answered.        30 minutes spent on the date of the encounter doing chart review, review of outside records, review of test results, interpretation of tests, patient visit and documentation       Again, thank you for allowing me to participate in the care of your  patient.        Sincerely,        Sanjay Lambert MD

## 2024-12-18 ENCOUNTER — OFFICE VISIT (OUTPATIENT)
Dept: PULMONOLOGY | Facility: CLINIC | Age: 74
End: 2024-12-18
Attending: INTERNAL MEDICINE
Payer: COMMERCIAL

## 2024-12-18 VITALS
SYSTOLIC BLOOD PRESSURE: 120 MMHG | BODY MASS INDEX: 20.76 KG/M2 | HEART RATE: 96 BPM | DIASTOLIC BLOOD PRESSURE: 69 MMHG | OXYGEN SATURATION: 95 % | WEIGHT: 115 LBS

## 2024-12-18 DIAGNOSIS — J30.2 SEASONAL ALLERGIC RHINITIS, UNSPECIFIED TRIGGER: ICD-10-CM

## 2024-12-18 DIAGNOSIS — J44.9 CHRONIC OBSTRUCTIVE PULMONARY DISEASE, UNSPECIFIED COPD TYPE (H): ICD-10-CM

## 2024-12-18 DIAGNOSIS — J43.2 CENTRILOBULAR EMPHYSEMA (H): ICD-10-CM

## 2024-12-18 PROCEDURE — G2211 COMPLEX E/M VISIT ADD ON: HCPCS | Performed by: INTERNAL MEDICINE

## 2024-12-18 PROCEDURE — 99215 OFFICE O/P EST HI 40 MIN: CPT | Performed by: INTERNAL MEDICINE

## 2024-12-18 RX ORDER — LEVALBUTEROL TARTRATE 45 UG/1
2 AEROSOL, METERED ORAL EVERY 4 HOURS PRN
Qty: 45 G | Refills: 3 | Status: SHIPPED | OUTPATIENT
Start: 2024-12-18

## 2024-12-18 RX ORDER — FLUTICASONE PROPIONATE 50 MCG
2 SPRAY, SUSPENSION (ML) NASAL DAILY
Qty: 16 G | Refills: 11 | Status: SHIPPED | OUTPATIENT
Start: 2024-12-18

## 2024-12-18 NOTE — PROGRESS NOTES
Pulmonary Clinic Return Patient Visit  Reason for Visit: COPD  History of Present Illness  Sienna Bennett is a 74-year-old female who presents to clinic for follow up of COPD. I last saw her in 12/2023.  To briefly review, Kelsea was diagnosed with COPD over the last several years based on symptoms alone.  Her symptoms include daily coughing which is productive of tenacious, occasionally copious yellowish sputum, usually worse in the morning with associated dyspnea on exertion.  She also has underlying seasonal allergies with rhinitis and postnasal drip. She continued to be very symptomatic while on previously prescribed Incruse and I escalated her regimen to Trelegy with very good results.  She has done fairly well on her escalated regimen of Trelegy and there has had only one AECOPDs since the last clinic visit in 5/2023 for which she was treated with zpak and steroids. She appears to have better airway clearance since adding guaifenesin/Mucinex for better airway clearance.   Today, she is less SOB and has exertional dyspnea. Rescue inhalers have been helpful and she uses it seldomly. She was found to have a VARSHA spiculated nodule which was discussed at the cancer conference for which she was referred to Rad Onc for radiotherapy. She has since received 5 treatments and continues care with Dr. Chatterjee. Her surveillance scans have been reassuring with stability.   She denies any chest pains, no orthopnea, no PND and no pedal swellings.  No dysphonia and no dysphagia.  She denies any loud snoring nor daytime sleepiness. She is planning on going on a cruise next month.  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 Brother was diagnosed with lung cancer and he was a smoker. No exposure to asbestos, dust or toxic fumes.    Review of Systems:  10 of 14 systems reviewed and are negative unless otherwise stated in HPI.    Past Medical History:   Diagnosis  Date    COPD (chronic obstructive pulmonary disease) (H)     Essential thrombocythemia (H) 2022    Heart disease     Lung mass 2023    S/P radiation therapy     5,000 cGy to VARSHA Lung (SBRT) completed on 9/15/2023 [facility]       Past Surgical History:   Procedure Laterality Date    BIOPSY Left ?    breast bx from calcifications, benign    IR LUMBAR KYPHOPLASTY VERTEBRAE  10/11/2023    ORTHOPEDIC SURGERY  1967    Tendon repair Hand    THYROID SURGERY      left part of thyroid gland- age 22       Family History   Problem Relation Age of Onset    Hypertension Mother     Hyperlipidemia Mother     Heart Failure Mother          age 97    Cerebrovascular Disease Father         At age 86    Prostate Cancer Father     Hypertension Brother     Bladder Cancer Brother     Lung Cancer Brother     Lymphoma Brother        Social History     Socioeconomic History    Marital status:    Tobacco Use    Smoking status: Former Smoker     Packs/day: 1.00     Years: 46.00     Pack years: 46.00     Types: Cigarettes     Start date: 1968     Quit date: 2014     Years since quittin.5    Smokeless tobacco: Never Used    Tobacco comment: Quit in    Vaping Use    Vaping Use: Never used   Substance and Sexual Activity    Alcohol use: Yes     Comment: rare- maybe once a month    Drug use: No    Sexual activity: Not Currently     Birth control/protection: Post-menopausal   Other Topics Concern    Parent/sibling w/ CABG, MI or angioplasty before 65F 55M? No         Allergies   Allergen Reactions    Environmental [Adhesive Tape]     Wellbutrin [Bupropion]          Current Outpatient Medications:     aspirin 81 MG EC tablet, 81 mg daily, Disp: , Rfl:     azithromycin (ZITHROMAX) 250 MG tablet, Take 1 tablet (250 mg) by mouth Every Mon, Wed, Fri Morning for 360 days, Disp: 36 tablet, Rfl: 3    calcium carbonate-vitamin D (CALTRATE) 600-10 MG-MCG per tablet, Take 1 tablet by mouth 2  times daily, Disp: , Rfl:     fluticasone (FLONASE) 50 MCG/ACT nasal spray, INSTILL 2 SPRAYS INTO BOTH NOSTRILS DAILY., Disp: 48 mL, Rfl: 2    Fluticasone-Umeclidin-Vilanterol (TRELEGY ELLIPTA) 200-62.5-25 MCG/ACT oral inhaler, Inhale 1 puff into the lungs daily, Disp: 3 each, Rfl: 3    guaiFENesin (MUCINEX) 600 MG 12 hr tablet, Take 600-1,200 mg by mouth 2 times daily, Disp: , Rfl:     hydroxyurea (HYDREA) 500 MG capsule, Take 1 capsule (500 mg) by mouth daily 5 days/week with 1000 mg the other 2 days, Disp: 225 capsule, Rfl: 4    levalbuterol (XOPENEX HFA) 45 MCG/ACT inhaler, Inhale 2 puffs into the lungs every 4 hours as needed for shortness of breath or wheezing, Disp: 15 g, Rfl: 1    Probiotic Product (PROBIOTIC DAILY PO), Take by mouth daily, Disp: , Rfl:     rosuvastatin (CRESTOR) 20 MG tablet, Take 1 tablet (20 mg) by mouth daily, Disp: 90 tablet, Rfl: 1    teriparatide, recombinant, (FORTEO) 600 MCG/2.4ML SOPN injection, , Disp: , Rfl:     valACYclovir (VALTREX) 500 MG tablet, Take 1 tablet (500 mg) by mouth daily, Disp: 90 tablet, Rfl: 3    Vitamin D3 (VITAMIN D, CHOLECALCIFEROL,) 25 mcg (1000 units) tablet, Take by mouth daily, Disp: , Rfl:       Physical Exam:  There were no vitals taken for this visit.  GENERAL: Well developed, well nourished, alert, and in no apparent distress.  HEENT: Normocephalic, atraumatic. PERRL, EOMI. Oral mucosa is moist. No perioral cyanosis.  NECK: supple, no masses, no thyromegaly.  RESP:  Normal respiratory effort.  Markedly reduced breath sounds.  No rales, wheezes, rhonchi.  No cyanosis or clubbing.  CV: Normal S1, S2, regular rhythm, normal rate. No murmur.  No LE edema.   ABDOMEN:  Soft, non-tender, non-distended.   SKIN: warm and dry. No rash.  NEURO: AAOx3.  Normal gait.  Fluent speech.  PSYCH: mentation appears normal.   Results:  PFTs: Reviewed and discussed with patient-moderate obstruction with diffusion impairment.  Air trapping and hyperinflation  Most Recent  "[facility] Pulmonary Function Testing    FVC-Pred   Date Value Ref Range Status   02/15/2023 2.68 L      FVC-Pre   Date Value Ref Range Status   02/15/2023 2.31 L      FVC-%Pred-Pre   Date Value Ref Range Status   02/15/2023 86 %      FEV1-Pre   Date Value Ref Range Status   02/15/2023 0.92 L      FEV1-%Pred-Pre   Date Value Ref Range Status   02/15/2023 44 %      FEV1FVC-Pred   Date Value Ref Range Status   02/15/2023 78 %      FEV1FVC-Pre   Date Value Ref Range Status   02/15/2023 40 %      No results found for: \"20029\"  FEFMax-Pred   Date Value Ref Range Status   02/15/2023 5.38 L/sec      FEFMax-Pre   Date Value Ref Range Status   02/15/2023 3.32 L/sec      FEFMax-%Pred-Pre   Date Value Ref Range Status   02/15/2023 61 %      ExpTime-Pre   Date Value Ref Range Status   02/15/2023 10.15 sec      FIFMax-Pre   Date Value Ref Range Status   02/15/2023 2.97 L/sec      FEV1FEV6-Pred   Date Value Ref Range Status   02/15/2023 79 %      FEV1FEV6-Pre   Date Value Ref Range Status   02/15/2023 46 %      No results found for: \"20055\"  Imaging (personally reviewed in clinic today): CT Chest Lung 12/12/2023  Impression:   Left apical nodule measuring 8.3 mm, slightly increased since 7/5/2023 and 8/9/2023. Continued close follow-up 3 months.  Lung-RADS 4A.       Assessment and Plan:   COPD (Group D)   Significant obstruction on PFTs with emphysematous changes on radiologic imaging.  She does have a CAT score of 7 significantly improved from before. She has done fairly well on her escalated regimen of Trelegy and she appears to have better airway clearance since adding guaifenesin/Mucinex for better airway clearance and prescriptions were given with refills.  She is fairly active and I will hold off on referring her to pulmonary rehab at this time. She is moving to [place] and we will help with the transition of care and also with prescriptions until she gets established over there.   Left upper lobe nodule/presumed lung cancer " s/p radiotherapy  Reviewed surveillance scans and continues to follow closely with RadOnc- Dr. Chatterjee.      Questions and concerns were answered to the patient's satisfaction.  she was provided with my contact information should new questions or concerns arise in the interim.  Up to date on vaccination    I spent 40 minutes on the date of the encounter doing chart review, history and exam, documentation and further coordination as noted above exclusive of time interpreting PFT, Chest Xray, CT Chest.     Gilda Walker MD  Pulmonary, Critical Care and Sleep Medicine  [facility]  Pager: [phone]        The above note was dictated using voice recognition software and may include typographical errors. Please contact the author for any clarifications.

## 2024-12-18 NOTE — NURSING NOTE
Sienna Bennett's goals for this visit include:   Chief Complaint   Patient presents with    Follow Up    COPD       She requests these members of her care team be copied on today's visit information: no     PCP: Kalli Valle    Referring Provider:  Gilda Walker MD  [address] [place] [id number]    /69 (BP Location: Left arm, Patient Position: Chair, Cuff Size: Adult Regular)   Pulse 96   Wt 52.2 kg (115 lb)   SpO2 95%   BMI 20.76 kg/m      Do you need any medication refills at today's visit? Yes     DORINDA Lucas   Neph/Pulm [facility]

## 2024-12-30 ENCOUNTER — MYC MEDICAL ADVICE (OUTPATIENT)
Dept: CARDIOLOGY | Facility: CLINIC | Age: 74
End: 2024-12-30
Payer: COMMERCIAL

## 2024-12-30 DIAGNOSIS — E78.5 HYPERLIPIDEMIA, ACQUIRED: ICD-10-CM

## 2024-12-30 DIAGNOSIS — I25.10 CORONARY ARTERY DISEASE INVOLVING NATIVE CORONARY ARTERY OF NATIVE HEART WITHOUT ANGINA PECTORIS: ICD-10-CM

## 2024-12-30 RX ORDER — ROSUVASTATIN CALCIUM 20 MG/1
20 TABLET, COATED ORAL DAILY
Qty: 90 TABLET | Refills: 3 | Status: SHIPPED | OUTPATIENT
Start: 2024-12-30

## 2025-01-22 DIAGNOSIS — J30.2 SEASONAL ALLERGIC RHINITIS, UNSPECIFIED TRIGGER: ICD-10-CM

## 2025-01-22 RX ORDER — FLUTICASONE PROPIONATE 50 MCG
2 SPRAY, SUSPENSION (ML) NASAL DAILY
Qty: 48 ML | Refills: 4 | Status: SHIPPED | OUTPATIENT
Start: 2025-01-22

## 2025-02-05 ENCOUNTER — PATIENT OUTREACH (OUTPATIENT)
Dept: CARE COORDINATION | Facility: CLINIC | Age: 75
End: 2025-02-05
Payer: COMMERCIAL

## 2025-02-19 ENCOUNTER — ANCILLARY PROCEDURE (OUTPATIENT)
Dept: CT IMAGING | Facility: CLINIC | Age: 75
End: 2025-02-19
Attending: SURGERY
Payer: COMMERCIAL

## 2025-02-19 ENCOUNTER — ANCILLARY PROCEDURE (OUTPATIENT)
Dept: BONE DENSITY | Facility: CLINIC | Age: 75
End: 2025-02-19
Attending: INTERNAL MEDICINE
Payer: COMMERCIAL

## 2025-02-19 DIAGNOSIS — M81.0 OSTEOPOROSIS: ICD-10-CM

## 2025-02-19 DIAGNOSIS — C34.12 PRIMARY MALIGNANT NEOPLASM OF LEFT UPPER LOBE OF LUNG (H): ICD-10-CM

## 2025-02-19 PROCEDURE — 77080 DXA BONE DENSITY AXIAL: CPT | Performed by: RADIOLOGY

## 2025-02-19 PROCEDURE — 71250 CT THORAX DX C-: CPT | Performed by: RADIOLOGY

## 2025-02-26 ENCOUNTER — OFFICE VISIT (OUTPATIENT)
Dept: RADIATION ONCOLOGY | Facility: CLINIC | Age: 75
End: 2025-02-26
Payer: COMMERCIAL

## 2025-02-26 VITALS
TEMPERATURE: 97.8 F | DIASTOLIC BLOOD PRESSURE: 74 MMHG | OXYGEN SATURATION: 96 % | HEART RATE: 94 BPM | SYSTOLIC BLOOD PRESSURE: 135 MMHG | RESPIRATION RATE: 18 BRPM

## 2025-02-26 DIAGNOSIS — C34.12 PRIMARY MALIGNANT NEOPLASM OF LEFT UPPER LOBE OF LUNG (H): Primary | ICD-10-CM

## 2025-02-26 ASSESSMENT — PAIN SCALES - GENERAL: PAINLEVEL_OUTOF10: NO PAIN (0)

## 2025-02-26 NOTE — LETTER
2025      Sienna Bennett  [address]  [place] [address]      Dear Colleague,    Thank you for referring your patient, Sienna Bennett, to the [facility] RADIATION ONCOLOGY [place]. Please see a copy of my visit note below.         Department of Radiation Oncology  [facility]: Cancer Center  [facility]  [address] [address] [place] [address] [phone]       Radiation Oncology Follow-up Visit  2025      Sienna Bennett  MRN: [id number]   : 1950        DIAGNOSIS / ID: Ms. Bennett is a 74 year old female with poor lung function presenting with left upper lobe nodule, most consistent with a [id number] malignancy. Plan for SBRT to VARSHA.       INTENT OF RADIOTHERAPY: Definitive     CONCURRENT SYSTEMIC THERAPY: No               SITE OF TREATMENT: VARSHA     DATES  OF TREATMENT: 23- 9/15/23     TOTAL DOSE OF TREATMENT / FRACTIONS: 50Gy/5fx    INTERVAL SINCE COMPLETION OF RADIATION THERAPY: 17 months    INTERVAL HISTORY:   Overall, patient is feeling quite well.  She denies any active chest pain, dyspnea, hemoptysis, focal neurologic changes. She is moving to [place] this weekend. She still is managed by Dr. Walker for her COPD and will see Dr. Lambert for essential thrombocytopenia.    ROS: negative    Past Medical History:   Diagnosis Date     COPD (chronic obstructive pulmonary disease) (H)      Essential thrombocythemia (H) 2022     Heart disease      Lung mass 2023     S/P radiation therapy     5,000 cGy to VARSHA Lung (SBRT) completed on 9/15/2023 - [facility]       Past Surgical History:   Procedure Laterality Date     BIOPSY Left ?    breast bx from calcifications, benign     IR LUMBAR KYPHOPLASTY VERTEBRAE  10/11/2023     ORTHOPEDIC SURGERY  1967    Tendon repair Hand     THYROID SURGERY      left part of thyroid gland- age 22       Family History   Problem Relation  Age of Onset     Hypertension Mother      Hyperlipidemia Mother      Heart Failure Mother          age 97     Cerebrovascular Disease Father         At age 86     Prostate Cancer Father      Hypertension Brother      Bladder Cancer Brother      Lung Cancer Brother      Lymphoma Brother        Social History     Tobacco Use     Smoking status: Former     Current packs/day: 0.00     Average packs/day: 1.5 packs/day for 45.0 years (67.5 ttl pk-yrs)     Types: Cigarettes     Start date: 1968     Quit date: 2013     Years since quittin.1     Smokeless tobacco: Never   Substance Use Topics     Alcohol use: Yes     Comment: rare social use - 0-2 x's month     Current Outpatient Medications   Medication Sig Dispense Refill     aspirin 81 MG EC tablet 81 mg daily       azithromycin (ZITHROMAX) 250 MG tablet Take 1 tablet (250 mg) by mouth Every Mon, Wed, Fri Morning for 360 days 36 tablet 3     calcium carbonate-vitamin D (CALTRATE) 600-10 MG-MCG per tablet Take 1 tablet by mouth 2 times daily. Calcium 1,300 mg daily  Vitamin D 1,000 international unit(s) daily       fluticasone (FLONASE) 50 MCG/ACT nasal spray INSTILL 2 SPRAYS INTO BOTH NOSTRILS DAILY 48 mL 4     Fluticasone-Umeclidin-Vilanterol (TRELEGY ELLIPTA) 200-62.5-25 MCG/ACT oral inhaler Inhale 1 puff into the lungs daily. 3 each 3     guaiFENesin (MUCINEX) 600 MG 12 hr tablet Take 600-1,200 mg by mouth 2 times daily       hydroxyurea (HYDREA) 500 MG capsule Take 1 capsule (500 mg) by mouth daily 5 days/week with 1000 mg the other 2 days 225 capsule 4     levalbuterol (XOPENEX HFA) 45 MCG/ACT inhaler Inhale 2 puffs into the lungs every 4 hours as needed for shortness of breath or wheezing. 45 g 3     rosuvastatin (CRESTOR) 20 MG tablet Take 1 tablet (20 mg) by mouth daily. 90 tablet 3     teriparatide, recombinant, (FORTEO) 600 MCG/2.4ML SOPN injection        Vitamin D3 (VITAMIN D, CHOLECALCIFEROL,) 25 mcg (1000 units) tablet Take by mouth daily        Probiotic Product (PROBIOTIC DAILY PO) Take by mouth daily       valACYclovir (VALTREX) 500 MG tablet Take 1 tablet (500 mg) by mouth daily (Patient not taking: Reported on 2/26/2025) 90 tablet 3     No current facility-administered medications for this visit.        Allergies   Allergen Reactions     Environmental [Adhesive Tape]      Wellbutrin [Bupropion]      PHYSICAL EXAM:  /74 (BP Location: Left arm, Patient Position: Chair, Cuff Size: Adult Regular)   Pulse 94   Temp 97.8  F (36.6  C) (Oral)   Resp 18   SpO2 96%     Gen: Alert, in NAD  Eyes: PERRL, EOMI, sclera anicteric  HENT NCAT  Neck: Supple, full ROM, no LAD  Pulm: No wheezing, stridor or respiratory distress, CTAB  CV: Well-perfused, no cyanosis, RRR  Musculoskeletal: Normal bulk and tone   Skin: Normal color and turgor  Neurologic: A/Ox3, No gross neurologic deficits  Psychiatric: Appropriate mood and affect    LABS AND IMAGING:  CT scan was performed on 2/9/25, which demonstrated stable postradiation changes/scarring left upper lobe/apex which is consistet with prior radiation treatment site.    IMPRESSION: Overall, patient is doing well from a radiation acute toxicity perspective.  CT demonstrates postradiation/stable disease of the left upper lobe, without any local or regional progression.     PLAN:   Follow up in 6 months with noncontrast chest CT here if she does not establilsh care with oncologist/pulmonary in [place].    Allie Myers M.D.  Department of Radiation Oncology  [facility]       Again, thank you for allowing me to participate in the care of your patient.        Sincerely,        PALLAVI Myers MD    Electronically signed

## 2025-02-26 NOTE — PROGRESS NOTES
Department of Radiation Oncology  [facility]    Health: Cancer Center  [facility]  [address] [address] [place] [address] [phone]       Radiation Oncology Follow-up Visit  2025      Sienna Bennett  MRN: [id number]   : 1950        DIAGNOSIS / ID: Ms. Bennett is a 74 year old female with poor lung function presenting with left upper lobe nodule, most consistent with a [id number] malignancy. Plan for SBRT to VARSHA.       INTENT OF RADIOTHERAPY: Definitive     CONCURRENT SYSTEMIC THERAPY: No               SITE OF TREATMENT: VARSHA     DATES  OF TREATMENT: 23- 9/15/23     TOTAL DOSE OF TREATMENT / FRACTIONS: 50Gy/5fx    INTERVAL SINCE COMPLETION OF RADIATION THERAPY: 17 months    INTERVAL HISTORY:   Overall, patient is feeling quite well.  She denies any active chest pain, dyspnea, hemoptysis, focal neurologic changes. She is moving to [place] this weekend. She still is managed by Dr. Walker for her COPD and will see Dr. Lambert for essential thrombocytopenia.    ROS: negative    Past Medical History:   Diagnosis Date    COPD (chronic obstructive pulmonary disease) (H) 2014    Essential thrombocythemia (H) 2022    Heart disease     Lung mass 2023    S/P radiation therapy     5,000 cGy to VARSHA Lung (SBRT) completed on 9/15/2023 - [facility]       Past Surgical History:   Procedure Laterality Date    BIOPSY Left ?    breast bx from calcifications, benign    IR LUMBAR KYPHOPLASTY VERTEBRAE  10/11/2023    ORTHOPEDIC SURGERY  1967    Tendon repair Hand    THYROID SURGERY      left part of thyroid gland- age 22       Family History   Problem Relation Age of Onset    Hypertension Mother     Hyperlipidemia Mother     Heart Failure Mother          age 97    Cerebrovascular Disease Father         At age 86    Prostate Cancer Father     Hypertension Brother     Bladder Cancer Brother     Lung Cancer Brother      Lymphoma Brother        Social History     Tobacco Use    Smoking status: Former     Current packs/day: 0.00     Average packs/day: 1.5 packs/day for 45.0 years (67.5 ttl pk-yrs)     Types: Cigarettes     Start date: 1968     Quit date: 2013     Years since quittin.1    Smokeless tobacco: Never   Substance Use Topics    Alcohol use: Yes     Comment: rare social use - 0-2 x's month     Current Outpatient Medications   Medication Sig Dispense Refill    aspirin 81 MG EC tablet 81 mg daily      azithromycin (ZITHROMAX) 250 MG tablet Take 1 tablet (250 mg) by mouth Every Mon, Wed, Fri Morning for 360 days 36 tablet 3    calcium carbonate-vitamin D (CALTRATE) 600-10 MG-MCG per tablet Take 1 tablet by mouth 2 times daily. Calcium 1,300 mg daily  Vitamin D 1,000 international unit(s) daily      fluticasone (FLONASE) 50 MCG/ACT nasal spray INSTILL 2 SPRAYS INTO BOTH NOSTRILS DAILY 48 mL 4    Fluticasone-Umeclidin-Vilanterol (TRELEGY ELLIPTA) 200-62.5-25 MCG/ACT oral inhaler Inhale 1 puff into the lungs daily. 3 each 3    guaiFENesin (MUCINEX) 600 MG 12 hr tablet Take 600-1,200 mg by mouth 2 times daily      hydroxyurea (HYDREA) 500 MG capsule Take 1 capsule (500 mg) by mouth daily 5 days/week with 1000 mg the other 2 days 225 capsule 4    levalbuterol (XOPENEX HFA) 45 MCG/ACT inhaler Inhale 2 puffs into the lungs every 4 hours as needed for shortness of breath or wheezing. 45 g 3    rosuvastatin (CRESTOR) 20 MG tablet Take 1 tablet (20 mg) by mouth daily. 90 tablet 3    teriparatide, recombinant, (FORTEO) 600 MCG/2.4ML SOPN injection       Vitamin D3 (VITAMIN D, CHOLECALCIFEROL,) 25 mcg (1000 units) tablet Take by mouth daily      Probiotic Product (PROBIOTIC DAILY PO) Take by mouth daily      valACYclovir (VALTREX) 500 MG tablet Take 1 tablet (500 mg) by mouth daily (Patient not taking: Reported on 2025) 90 tablet 3     No current facility-administered medications for this visit.        Allergies    Allergen Reactions    Environmental [Adhesive Tape]     Wellbutrin [Bupropion]      PHYSICAL EXAM:  /74 (BP Location: Left arm, Patient Position: Chair, Cuff Size: Adult Regular)   Pulse 94   Temp 97.8  F (36.6  C) (Oral)   Resp 18   SpO2 96%     Gen: Alert, in NAD  Eyes: PERRL, EOMI, sclera anicteric  HENT NCAT  Neck: Supple, full ROM, no LAD  Pulm: No wheezing, stridor or respiratory distress, CTAB  CV: Well-perfused, no cyanosis, RRR  Musculoskeletal: Normal bulk and tone   Skin: Normal color and turgor  Neurologic: A/Ox3, No gross neurologic deficits  Psychiatric: Appropriate mood and affect    LABS AND IMAGING:  CT scan was performed on 2/9/25, which demonstrated stable postradiation changes/scarring left upper lobe/apex which is consistet with prior radiation treatment site.    IMPRESSION: Overall, patient is doing well from a radiation acute toxicity perspective.  CT demonstrates postradiation/stable disease of the left upper lobe, without any local or regional progression.     PLAN:   Follow up in 6 months with noncontrast chest CT here if she does not establilsh care with oncologist/pulmonary in [place].    Allie Myers M.D.  Department of Radiation Oncology  [facility]

## 2025-02-26 NOTE — PATIENT INSTRUCTIONS
Please contact [facility]iation Oncology RN with questions or concerns following today's appointment.    If you are a patient of Dr. Gillis call: [phone]   If you are a patient of Dr. Myers call: [phone]    Please feel free to leave a detailed message if your call is not answered.    If your call is not received before 3:00 PM, it may not be returned until the following business day.    If you are receiving radiation treatment and need assistance after 3:00 PM or on the weekends, please call [phone] and ask to speak to the radiation oncologist on-call.    Thank you!    [facility] Radiation Oncology Nursing

## 2025-02-26 NOTE — NURSING NOTE
"Oncology Rooming Note    February 26, 2025 10:09 AM   Sienna Bennett is a 74 year old female who presents for:    Chief Complaint   Patient presents with    Cancer     Radiation oncology return visit with Dr. Myers     Initial Vitals: /74 (BP Location: Left arm, Patient Position: Chair, Cuff Size: Adult Regular)   Pulse 94   Temp 97.8  F (36.6  C) (Oral)   Resp 18   SpO2 96%  Estimated body mass index is 20.76 kg/m  as calculated from the following:    Height as of 7/25/24: 1.585 m (5' 2.4\").    Weight as of 12/18/24: 52.2 kg (115 lb). There is no height or weight on file to calculate BSA.  No Pain (0) Comment: Data Unavailable   No LMP recorded. Patient is postmenopausal.  Allergies reviewed: Yes  Medications reviewed: Yes    Medications: Medication refills not needed today.  Pharmacy name entered into [facility]: [facility] [id number] [place] [address]    Frailty Screening:   Is the patient here for a new oncology consult visit in cancer care? 2. No    PHQ9:  Did this patient require a PHQ9?: No      Clinical concerns: patient presents to clinic for return visit, completed 5,000 cGy to VARSHA lung on 9/15/2023.  Patient reports she is feeling well, reports shortness of breath with moderate exertion, relief with rest, reports intermittent productive cough at baseline, denies hemoptysis, denies use of supplemental oxygen.  Patient completed CT Chest on 2/19/2025 and is here for review with Dr. Myers.  Patient reports she and her  are relocating to [place] on Saturday, 3/1/2025 and reports she will be establishing medical care once she is settled into the local area.  Patient prefers to continue to schedule follow-up with [facility] Radiation Oncology until she has fully establish care in [place].   Dr. Myers was notified.      Li Crowe RN BSN OCN CBCN                "

## 2025-05-19 DIAGNOSIS — D47.3 ESSENTIAL THROMBOCYTHEMIA (H): ICD-10-CM

## 2025-05-19 RX ORDER — HYDROXYUREA 500 MG/1
500 CAPSULE ORAL DAILY
Qty: 225 CAPSULE | Refills: 4 | Status: SHIPPED | OUTPATIENT
Start: 2025-05-19

## 2025-06-25 ENCOUNTER — PATIENT OUTREACH (OUTPATIENT)
Dept: CARE COORDINATION | Facility: CLINIC | Age: 75
End: 2025-06-25
Payer: COMMERCIAL

## 2025-07-28 NOTE — TELEPHONE ENCOUNTER
Pt has been booked for a NEW consult with Endocrinologist for June 13, 2024.          
Reached out and spoke with pt to notify her appt 11/22 with Dr Small is canceled, and the referring office will reach out to pt. Once a referral is submitted to  Endo.  
SITUATION:  Pt referred on 9/26/23 by Dr. Solitario Ortiz ([facility] [place])    Unfortunately, Dr Small is no longer available in our clinic and we are cancelling this patient's appointment for 11/22/23.       BACKGROUND:  Reason for bone health consult:   History of Osteoporosis, back pain and L4 compression fracture. Need to discuss bone health. The pt is leaving the state in a few weeks (January?) and we would like her seen prior to that.  S/p vertebroplasty  .  DEXA booked for 11/13/23     ASSESSMENT / ACTION:  We do not have an alternate provider to reassign this patient to.    Future Bone Health / Osteoporosis referrals can be made to Endocrinology or Sports Medicine Departments    REQUEST / RECOMMENDATION:  Notified Sienna Bennett  via this [employer] encounter and will contact by phone as well to be certain s/he knows the appointment is being cancelled and to to let them know they will need to contact their referring provider to get a new referral.    Routing as well to Dr Ortiz's team so they can initiate a new referral for pt. to be seen elsewhere for evaluation of osteoporosis.    See mychart note to Kelsea, PMR staff to cancel appt after speaking with Kelsea.     Tatyana Nguyen RN on 11/1/2023 at 5:53 PM              
We will take care of placing a new referral to Endocrine with the request to get her in prior to 1/2/24 which is when she leaves the state for the remainder of the winter.    Anali Elias RN Care Coordinator   Neurology/Neurosurgery/PM&R/ Pain Management     
[Negative] : Heme/Lymph
[Negative] : Heme/Lymph

## 2025-08-06 ENCOUNTER — PATIENT OUTREACH (OUTPATIENT)
Dept: CARE COORDINATION | Facility: CLINIC | Age: 75
End: 2025-08-06
Payer: COMMERCIAL

## 2025-08-07 DIAGNOSIS — Z12.11 COLON CANCER SCREENING: ICD-10-CM

## 2025-08-21 ENCOUNTER — ORDERS ONLY (AUTO-RELEASED) (OUTPATIENT)
Dept: URGENT CARE | Facility: CLINIC | Age: 75
End: 2025-08-21
Payer: COMMERCIAL

## 2025-08-21 DIAGNOSIS — Z12.11 COLON CANCER SCREENING: ICD-10-CM

## (undated) RX ORDER — CEFAZOLIN SODIUM 2 G/100ML
INJECTION, SOLUTION INTRAVENOUS
Status: DISPENSED
Start: 2023-10-11

## (undated) RX ORDER — SODIUM CHLORIDE 9 MG/ML
INJECTION, SOLUTION INTRAVENOUS
Status: DISPENSED
Start: 2023-10-11

## (undated) RX ORDER — BUPIVACAINE HYDROCHLORIDE 5 MG/ML
INJECTION, SOLUTION EPIDURAL; INTRACAUDAL
Status: DISPENSED
Start: 2023-10-11

## (undated) RX ORDER — LIDOCAINE HYDROCHLORIDE 10 MG/ML
INJECTION, SOLUTION EPIDURAL; INFILTRATION; INTRACAUDAL; PERINEURAL
Status: DISPENSED
Start: 2023-10-11

## (undated) RX ORDER — FENTANYL CITRATE 50 UG/ML
INJECTION, SOLUTION INTRAMUSCULAR; INTRAVENOUS
Status: DISPENSED
Start: 2023-10-11